# Patient Record
Sex: FEMALE | Race: WHITE | NOT HISPANIC OR LATINO | Employment: FULL TIME | URBAN - NONMETROPOLITAN AREA
[De-identification: names, ages, dates, MRNs, and addresses within clinical notes are randomized per-mention and may not be internally consistent; named-entity substitution may affect disease eponyms.]

---

## 2017-01-09 ENCOUNTER — OFFICE VISIT - GICH (OUTPATIENT)
Dept: FAMILY MEDICINE | Facility: OTHER | Age: 47
End: 2017-01-09

## 2017-01-09 ENCOUNTER — HISTORY (OUTPATIENT)
Dept: FAMILY MEDICINE | Facility: OTHER | Age: 47
End: 2017-01-09

## 2017-01-09 DIAGNOSIS — R05.9 COUGH: ICD-10-CM

## 2017-01-09 DIAGNOSIS — H65.192 OTHER ACUTE NONSUPPURATIVE OTITIS MEDIA, LEFT EAR: ICD-10-CM

## 2017-01-09 DIAGNOSIS — R06.02 SHORTNESS OF BREATH: ICD-10-CM

## 2017-01-09 DIAGNOSIS — J06.9 ACUTE UPPER RESPIRATORY INFECTION: ICD-10-CM

## 2017-01-09 DIAGNOSIS — J20.9 ACUTE BRONCHITIS: ICD-10-CM

## 2017-03-20 ENCOUNTER — OFFICE VISIT - GICH (OUTPATIENT)
Dept: FAMILY MEDICINE | Facility: OTHER | Age: 47
End: 2017-03-20

## 2017-03-20 ENCOUNTER — HISTORY (OUTPATIENT)
Dept: FAMILY MEDICINE | Facility: OTHER | Age: 47
End: 2017-03-20

## 2017-03-20 ENCOUNTER — HISTORY (OUTPATIENT)
Dept: EMERGENCY MEDICINE | Facility: OTHER | Age: 47
End: 2017-03-20

## 2017-03-21 ENCOUNTER — AMBULATORY - GICH (OUTPATIENT)
Dept: FAMILY MEDICINE | Facility: OTHER | Age: 47
End: 2017-03-21

## 2017-05-26 ENCOUNTER — HISTORY (OUTPATIENT)
Dept: FAMILY MEDICINE | Facility: OTHER | Age: 47
End: 2017-05-26

## 2017-05-26 ENCOUNTER — OFFICE VISIT - GICH (OUTPATIENT)
Dept: FAMILY MEDICINE | Facility: OTHER | Age: 47
End: 2017-05-26

## 2017-05-26 DIAGNOSIS — M54.50 LOW BACK PAIN: ICD-10-CM

## 2017-06-22 ENCOUNTER — AMBULATORY - GICH (OUTPATIENT)
Dept: FAMILY MEDICINE | Facility: OTHER | Age: 47
End: 2017-06-22

## 2017-06-22 ENCOUNTER — HISTORY (OUTPATIENT)
Dept: FAMILY MEDICINE | Facility: OTHER | Age: 47
End: 2017-06-22

## 2017-06-22 ENCOUNTER — OFFICE VISIT - GICH (OUTPATIENT)
Dept: FAMILY MEDICINE | Facility: OTHER | Age: 47
End: 2017-06-22

## 2017-06-22 DIAGNOSIS — R53.83 OTHER FATIGUE: ICD-10-CM

## 2017-06-22 DIAGNOSIS — M79.10 MYALGIA: ICD-10-CM

## 2017-06-22 DIAGNOSIS — E55.9 VITAMIN D DEFICIENCY: ICD-10-CM

## 2017-06-22 LAB
ABSOLUTE BASOPHILS - HISTORICAL: 0.1 THOU/CU MM
ABSOLUTE EOSINOPHILS - HISTORICAL: 0.1 THOU/CU MM
ABSOLUTE IMMATURE GRANULOCYTES(METAS,MYELOS,PROS) - HISTORICAL: 0 THOU/CU MM
ABSOLUTE LYMPHOCYTES - HISTORICAL: 2.3 THOU/CU MM (ref 0.9–2.9)
ABSOLUTE MONOCYTES - HISTORICAL: 0.5 THOU/CU MM
ABSOLUTE NEUTROPHILS - HISTORICAL: 4.3 THOU/CU MM (ref 1.7–7)
BASOPHILS # BLD AUTO: 0.7 %
EOSINOPHIL NFR BLD AUTO: 1.7 %
ERYTHROCYTE [DISTWIDTH] IN BLOOD BY AUTOMATED COUNT: 13.7 % (ref 11.5–15.5)
HCT VFR BLD AUTO: 43.4 % (ref 33–51)
HEMOGLOBIN: 13.8 G/DL (ref 12–16)
IMMATURE GRANULOCYTES(METAS,MYELOS,PROS) - HISTORICAL: 0.3 %
LYMPHOCYTES NFR BLD AUTO: 32 % (ref 20–44)
MCH RBC QN AUTO: 26.7 PG (ref 26–34)
MCHC RBC AUTO-ENTMCNC: 31.8 G/DL (ref 32–36)
MCV RBC AUTO: 84 FL (ref 80–100)
MONOCYTES NFR BLD AUTO: 6.4 %
NEUTROPHILS NFR BLD AUTO: 58.9 % (ref 42–72)
PLATELET # BLD AUTO: 227 THOU/CU MM (ref 140–440)
PMV BLD: 8.4 FL (ref 6.5–11)
RED BLOOD COUNT - HISTORICAL: 5.16 MIL/CU MM (ref 4–5.2)
VITAMIN D TOTAL - HISTORICAL: 25.4 NG/ML
WHITE BLOOD COUNT - HISTORICAL: 7.2 THOU/CU MM (ref 4.5–11)

## 2017-06-23 LAB — LYME SCREEN W/REFLEX WEST BLOT - HISTORICAL: NEGATIVE

## 2017-06-25 LAB
ANAPLASMA PHAGOCYTOPHILUM - HISTORICAL: NEGATIVE
EHRLICHIA CHAFFEENSIS - HISTORICAL: NEGATIVE
EHRLICHIA EWINGII/CANIS - HISTORICAL: NEGATIVE
EHRLICHIA MURIS-LIKE - HISTORICAL: NEGATIVE

## 2017-07-12 ENCOUNTER — HISTORY (OUTPATIENT)
Dept: FAMILY MEDICINE | Facility: OTHER | Age: 47
End: 2017-07-12

## 2017-07-12 ENCOUNTER — OFFICE VISIT - GICH (OUTPATIENT)
Dept: FAMILY MEDICINE | Facility: OTHER | Age: 47
End: 2017-07-12

## 2017-07-12 DIAGNOSIS — L72.3 SEBACEOUS CYST: ICD-10-CM

## 2017-07-12 DIAGNOSIS — L08.9 LOCAL INFECTION OF SKIN AND SUBCUTANEOUS TISSUE: ICD-10-CM

## 2017-07-14 LAB
CULTURE - HISTORICAL: ABNORMAL
CULTURE - HISTORICAL: ABNORMAL
GRAM STAIN: ABNORMAL
SPECIMEN DESCRIPTION - HISTORICAL: ABNORMAL
SUSCEPTIBILITY RESULT - HISTORICAL: ABNORMAL

## 2017-08-04 ENCOUNTER — AMBULATORY - GICH (OUTPATIENT)
Dept: SURGERY | Facility: OTHER | Age: 47
End: 2017-08-04

## 2017-08-04 ENCOUNTER — HISTORY (OUTPATIENT)
Dept: SURGERY | Facility: OTHER | Age: 47
End: 2017-08-04

## 2017-08-04 DIAGNOSIS — L08.9 LOCAL INFECTION OF SKIN AND SUBCUTANEOUS TISSUE: ICD-10-CM

## 2017-08-04 DIAGNOSIS — L72.3 SEBACEOUS CYST: ICD-10-CM

## 2017-08-10 ENCOUNTER — HISTORY (OUTPATIENT)
Dept: FAMILY MEDICINE | Facility: OTHER | Age: 47
End: 2017-08-10

## 2017-08-10 ENCOUNTER — OFFICE VISIT - GICH (OUTPATIENT)
Dept: FAMILY MEDICINE | Facility: OTHER | Age: 47
End: 2017-08-10

## 2017-08-10 DIAGNOSIS — J01.10 ACUTE FRONTAL SINUSITIS: ICD-10-CM

## 2017-08-15 ENCOUNTER — OFFICE VISIT - GICH (OUTPATIENT)
Dept: SURGERY | Facility: OTHER | Age: 47
End: 2017-08-15

## 2017-08-15 ENCOUNTER — HISTORY (OUTPATIENT)
Dept: SURGERY | Facility: OTHER | Age: 47
End: 2017-08-15

## 2017-08-15 DIAGNOSIS — Z48.817 ENCOUNTER FOR SURGICAL AFTERCARE FOLLOWING SURGERY OF SKIN OR SUBCUTANEOUS TISSUE: ICD-10-CM

## 2017-09-20 ENCOUNTER — HISTORY (OUTPATIENT)
Dept: FAMILY MEDICINE | Facility: OTHER | Age: 47
End: 2017-09-20

## 2017-09-20 ENCOUNTER — OFFICE VISIT - GICH (OUTPATIENT)
Dept: FAMILY MEDICINE | Facility: OTHER | Age: 47
End: 2017-09-20

## 2017-09-20 DIAGNOSIS — E55.9 VITAMIN D DEFICIENCY: ICD-10-CM

## 2017-09-20 DIAGNOSIS — R13.10 DYSPHAGIA: ICD-10-CM

## 2017-09-20 DIAGNOSIS — Z90.710 ACQUIRED ABSENCE OF BOTH CERVIX AND UTERUS: ICD-10-CM

## 2017-09-20 DIAGNOSIS — E04.2 NONTOXIC MULTINODULAR GOITER: ICD-10-CM

## 2017-09-20 DIAGNOSIS — Z72.0 TOBACCO USE: ICD-10-CM

## 2017-09-20 DIAGNOSIS — Z23 ENCOUNTER FOR IMMUNIZATION: ICD-10-CM

## 2017-09-20 DIAGNOSIS — Z12.39 ENCOUNTER FOR OTHER SCREENING FOR MALIGNANT NEOPLASM OF BREAST: ICD-10-CM

## 2017-09-20 DIAGNOSIS — G47.33 OBSTRUCTIVE SLEEP APNEA: ICD-10-CM

## 2017-09-20 DIAGNOSIS — Z99.89 DEPENDENCE ON OTHER ENABLING MACHINES AND DEVICES: ICD-10-CM

## 2017-09-20 DIAGNOSIS — Z00.00 ENCOUNTER FOR GENERAL ADULT MEDICAL EXAMINATION WITHOUT ABNORMAL FINDINGS: ICD-10-CM

## 2017-09-20 LAB
CHOL/HDL RATIO - HISTORICAL: 6.79
CHOLESTEROL TOTAL: 231 MG/DL
HDLC SERPL-MCNC: 34 MG/DL (ref 23–92)
LDLC SERPL CALC-MCNC: 152 MG/DL
NON-HDL CHOLESTEROL - HISTORICAL: 197 MG/DL
PROVIDER ORDERDED STATUS - HISTORICAL: ABNORMAL
TRIGL SERPL-MCNC: 224 MG/DL
TSH - HISTORICAL: 2.82 UIU/ML (ref 0.34–5.6)

## 2017-09-28 ENCOUNTER — AMBULATORY - GICH (OUTPATIENT)
Dept: FAMILY MEDICINE | Facility: OTHER | Age: 47
End: 2017-09-28

## 2017-09-28 ENCOUNTER — HISTORY (OUTPATIENT)
Dept: RADIOLOGY | Facility: OTHER | Age: 47
End: 2017-09-28

## 2017-09-28 ENCOUNTER — HOSPITAL ENCOUNTER (OUTPATIENT)
Dept: RADIOLOGY | Facility: OTHER | Age: 47
End: 2017-09-28
Attending: NURSE PRACTITIONER

## 2017-09-28 DIAGNOSIS — R13.10 DYSPHAGIA: ICD-10-CM

## 2017-09-28 DIAGNOSIS — Z12.39 ENCOUNTER FOR OTHER SCREENING FOR MALIGNANT NEOPLASM OF BREAST: ICD-10-CM

## 2017-09-28 DIAGNOSIS — E04.2 NONTOXIC MULTINODULAR GOITER: ICD-10-CM

## 2017-09-28 DIAGNOSIS — Z72.0 TOBACCO USE: ICD-10-CM

## 2017-10-05 ENCOUNTER — COMMUNICATION - GICH (OUTPATIENT)
Dept: FAMILY MEDICINE | Facility: OTHER | Age: 47
End: 2017-10-05

## 2017-10-16 ENCOUNTER — OFFICE VISIT - GICH (OUTPATIENT)
Dept: FAMILY MEDICINE | Facility: OTHER | Age: 47
End: 2017-10-16

## 2017-10-16 ENCOUNTER — HISTORY (OUTPATIENT)
Dept: FAMILY MEDICINE | Facility: OTHER | Age: 47
End: 2017-10-16

## 2017-10-16 DIAGNOSIS — K22.89 OTHER SPECIFIED DISEASES OF ESOPHAGUS: ICD-10-CM

## 2017-10-16 DIAGNOSIS — K21.9 GASTRO-ESOPHAGEAL REFLUX DISEASE WITHOUT ESOPHAGITIS: ICD-10-CM

## 2017-10-16 DIAGNOSIS — E78.2 MIXED HYPERLIPIDEMIA: ICD-10-CM

## 2017-10-16 DIAGNOSIS — R19.4 CHANGE IN BOWEL HABITS: ICD-10-CM

## 2017-10-16 DIAGNOSIS — F17.200 NICOTINE DEPENDENCE, UNCOMPLICATED: ICD-10-CM

## 2017-10-16 DIAGNOSIS — K44.9 DIAPHRAGMATIC HERNIA WITHOUT OBSTRUCTION OR GANGRENE: ICD-10-CM

## 2017-10-16 LAB
A/G RATIO - HISTORICAL: 1.4 (ref 1–2)
ALBUMIN SERPL-MCNC: 4 G/DL (ref 3.5–5.7)
ALP SERPL-CCNC: 81 IU/L (ref 34–104)
ALT (SGPT) - HISTORICAL: 11 IU/L (ref 7–52)
ANION GAP - HISTORICAL: 6 (ref 5–18)
AST SERPL-CCNC: 16 IU/L (ref 13–39)
BILIRUB SERPL-MCNC: 0.4 MG/DL (ref 0.3–1)
BUN SERPL-MCNC: 14 MG/DL (ref 7–25)
BUN/CREAT RATIO - HISTORICAL: 17
CALCIUM SERPL-MCNC: 9.4 MG/DL (ref 8.6–10.3)
CHLORIDE SERPLBLD-SCNC: 104 MMOL/L (ref 98–107)
CO2 SERPL-SCNC: 26 MMOL/L (ref 21–31)
CREAT SERPL-MCNC: 0.84 MG/DL (ref 0.7–1.3)
GFR IF NOT AFRICAN AMERICAN - HISTORICAL: >60 ML/MIN/1.73M2
GLOBULIN - HISTORICAL: 2.9 G/DL (ref 2–3.7)
GLUCOSE SERPL-MCNC: 114 MG/DL (ref 70–105)
POTASSIUM SERPL-SCNC: 4.5 MMOL/L (ref 3.5–5.1)
PROT SERPL-MCNC: 6.9 G/DL (ref 6.4–8.9)
SODIUM SERPL-SCNC: 136 MMOL/L (ref 133–143)

## 2017-10-16 ASSESSMENT — PATIENT HEALTH QUESTIONNAIRE - PHQ9: SUM OF ALL RESPONSES TO PHQ QUESTIONS 1-9: 0

## 2017-10-17 ENCOUNTER — AMBULATORY - GICH (OUTPATIENT)
Dept: FAMILY MEDICINE | Facility: OTHER | Age: 47
End: 2017-10-17

## 2017-10-17 DIAGNOSIS — K22.89 OTHER SPECIFIED DISEASES OF ESOPHAGUS: ICD-10-CM

## 2017-10-17 DIAGNOSIS — F17.200 NICOTINE DEPENDENCE, UNCOMPLICATED: ICD-10-CM

## 2017-10-18 ENCOUNTER — HOSPITAL ENCOUNTER (OUTPATIENT)
Dept: RADIOLOGY | Facility: OTHER | Age: 47
End: 2017-10-18
Attending: NURSE PRACTITIONER

## 2017-10-18 DIAGNOSIS — F17.200 NICOTINE DEPENDENCE, UNCOMPLICATED: ICD-10-CM

## 2017-10-18 DIAGNOSIS — K22.89 OTHER SPECIFIED DISEASES OF ESOPHAGUS: ICD-10-CM

## 2017-10-24 ENCOUNTER — HOSPITAL ENCOUNTER (EMERGENCY)
Facility: HOSPITAL | Age: 47
Discharge: HOME | End: 2017-10-24
Attending: PHYSICIAN ASSISTANT | Admitting: PHYSICIAN ASSISTANT
Payer: OTHER MISCELLANEOUS

## 2017-10-24 ENCOUNTER — OFFICE VISIT - GICH (OUTPATIENT)
Dept: SURGERY | Facility: OTHER | Age: 47
End: 2017-10-24

## 2017-10-24 ENCOUNTER — APPOINTMENT (OUTPATIENT)
Dept: GENERAL RADIOLOGY | Facility: HOSPITAL | Age: 47
End: 2017-10-24
Attending: PHYSICIAN ASSISTANT
Payer: OTHER MISCELLANEOUS

## 2017-10-24 ENCOUNTER — HISTORY (OUTPATIENT)
Dept: SURGERY | Facility: OTHER | Age: 47
End: 2017-10-24

## 2017-10-24 ENCOUNTER — AMBULATORY - GICH (OUTPATIENT)
Dept: SCHEDULING | Facility: OTHER | Age: 47
End: 2017-10-24

## 2017-10-24 VITALS
SYSTOLIC BLOOD PRESSURE: 152 MMHG | OXYGEN SATURATION: 99 % | DIASTOLIC BLOOD PRESSURE: 86 MMHG | RESPIRATION RATE: 16 BRPM

## 2017-10-24 DIAGNOSIS — S56.911A ELBOW STRAIN, RIGHT, INITIAL ENCOUNTER: ICD-10-CM

## 2017-10-24 DIAGNOSIS — S13.9XXA NECK SPRAIN, INITIAL ENCOUNTER: ICD-10-CM

## 2017-10-24 DIAGNOSIS — S50.811A FOREARM ABRASION, RIGHT, INITIAL ENCOUNTER: ICD-10-CM
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
## 2017-10-24 DIAGNOSIS — R10.30 LOWER ABDOMINAL PAIN: ICD-10-CM

## 2017-10-24 DIAGNOSIS — R19.4 CHANGE IN BOWEL HABITS: ICD-10-CM

## 2017-10-24 DIAGNOSIS — R19.8 OTHER SPECIFIED SYMPTOMS AND SIGNS INVOLVING THE DIGESTIVE SYSTEM AND ABDOMEN: ICD-10-CM

## 2017-10-24 DIAGNOSIS — R03.0 ELEVATED BLOOD PRESSURE READING WITHOUT DIAGNOSIS OF HYPERTENSION: ICD-10-CM

## 2017-10-24 DIAGNOSIS — S29.011A MUSCLE STRAIN OF CHEST WALL, INITIAL ENCOUNTER: ICD-10-CM

## 2017-10-24 PROCEDURE — 99283 EMERGENCY DEPT VISIT LOW MDM: CPT | Performed by: PHYSICIAN ASSISTANT

## 2017-10-24 PROCEDURE — 72050 X-RAY EXAM NECK SPINE 4/5VWS: CPT | Mod: TC

## 2017-10-24 PROCEDURE — 71020 XR CHEST 2 VW: CPT | Mod: TC

## 2017-10-24 PROCEDURE — 99284 EMERGENCY DEPT VISIT MOD MDM: CPT | Mod: 25

## 2017-10-24 PROCEDURE — 73070 X-RAY EXAM OF ELBOW: CPT | Mod: TC,RT

## 2017-10-24 RX ORDER — CYCLOBENZAPRINE HCL 10 MG
TABLET ORAL
Qty: 20 TABLET | Refills: 0 | Status: SHIPPED | OUTPATIENT
Start: 2017-10-24 | End: 2018-02-16

## 2017-10-24 RX ORDER — KETOROLAC TROMETHAMINE 10 MG/1
10 TABLET, FILM COATED ORAL EVERY 6 HOURS PRN
Qty: 20 TABLET | Refills: 0 | Status: SHIPPED | OUTPATIENT
Start: 2017-10-24 | End: 2018-02-16

## 2017-10-24 NOTE — ED AVS SNAPSHOT
[facility]    [address] [address]    Phone:  [phone]                                       Rose Mendez   MRN: [id number]    Department:  [facility]   Date of Visit:  10/24/2017           After Visit Summary Signature Page     I have received my discharge instructions, and my questions have been answered. I have discussed any challenges I see with this plan with the nurse or doctor.    ..........................................................................................................................................  Patient/Patient Representative Signature      ..........................................................................................................................................  Patient Representative Print Name and Relationship to Patient    ..................................................               ................................................  Date                                            Time    ..........................................................................................................................................  Reviewed by Signature/Title    ...................................................              ..............................................  Date                                                            Time
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
## 2017-10-25 ENCOUNTER — COMMUNICATION - GICH (OUTPATIENT)
Dept: SURGERY | Facility: OTHER | Age: 47
End: 2017-10-25

## 2017-10-25 ENCOUNTER — HOSPITAL ENCOUNTER (OUTPATIENT)
Dept: LAB | Facility: OTHER | Age: 47
End: 2017-10-25
Attending: SURGERY | Admitting: SURGERY

## 2017-10-25 DIAGNOSIS — K62.5 HEMORRHAGE OF ANUS AND RECTUM: ICD-10-CM

## 2017-10-25 DIAGNOSIS — R19.4 CHANGE IN BOWEL HABITS: ICD-10-CM

## 2017-10-25 LAB — HEMOCCULT STL QL: NEGATIVE

## 2017-10-26 ENCOUNTER — AMBULATORY - GICH (OUTPATIENT)
Dept: SURGERY | Facility: OTHER | Age: 47
End: 2017-10-26

## 2017-10-26 DIAGNOSIS — A04.72 ENTEROCOLITIS DUE TO CLOSTRIDIUM DIFFICILE: ICD-10-CM

## 2017-10-26 LAB
CAMPYLOBACTER EIA - HISTORICAL: NEGATIVE
SHIGA TOXIN 1 - HISTORICAL: NEGATIVE
SHIGA TOXIN 2 - HISTORICAL: NEGATIVE

## 2017-10-26 ASSESSMENT — ENCOUNTER SYMPTOMS
PSYCHIATRIC NEGATIVE: 1
WOUND: 1
MYALGIAS: 1
DIZZINESS: 0
CHEST TIGHTNESS: 0
SHORTNESS OF BREATH: 0
ABDOMINAL PAIN: 0
NECK PAIN: 0
HEADACHES: 0
ABDOMINAL DISTENTION: 0
NAUSEA: 0
CONSTITUTIONAL NEGATIVE: 1
VOMITING: 0
PHOTOPHOBIA: 0
WEAKNESS: 0
NECK STIFFNESS: 0
ARTHRALGIAS: 1

## 2017-10-27 LAB
CULTURE - HISTORICAL: NORMAL
METHOD O&P - HISTORICAL: NORMAL
OVA/PARASITE EXAM - HISTORICAL: NORMAL

## 2017-10-31 ENCOUNTER — AMBULATORY - GICH (OUTPATIENT)
Dept: SURGERY | Facility: OTHER | Age: 47
End: 2017-10-31

## 2017-11-17 ENCOUNTER — SURGERY (OUTPATIENT)
Dept: SURGERY | Facility: OTHER | Age: 47
End: 2017-11-17

## 2017-12-16 ENCOUNTER — HEALTH MAINTENANCE LETTER (OUTPATIENT)
Age: 47
End: 2017-12-16

## 2017-12-27 NOTE — PROGRESS NOTES
Patient Information     Patient Name MRN Sex Rose Doss [id number] Female 1970      Progress Notes by Munira Abbott at 10/18/2017  2:04 PM     Author:  Munira Abbott Service:  (none) Author Type:  Other Clinical Staff     Filed:  10/18/2017  2:04 PM Date of Service:  10/18/2017  2:04 PM Status:  Signed     :  Munira Abbott (Other Clinical Staff)            Falls Risk Criteria:    Age 65 and older or under age 4        Sensory deficits    Poor vision    Use of ambulatory aides    Impaired judgment    Unable to walk independently    Meets High Risk criteria for falls:  No

## 2017-12-27 NOTE — PROGRESS NOTES
Patient Information     Patient Name MRN Sex Rose Doss [id number] Female 1970      Progress Notes by Telma Garcia NP at 2017  9:30 AM     Author:  Telma Garcia NP Service:  (none) Author Type:  PHYS- Nurse Practitioner     Filed:  2017  6:37 PM Encounter Date:  2017 Status:  Signed     :  Telma Garcia NP (PHYS- Nurse Practitioner)            SUBJECTIVE:    Rose Mendez is a 47 y.o. female who presents for yearly preventive screening and other issues. As her appointment for her screening mammography, had some changes on her mammogram , had MRI and follow-up with Dr. Vegas a couple of times with mammography and Dr. Vegas recommends routine yearly screening mammography.  Continues to smoke tobacco--not ready to quit at this time  history of multiple thyroid goiters. Has felt a sticking sensation with swallowing sometimes left upper thyroid area for years. Denies any difficulty swallowing food or speaking. Denies any hoarseness. Has MERCEDES with CPAP which she wears most of the time. Has not tried any Tums or Prilosec.  Also reports has had intermittent diarrhea stools sometimes for days that we'll have a couple days with no bowel movement. Denies any changes in diet or water no travel. Denies any mucus or blood. Uncertain of food triggers.  Declines flu vaccine  according to records no second MMR--agreeable to update  would like a breast exam--has noticed cystic changes in both breasts particularly left outer, has noticed this for years. Denies any redness or nipple discharge. Denies any pain.  Reports her maternal aunt was diagnosed with breast cancer at age 70.    HPI    No Known Allergies,   Family History       Problem   Relation Age of Onset     Good Health  Daughter      Good Health  Daughter      Good Health  Daughter      Cancer-prostate  Father      Hypertension  Mother      Diabetes  Maternal Grandmother      Hypertension  Maternal Aunt      Diabetes   Maternal Aunt      Cancer-breast  Maternal Aunt 70     Seizures  Sister      Thyroid Disease  Sister      Heart Disease  Paternal Grandfather 62     MI        Hypertension  Maternal Uncle      Diabetes  Maternal Uncle      Diabetes  Paternal Aunt      Diabetes  Paternal Uncle      Allergies  No Family History      Anesthesia Problem  No Family History      Blood Disease  No Family History      Cancer-colon  No Family History      Cancer-ovarian  No Family History      Stroke  No Family History    ,   Current Outpatient Prescriptions on File Prior to Visit       Medication  Sig Dispense Refill     Cholecalciferol, Vitamin D3, (VITAMIN D-3) 5,000 unit tab Take 1 tablet by mouth once daily. 90 tablet 0     No current facility-administered medications on file prior to visit.    ,   Current Outpatient Prescriptions:      Cholecalciferol, Vitamin D3, (VITAMIN D-3) 5,000 unit tab, Take 1 tablet by mouth once daily., Disp: 90 tablet, Rfl: 0  Medications have been reviewed by me and are current to the best of my knowledge and ability.,   Past Medical History:     Diagnosis  Date     Anesthesia complication 2005    Notes prior complications from anesthesia:  (2005) Nausea      CMC arthritis, thumb, degenerative 2014     DYSESTHESIA     left side, resolved. Normal MRI      Fracture of left ankle      Ganglion cyst of wrist 9/3/2014     History of pregnancy     20 c- sections      Multiple thyroid nodules 2015     MERCEDES on CPAP 7/10/2015    Sleep studies and followup per Dr Alcantara 2015      Ulnar nerve surgery     (left Dr Riggs      Vitamin D deficiency 2015   ,   Patient Active Problem List       Diagnosis  Date Noted     Tobacco dependence  02/10/2016     MERCEDES on CPAP  07/10/2015     Sleep studies and followup per Dr Alcantara 2015        History of hysterectomy  2015     Vitamin D deficiency  2015     Snoring  2015     Multiple thyroid nodules  2015     Ganglion  "cyst of wrist  2014     CMC arthritis, thumb, degenerative  2014     TOBACCO ABUSE       ANXIETY       with mood disorder        ,   Past Surgical History:      Procedure  Laterality Date     ABLATION  5/4/10    endometrial        SECTION        SECTION      with postpartum tubal sterilization Status post left elbow ulnar nerve transposition .         VT REVISE ULNAR NERVE AT WRIST  2005    left, Riggs       right wrist surgery  2015     VAGINAL HYSTERECTOMY  7/6/10     Laparoscopic Assisted Vaginal Hysterectomy      and   Social History       Substance Use Topics         Smoking status:   Current Every Day Smoker     Packs/day:  0.25     Years:  30.00     Types:  Cigarettes     Smokeless tobacco:   Never Used      Comment: occ - most days      Alcohol use   No       REVIEW OF SYSTEMS:  Review of Systems   Constitutional: Negative.    HENT: Negative.    Eyes: Negative.    Respiratory: Negative.    Cardiovascular: Negative.    Gastrointestinal: Positive for constipation and diarrhea.   Genitourinary: Negative.    Musculoskeletal: Negative.    Skin: Negative.    Neurological: Negative.    Endo/Heme/Allergies: Negative.    Psychiatric/Behavioral: Negative.        OBJECTIVE:  /82  Pulse 80  Ht 1.68 m (5' 6.14\")  Wt 86.7 kg (191 lb 2 oz)  BMI 30.72 kg/m2    EXAM:   Physical Exam   Constitutional: She is oriented to person, place, and time and well-developed, well-nourished, and in no distress.   HENT:   Head: Normocephalic and atraumatic.   Mouth/Throat: Oropharynx is clear and moist.   Neck: Normal range of motion. Neck supple. No JVD present.   Unable to palpate pointed area of question where she feels food sticking sometimes with swallowing left upper thyroid area--- no focal erythema or asymmetry     Cardiovascular: Normal rate, regular rhythm and normal heart sounds.    Pulmonary/Chest: Effort normal and breath sounds normal.   Bilateral breast exam without any " palpable firm masses, focal tenderness. No erythema or dimpling. No nipple discharge. No palpable lymphadenopathy   Musculoskeletal: Normal range of motion.   Lymphadenopathy:     She has no cervical adenopathy.   Neurological: She is alert and oriented to person, place, and time. Gait normal.   Skin: Skin is warm and dry.   Psychiatric: Mood, memory, affect and judgment normal.   Nursing note and vitals reviewed.      ASSESSMENT/PLAN:    ICD-10-CM    1. Multiple thyroid nodules E04.2 US THYROID/PARATHYROID      XR ESOPHAGUS      TSH      TSH   2. Breast cancer screening Z12.39 XR MAMMO BILAT SCREENING   3. Need for MMR vaccine Z23 OMNI MMR VIRUS VACCINE SQ      WI ADMIN VACC INITIAL   4. Encounter for preventive health examination Z00.00 LIPID PANEL      LIPID PANEL   5. Vitamin D deficiency E55.9    6. Tobacco use Z72.0 US THYROID/PARATHYROID      XR ESOPHAGUS   7. MERCEDES on CPAP G47.33      Z99.89    8. History of hysterectomy Z90.710    9. Swallowing problem R13.10 US THYROID/PARATHYROID      XR ESOPHAGUS      TSH      TSH    labs pending  screen mammography scheduled and pending    Plan: Strongly encourage tobacco cessation--not ready at this time    We'll recheck vitamin D--- has not used vitamin D supplement--will start 5000 units daily for 3 months and would recheck at that time    We will schedule thyroid ultrasound follow-up--and esophagram  discussed if negative would consider ENT exam with chronic tobacco history    Would like her to try Prilosec or Pepcid daily to see if reflux contributing    Update MMR #2    Will discuss lipids and recommendations upon results--- patient reports she is open to taking medication if needed as she has failed diet and exercise therapies  patient will monitor her diet and triggers for loose stools--- will return to clinic if not resolving    Reviewed past mammography, ultrasound, MRI and Dr. Vegas--- who recommends yearly mammography screening unless concerns arise--patient  very agreeable with this plan

## 2017-12-27 NOTE — PROGRESS NOTES
Patient Information     Patient Name MRN Sex Rose Doss [id number] Female 1970      Progress Notes by Ramona Vegas MD at 2017  8:50 AM     Author:  Ramona Vegas MD Service:  (none) Author Type:  Physician     Filed:  2017  9:21 AM Encounter Date:  2017 Status:  Signed     :  Ramona Vegas MD (Physician)            SUBJECTIVE:  47 y.o. female presents for lesion removal. This lesion has been present for years but recently became swollen and sore. It drained. It is much smaller and not tender currently.    OBJECTIVE:   2.3 x 1.5 cm nodule right side of neck consistent with cyst-pore is noted    ASSESSMENT:  Lesion size: as above   Defect size:2.5 x 1.8 x 2.0 cm    PROCEDURE:  The pathophysiology of skin lesions and skin cysts was discussed with the patient. The risks, benefits and alternatives to excision of the lesion were discussed with the patient, including the risks of infection, scarring, bruising, bleeding and the possible need for further procedures. The patient expressed understanding and wishes to proceed. Informed consent paperwork was completed.    Chloraprep was used to cleanse the skin in the area of the lesion. 1% Lidocaine with epinephrine was infiltrated in the skin and subcutaneous tissue in the area of the lesion. When appropriate anesthesia had been achieved, the lesion was sharply excised with a margin of grossly normal tissue. The cyst wall appeared to be completely excised but there was significant scar tissue around the cyst. The skin edges were approximated using  4-0 Vicryl and 4-0 Ethilon. Sterile dressing was applied. The specimen was labelled and sent to pathology for evaluation. The procedure was well tolerated without complications. Patient was given post procedure instructions and denied further questions. We will call the patient with pathology results.    Ramona Vegas MD

## 2017-12-27 NOTE — PROGRESS NOTES
Patient Information     Patient Name MRN Sex Rose Doss [id number] Female 1970      Progress Notes by Telma Garcia NP at 2017  3:00 PM     Author:  Telma Garcia NP Service:  (none) Author Type:  PHYS- Nurse Practitioner     Filed:  2017  6:33 PM Encounter Date:  2017 Status:  Signed     :  Telma Garcia NP (PHYS- Nurse Practitioner)            SUBJECTIVE:    Rose Mendez is a 47 y.o. female who presents for follow-up on 2 1/2 centimeter infected sebaceous cyst for the past week. Had first noticed about 8 months ago was small pea-sized and became red and raised and tender recently. Denies any drainage, fevers chills or nausea. Has not used anything at home such as hot packs or soaking.      HPI    No Known Allergies,   Family History       Problem   Relation Age of Onset     Good Health  Daughter      Good Health  Daughter      Good Health  Daughter      Cancer-prostate  Father      Hypertension  Mother      Diabetes  Maternal Grandmother      Hypertension  Maternal Aunt      Diabetes  Maternal Aunt      Seizures  Sister      Thyroid Disease  Sister      Heart Disease  Paternal Grandfather 62     MI        Hypertension  Maternal Uncle      Diabetes  Maternal Uncle      Cancer-breast  Maternal Uncle 70     chemo and radition       Diabetes  Paternal Aunt      Diabetes  Paternal Uncle      Allergies  No Family History      Anesthesia Problem  No Family History      Blood Disease  No Family History      Cancer-colon  No Family History      Cancer-ovarian  No Family History      Stroke  No Family History    ,   Current Outpatient Prescriptions on File Prior to Visit       Medication  Sig Dispense Refill     Cholecalciferol, Vitamin D3, (VITAMIN D-3) 5,000 unit tab Take 1 tablet by mouth once daily. 90 tablet 0     ibuprofen (ADVIL; MOTRIN) 800 mg tablet Take 1 tablet by mouth every 8 hours if needed for Pain. 30 tablet 0     ibuprofen (ADVIL; MOTRIN) 200 mg tablet  Take 400 mg by mouth.       No current facility-administered medications on file prior to visit.    ,   Current Outpatient Prescriptions:      Cholecalciferol, Vitamin D3, (VITAMIN D-3) 5,000 unit tab, Take 1 tablet by mouth once daily., Disp: 90 tablet, Rfl: 0     clindamycin (CLEOCIN) 150 mg capsule, Take 2 capsules by mouth 3 times daily for 7 days., Disp: 42 capsule, Rfl: 0     ibuprofen (ADVIL; MOTRIN) 800 mg tablet, Take 1 tablet by mouth every 8 hours if needed for Pain., Disp: 30 tablet, Rfl: 0     ibuprofen (ADVIL; MOTRIN) 200 mg tablet, Take 400 mg by mouth., Disp: , Rfl:   Medications have been reviewed by me and are current to the best of my knowledge and ability.,   Past Medical History:     Diagnosis  Date     Anesthesia complication 2005    Notes prior complications from anesthesia:  (2005) Nausea      CMC arthritis, thumb, degenerative 2014     DYSESTHESIA     left side, resolved. Normal MRI      Fracture of left ankle      Ganglion cyst of wrist 9/3/2014     History of pregnancy     20 c- sections      Multiple thyroid nodules 2015     MERCEDES on CPAP 7/10/2015    Sleep studies and followup per Dr Alcantara 2015      Ulnar nerve surgery     (left Dr Riggs      Vitamin D deficiency 2015   ,   Patient Active Problem List       Diagnosis  Date Noted     Tobacco dependence  02/10/2016     MERCEDES on CPAP  07/10/2015     Sleep studies and followup per Dr Alcantara 2015        History of hysterectomy  2015     Vitamin D deficiency  2015     Snoring  2015     Multiple thyroid nodules  2015     Ganglion cyst of wrist  2014     CMC arthritis, thumb, degenerative  2014     TOBACCO ABUSE       ANXIETY       with mood disorder        ,   Past Surgical History:      Procedure  Laterality Date     ABLATION  5/4/10    endometrial        SECTION        SECTION      with postpartum tubal sterilization Status post left elbow ulnar  "nerve transposition 08/05.         [place] REVISE ULNAR NERVE AT WRIST  2005    left, Riggs       right wrist surgery  2015     VAGINAL HYSTERECTOMY  7/6/10     Laparoscopic Assisted Vaginal Hysterectomy      and   Social History       Substance Use Topics         Smoking status:   Current Every Day Smoker     Packs/day:  0.50     Years:  30.00     Types:  Cigarettes     Last attempt to quit:  1/29/2017     Smokeless tobacco:   Never Used      Comment: occ - most days      Alcohol use   No       REVIEW OF SYSTEMS:  Review of Systems   Constitutional: Negative.    HENT: Negative.    Eyes: Negative.    Respiratory: Negative.    Cardiovascular: Negative.    Gastrointestinal: Negative.    Genitourinary: Negative.    Musculoskeletal: Negative.    Skin: Negative.    Neurological: Negative.    Endo/Heme/Allergies: Negative.    Psychiatric/Behavioral: Negative.        OBJECTIVE:  /82  Pulse 76  Ht 1.68 m (5' 6.14\")  Wt 88.1 kg (194 lb 2 oz)  BMI 31.2 kg/m2    EXAM:   Physical Exam   Constitutional: She is oriented to person, place, and time and well-developed, well-nourished, and in no distress.   HENT:   Head: Normocephalic and atraumatic.   Symmetric smile, full cervical range of motion neck is supple  complete range of motion intact upper extremities   Neck: Normal range of motion. Neck supple. No JVD present.   Cardiovascular: Normal rate.    Pulmonary/Chest: Effort normal.   Musculoskeletal: Normal range of motion. She exhibits no deformity.   Lymphadenopathy:     She has no cervical adenopathy.   Neurological: She is alert and oriented to person, place, and time. No cranial nerve deficit. Gait normal.   Skin: Skin is warm and dry. There is erythema.   Approximately 2-1/2 cm dome-shaped abscess on right shoulder indurated, firm fluctuant center, tender with palpation  with palpation cheesy purulent pus expressed    Discussed with patient abscess has formed from previous sebaceous cyst--will need I&D drainage --- " will provide superficial drainage in clinic today if patient agreeable    Discussed risks--patient wishes to proceed    After signing informed consent  Area cleansed with Hibiclens and local anesthetic injected around periphery with lidocaine 1% with epinephrine about 2 mL--tolerated well   cross stab incision center fluctuant dome--expressed a teaspoon cheesy purulent drainage    Area cleansed with Hibiclens after good hemostasis  dressed with Band-Aid dressing    Culture sent       Psychiatric: Mood, memory, affect and judgment normal.   Nursing note and vitals reviewed.      ASSESSMENT/PLAN:    ICD-10-CM    1. Infected sebaceous cyst L72.3 OK DRAIN SKIN ABSCESS SIMPLE     L08.9 clindamycin (CLEOCIN) 150 mg capsule      AEROBIC CULTURE (MISC BACTERIAL CULT)      AEROBIC CULTURE (MISC BACTERIAL CULT)      AMB CONSULT TO GENERAL SURGEON    culture pending    Discussed with Dr. Vegas--- agrees with I&D, hot packs and clindamycin--- recommends surgical follow-up in 3-4 weeks for definitive treatment sebaceous cyst    Plan:  Keep area covered with Band-Aid for drainage--- treated with clindamycin    Instructed to soak, hot packs frequently to encourage drainage    Monitor for any signs of localized or systemic infection--return to clinic immediately at onset    follow-up with Dr. Vegas in 3-4 weeks--- sooner if worsening

## 2017-12-27 NOTE — PROGRESS NOTES
Patient Information     Patient Name MRN Sex Rose Doss [id number] Female 1970      Progress Notes by Ramona Vegas MD at 10/24/2017  3:50 PM     Author:  Ramona Vegas MD Service:  (none) Author Type:  Physician     Filed:  2017  5:07 PM Encounter Date:  10/24/2017 Status:  Signed     :  Ramona Vegas MD (Physician)            OFFICE CONSULTATION NOTE  Patient Name: Rose Mendez  Address: [address] [place] [id number]  Age:47 y.o.  Sex: female     Primary Care Physician: Telma Garcia NP    I was requested to see this patient in consultation by Telma Garcia NP for evaluation of change in bowel habits. A copy of this note will be sent to Telma Garcia NP.    HPI:   The patient is 47 y.o. female with complaints of abdominal pain. The pain has been present for months. It started back around Labor day. She had more than a week of watery diarrhea. Since that time she has continued to have episodes of diarrhea intermixed with episodes of constipation. She hasn't noted any obvious blood in her bowel movements. She has some reflux but hasn't had nausea or vomiting. She sometimes wakes at night to have bowel movements. It doesn't matter what she eats. No problems with urinating. In July, before this started, she took clindamycin and then in August she took augmentin. Previous testing: Normal liver enzymes, electrolytes .    CONSULTATION ASSESSMENT AND PLAN/RECOMMENDATIONS:   Change in bowel habits-will check stool studies-rule out infection. Will call her with results.  I explained to the patient the risks, benefits and alternatives to diagnostic colonoscopy for evaluating the change in bowel habits. We specifically discussed the risks of bleeding, infection, perforation, potential inability to reach the cecum and the risks of sedation. The patient's questions were answered and the patient wished to proceed.     REVIEW OF SYSTEMS  GENERAL: No fevers or chills. Denies fatigue,  recent weight loss.  HEENT: No sinus drainage. No changes with vision or hearing. No difficulty swallowing.   LYMPHATICS:  No swollen nodes in axilla, neck or groin.  CARDIOVASCULAR: Denies chest pain, palpitations and dyspnea on exertion.  PULMONARY: No shortness of breath or cough. No increase in sputum production.  GI: Denies melena, bright red blood in stools. No hematemesis.  : No dysuria or hematuria.  SKIN: No recent rashes or ulcers.   HEMATOLOGY:  No history of easy bruising or bleeding.  ENDOCRINE:  No history of diabetes. Has had thyroid issues.  NEUROLOGY:  No history of seizures or headaches. No motor or sensory changes.  PAST MEDICAL HISTORY  Past Medical History:     Diagnosis  Date     Anesthesia complication 2005    Notes prior complications from anesthesia:  (2005) Nausea      CMC arthritis, thumb, degenerative 2014     DYSESTHESIA     left side, resolved. Normal MRI      Fracture of left ankle      Ganglion cyst of wrist 9/3/2014     History of pregnancy     20 c- sections      Multiple thyroid nodules 2015     MERCEDES on CPAP 7/10/2015    Sleep studies and followup per Dr Alcantara 2015      Ulnar nerve surgery     (left Dr Riggs      Vitamin D deficiency 2015      PAST SURGICAL HISTORY  Past Surgical History:      Procedure  Laterality Date     ABLATION  5/4/10    endometrial        SECTION        SECTION      with postpartum tubal sterilization Status post left elbow ulnar nerve transposition .         [place] REVISE ULNAR NERVE AT WRIST      left, [place]       right wrist surgery       VAGINAL HYSTERECTOMY  7/6/10     Laparoscopic Assisted Vaginal Hysterectomy        CURRENT MEDS  Current Outpatient Prescriptions on File Prior to Visit       Medication  Sig Dispense Refill     Cholecalciferol, Vitamin D3, (VITAMIN D-3) 5,000 unit tab Take 1 tablet by mouth once daily. 90 tablet 0     omeprazole (PRILOSEC) 20 mg Delayed-Release  capsule Take 1 capsule by mouth once daily before a meal. 60 capsule 0     rosuvastatin (CRESTOR) 10 mg tablet Take 1 tablet by mouth once daily with evening meal. 90 tablet 0     No current facility-administered medications on file prior to visit.      ALLERGIES/SENSITIVITIES  No Known Allergies  FAMILY HISTORY  Family History       Problem   Relation Age of Onset     Good Health  Daughter      Good Health  Daughter      Good Health  Daughter      Cancer-prostate  Father      Hypertension  Mother      Diabetes  Maternal Grandmother      Hypertension  Maternal Aunt      Diabetes  Maternal Aunt      Cancer-breast  Maternal Aunt 70     Coronary artery disease  Maternal Aunt 60     Seizures  Sister      Thyroid Disease  Sister      Heart Disease  Paternal Grandfather 62     MI        Coronary artery disease  Paternal Grandfather 62     Hypertension  Maternal Uncle      Diabetes  Maternal Uncle      Coronary artery disease  Maternal Uncle 40     Diabetes  Paternal Aunt      Diabetes  Paternal Uncle      Allergies  No Family History      Anesthesia Problem  No Family History      Blood Disease  No Family History      Cancer-colon  No Family History      Cancer-ovarian  No Family History      Stroke  No Family History       SOCIAL HISTORY  Social History     Social History        Marital status:  Single     Spouse name: N/A     Number of children:  N/A     Years of education:  N/A     Occupational History      Not on file.     Social History Main Topics         Smoking status:   Current Every Day Smoker     Packs/day:  0.25     Years:  30.00     Types:  Cigarettes     Smokeless tobacco:   Never Used      Comment: occ - most days      Alcohol use   No     Drug use:   No     Sexual activity:   Yes     Partners:  Male     Birth control/ protection:  Surgical     Other Topics   Concern     Caffeine Concern  Yes     Coffee 8 cups daily, soda 16oz (2) daily      Exercise  No     Seat Belt  Yes     %100      Social History  "Narrative      2000.  Raising her 3 children.  Ex-, Osman, sees daughters once a month.      Martina~Child, date of birth 05/12/95    Katrina~Child, date of birth 04/04/97    Shannon~Child, date of birth 08/31/98    Patient currently smokes.                      The above history was reviewed today.    PHYSICAL EXAM  /80  Ht 1.676 m (5' 6\")  Wt 86.4 kg (190 lb 9 oz)  BMI 30.76 kg/m2    GENERAL: Healthy appearing patient in no acute distress. Pleasant and cooperative with exam and interview.   HEENT: Head-normocephalic. Eyes-no scleral icterus, pupils equal, round, and reactive to light. Nose-no nasal drainage. No lesions. Mouth-oral mucosa pink and moist, no lesions.  NECK: Supple. No thyroid nodules. Trachea midline.  LYMPHATICS:  No cervical, axillary or supraclavicular adenopathy.  CV: Regular rate and rhythm, no murmurs. No peripheral edema.  LUNGS:  No respiratory distress. Clear bilaterally to auscultation.  ABDOMEN: Non distended. Bowel sounds active. Soft, mild diffuse tenderness, no hepatosplenomegaly or umbilical hernia. No peritoneal signs.  SKIN: Pink, warm and dry. No jaundice. No rash.  NEURO:  Cranial nerves II-XII grossly intact. Alert and oriented.  PSYCH: Appropriate mood and affect.    I reviewed the patient's recent labs: normal electrolytes and liver chemistry tests.    Raomna Vegas MD           "

## 2017-12-28 NOTE — PROGRESS NOTES
Patient Information     Patient Name MRN Sex Rose Doss [id number] Female 1970      Progress Notes by Telma Garcia NP at 10/16/2017  2:00 PM     Author:  Telma Garcia NP Service:  (none) Author Type:  PHYS- Nurse Practitioner     Filed:  10/21/2017  2:45 PM Encounter Date:  10/16/2017 Status:  Signed     :  Telma Garcia NP (PHYS- Nurse Practitioner)            SUBJECTIVE:    Rose Mendez is a 47 y.o. female who presents for follow-up on recent swallow study results showing mild hiatal hernia with evidence of reflux. Patient continues to report a sensation of something stuck on the left side of her throat. Has a history of multiple goiters, has been followed with ultrasound yearly and stable. Patient continues to smoke tobacco, unable to quit and not interested at this time. Has not tried anything for reflux.  Patient is ready to discuss treatment for chronic lipidemia.    Also like to discuss concerns regarding change in bowel habits, noticed frequent diarrhea stools several times a day then we'll have constipation for couple of days, repetitious pattern. Stools are normal colored, denies any blood or mucus. Started 3 or 4 months ago, this is concerning to her as she has never had this pattern before. Denies any change in water, travel, food. Has not noticed any dietary triggers. Did have one course of antibiotics for an infected sebaceous cyst in July. Denies any family history of colorectal cancers.    HPI    No Known Allergies,   Family History       Problem   Relation Age of Onset     Good Health  Daughter      Good Health  Daughter      Good Health  Daughter      Cancer-prostate  Father      Hypertension  Mother      Diabetes  Maternal Grandmother      Hypertension  Maternal Aunt      Diabetes  Maternal Aunt      Cancer-breast  Maternal Aunt 70     Coronary artery disease  Maternal Aunt 60     Seizures  Sister      Thyroid Disease  Sister      Heart Disease  Paternal  Grandfather 62     MI        Coronary artery disease  Paternal Grandfather 62     Hypertension  Maternal Uncle      Diabetes  Maternal Uncle      Coronary artery disease  Maternal Uncle 40     Diabetes  Paternal Aunt      Diabetes  Paternal Uncle      Allergies  No Family History      Anesthesia Problem  No Family History      Blood Disease  No Family History      Cancer-colon  No Family History      Cancer-ovarian  No Family History      Stroke  No Family History    ,   Current Outpatient Prescriptions on File Prior to Visit       Medication  Sig Dispense Refill     Cholecalciferol, Vitamin D3, (VITAMIN D-3) 5,000 unit tab Take 1 tablet by mouth once daily. 90 tablet 0     No current facility-administered medications on file prior to visit.    ,   Current Outpatient Prescriptions:      Cholecalciferol, Vitamin D3, (VITAMIN D-3) 5,000 unit tab, Take 1 tablet by mouth once daily., Disp: 90 tablet, Rfl: 0     omeprazole (PRILOSEC) 20 mg Delayed-Release capsule, Take 1 capsule by mouth once daily before a meal., Disp: 60 capsule, Rfl: 0     rosuvastatin (CRESTOR) 10 mg tablet, Take 1 tablet by mouth once daily with evening meal., Disp: 90 tablet, Rfl: 0  Medications have been reviewed by me and are current to the best of my knowledge and ability.,   Past Medical History:     Diagnosis  Date     Anesthesia complication 2005    Notes prior complications from anesthesia:  (2005) Nausea      CMC arthritis, thumb, degenerative 2014     DYSESTHESIA     left side, resolved. Normal MRI      Fracture of left ankle      Ganglion cyst of wrist 9/3/2014     History of pregnancy     20 c- sections      Multiple thyroid nodules 2015     MERCEDES on CPAP 7/10/2015    Sleep studies and followup per Dr Alcantara 2015      Ulnar nerve surgery     (left Dr Riggs      Vitamin D deficiency 2015   ,   Patient Active Problem List       Diagnosis  Date Noted     Tobacco dependence  02/10/2016     MERCEDES on CPAP   07/10/2015     Sleep studies and followup per Dr Alcantara 2015        History of hysterectomy  2015     Vitamin D deficiency  2015     Snoring  2015     Multiple thyroid nodules  2015     Ganglion cyst of wrist  2014     CMC arthritis, thumb, degenerative  2014     TOBACCO ABUSE       ANXIETY       with mood disorder        ,   Past Surgical History:      Procedure  Laterality Date     ABLATION  5/4/10    endometrial        SECTION        SECTION      with postpartum tubal sterilization Status post left elbow ulnar nerve transposition .         [place] REVISE ULNAR NERVE AT WRIST  2005    left, Riggs       right wrist surgery       VAGINAL HYSTERECTOMY  7/6/10     Laparoscopic Assisted Vaginal Hysterectomy      and   Social History       Substance Use Topics         Smoking status:   Current Every Day Smoker     Packs/day:  0.25     Years:  30.00     Types:  Cigarettes     Smokeless tobacco:   Never Used      Comment: occ - most days      Alcohol use   No       REVIEW OF SYSTEMS:  Review of Systems   Constitutional: Negative.    HENT: Positive for sore throat.    Eyes: Negative.    Respiratory: Negative.    Cardiovascular: Negative.    Gastrointestinal: Positive for constipation, diarrhea and heartburn. Negative for abdominal pain, blood in stool, melena, nausea and vomiting.   Genitourinary: Negative.    Musculoskeletal: Negative.    Skin: Negative.    Neurological: Negative.    Endo/Heme/Allergies: Negative.    Psychiatric/Behavioral: Negative.        OBJECTIVE:  /74  Pulse 64  Wt 87.1 kg (192 lb)  BMI 30.86 kg/m2    EXAM:   Physical Exam   Constitutional: She is oriented to person, place, and time and well-developed, well-nourished, and in no distress.   HENT:   Head: Normocephalic and atraumatic.   Mouth/Throat: Oropharynx is clear and moist.   Neck: Normal range of motion. Neck supple.   I am unable to feel any abnormal lymph nodes or  mass in area of question   Cardiovascular: Normal rate.    Pulmonary/Chest: Effort normal.   Abdominal: Soft. Bowel sounds are normal. She exhibits no distension and no mass. There is no tenderness. There is no rebound and no guarding.   Musculoskeletal: Normal range of motion.   Lymphadenopathy:     She has no cervical adenopathy.   Neurological: She is alert and oriented to person, place, and time. Gait normal.   Skin: Skin is warm and dry.   Psychiatric: Mood, memory, affect and judgment normal.   Nursing note and vitals reviewed.      ASSESSMENT/PLAN:    ICD-10-CM    1. Change in bowel habits R19.4 AMB CONSULT TO GENERAL SURGEON   2. Sensation of foreign body in esophagus K22.8 COMPLETE METABOLIC PANEL      COMPLETE METABOLIC PANEL      CT NECK SOFT TISSUE W      CANCELED: CT NECK CHEST W   3. Tobacco dependence F17.200 COMPLETE METABOLIC PANEL      omeprazole (PRILOSEC) 20 mg Delayed-Release capsule      COMPLETE METABOLIC PANEL      CT NECK SOFT TISSUE W      CANCELED: CT NECK CHEST W   4. Hiatal hernia with GERD K21.9 omeprazole (PRILOSEC) 20 mg Delayed-Release capsule     K44.9    5. Mixed hyperlipidemia E78.2 rosuvastatin (CRESTOR) 10 mg tablet    recent ultrasound and swallow study negative --- concerns and certainly at risk with chronic tobacco history  Will schedule CT neck and chest        Plan:  Will schedule consultation with Dr. Vegas regarding history of sudden change in bowel habits  for recommendations     Strongly encouraged tobacco cessation--adamantly declines    Declines flu vaccine    Patient is ready to start statin therapy--will start Crestor and fasting lipids in 3 months recheck    Recommend trying omeprazole daily, avoidance of caffeine, tomatoes, alcohol, sodas and other triggering reflux foods      Will follow-up after surgical consultation and recommendations                                Exam: CT NECK SOFT TISSUE W     History: Sensation of foreign body in esophagus     Technique:  Axial images were obtained following contrast administration. Coronal and sagittal reformatted images were then generated.     Findings: There is a marker over the left submandibular region. This corresponds with the left submandibular gland. No focal mass is seen in this location. Salivary glands appear fairly normal and symmetrical. No enlarged cervical lymph nodes are seen. Nonenlarged cervical nodes are seen bilaterally. Hypopharynx has a normal appearance. Larynx has a normal appearance. There is no prevertebral soft tissue swelling.     There are tiny low-attenuation areas in the thyroid, likely tiny cysts or colloid nodules. Bilateral tiny nodules were seen on a recent thyroid ultrasound.         There is degenerative change in the cervical spine with broad-based disc bulges most marked at C5-6 and C6-7 levels.        Impression: No suspicious neck mass or lymph node enlargement.     Electronically Signed By: Gabriela Caldera M.D. on 10/18/2017 2:47 PM      Exam: CT CHEST W     History: Sensation of foreign body in esophagus     Technique: Axial postcontrast enhanced images were obtained with coronal and sagittal reformatted images then generated.     Findings: No enlarged lymph nodes are seen in the mediastinum, olegario or axilla. No lung nodule or mass is seen. There is no focal consolidation. No pleural effusion is seen. Heart and aorta have a normal appearance. Limited images through the upper abdomen show no abnormality.            Impression: No mass or lymph node enlargement.     Electronically Signed By: Gabriela Caldera M.D. on 10/18/2017 2:41 PM

## 2017-12-28 NOTE — PATIENT INSTRUCTIONS
Patient Information     Patient Name Rose Fournier [id number] Female 1970      Patient Instructions by Jaquelin Villalobos NP at 2017 12:00 PM     Author:  Jaquelin Villalobos NP Service:  (none) Author Type:  PHYS- Nurse Practitioner     Filed:  2017 12:31 PM Encounter Date:  2017 Status:  Signed     :  Jaquelin Villalobos NP (PHYS- Nurse Practitioner)            CBC does not show any concerns for bacterial infection  I will call with remaining labs and orders as they are available

## 2017-12-28 NOTE — TELEPHONE ENCOUNTER
Patient Information     Patient Name MRN Rose Graham [id number] Female 1970      Telephone Encounter by Odalis Noble at 10/31/2017  1:27 PM     Author:  Odalis Noble Service:  (none) Author Type:  (none)     Filed:  10/31/2017  1:27 PM Encounter Date:  10/31/2017 Status:  Signed     :  Odalis Noble            Notified to let her know that the colonoscopy has been cancelled.  Odalis Noble LPN..........10/31/2017  1:27 PM

## 2017-12-28 NOTE — PROGRESS NOTES
Patient Information     Patient Name MRN Sex Rose Doss [id number] Female 1970      Progress Notes by Munira Abbott at 10/18/2017  2:04 PM     Author:  Munira Abbott Service:  (none) Author Type:  Other Clinical Staff     Filed:  10/18/2017  2:04 PM Date of Service:  10/18/2017  2:04 PM Status:  Signed     :  Munira Abbott (Other Clinical Staff)            IV Contrast- Discharge Instructions After Your CT Scan      The IV contrast you received today will be filtered from your bloodstream by your kidneys during the next 24 hours and pass from the body in urine.  You will not be aware of this process and your urine will not change in color.  To help this process you should drink at least 4 additional glasses of water or juice today.  This reduces stress on your kidneys.    Most contrast reactions are immediate.  Should you develop symptoms of concern after discharge, contact the department at the number below.  After hours you should contact your personal physician.  If you develop breathing distress or wheezing, call 911.

## 2017-12-28 NOTE — PATIENT INSTRUCTIONS
Patient Information     Patient Name MRN Sex Rose Doss [id number] Female 1970      Patient Instructions by Ramona Vegas MD at 2017  8:50 AM     Author:  Ramona Vegas MD Service:  (none) Author Type:  Physician     Filed:  2017  9:17 AM Encounter Date:  2017 Status:  Signed     :  Ramona Vegas MD (Physician)            Your incision was closed with stitches that will need to be removed. It is ok to remove the dressing and get the incision wet in the shower on the day after your procedure.Don't soak in a tub, pool or lake for 5 days. * If you have concerns, please call.

## 2017-12-28 NOTE — PROGRESS NOTES
Patient Information     Patient Name MRN Sex Rose Doss [id number] Female 1970      Progress Notes by Munira Abbott at 10/18/2017  2:04 PM     Author:  Munira Abbott Service:  (none) Author Type:  Other Clinical Staff     Filed:  10/18/2017  2:04 PM Date of Service:  10/18/2017  2:04 PM Status:  Signed     :  Munira Abbott (Other Clinical Staff)            1.  Has the patient had a previous reaction to IV contrast? No    2.  Does the patient have kidney disease? No    3.  Is the patient on dialysis? No    If YES to any of these questions, exam will be reviewed with a Radiologist before administering contrast.

## 2017-12-28 NOTE — PROGRESS NOTES
Patient Information     Patient Name MRN Sex Rose Doss [id number] Female 1970      Progress Notes by Jaquelin Villalobos NP at 2017 12:00 PM     Author:  Jaquelin Villalobos NP Service:  (none) Author Type:  PHYS- Nurse Practitioner     Filed:  2017  2:31 PM Encounter Date:  2017 Status:  Signed     :  Jaquelin Villalobos NP (PHYS- Nurse Practitioner)            HPI:    Rose Mendez is a 46 y.o. female who presents to clinic today for fatigue. She has nausea, headaches, hot/cold flashes. Sore muscles. Sx present for the past week. Unsure of any fevers. No rashes. No cold sx, no vomiting or diarrhea. She reports a wood tick bite 1 week ago, unsure of any other tick bites. Was [place] her [place] move [place]. Taking ibuprofen and/or tylenol for sx. No recent ill contacts. No thyroid disease. Was on Vitamin D in the past, stopped taking this in February. Vitamin D in January was 37.    Past Medical History:     Diagnosis  Date     Anesthesia complication 2005    Notes prior complications from anesthesia:  (2005) Nausea      CMC arthritis, thumb, degenerative 2014     DYSESTHESIA     left side, resolved. Normal MRI      Fracture of left ankle      Ganglion cyst of wrist 9/3/2014     History of pregnancy     20 c- sections      Multiple thyroid nodules 2015     MERCEDES on CPAP 7/10/2015    Sleep studies and followup per Dr Alcantara 2015      Ulnar nerve surgery     (left Dr Riggs      Vitamin D deficiency 2015     Past Surgical History:      Procedure  Laterality Date     ABLATION  5/4/10    endometrial        SECTION        SECTION      with postpartum tubal sterilization Status post left elbow ulnar nerve transposition .         AR REVISE ULNAR NERVE AT WRIST      left, [place]       right wrist surgery       VAGINAL HYSTERECTOMY  7/6/10     Laparoscopic Assisted Vaginal Hysterectomy       Social History        Substance Use Topics         Smoking status:   Former Smoker     Packs/day:  0.50     Years:  30.00     Types:  Cigarettes     Quit date:  1/29/2017     Smokeless tobacco:   Never Used      Comment: occ - most days      Alcohol use   No     Current Outpatient Prescriptions       Medication  Sig Dispense Refill     ibuprofen (ADVIL; MOTRIN) 800 mg tablet Take 1 tablet by mouth every 8 hours if needed for Pain. 30 tablet 0     ibuprofen (ADVIL; MOTRIN) 200 mg tablet Take 400 mg by mouth.       traMADol (ULTRAM) 50 mg tablet Take 1 tablet by mouth every 6 hours if needed for Pain. 20 tablet 0     No current facility-administered medications for this visit.      Medications have been reviewed by me and are current to the best of my knowledge and ability.    No Known Allergies    ROS:  Pertinent positives and negatives are noted in HPI.    EXAM:  General appearance: well appearing female, in no acute distress  Head: normocephalic, atraumatic  Ears: TM's with cone of light, no erythema, canals clear bilaterally  Eyes: conjunctivae normal  Orophayrnx: moist mucous membranes, tonsils without erythema, exudates or petechiae, no post nasal drip seen  Neck: supple without adenopathy  Respiratory: clear to auscultation bilaterally  Cardiac: RRR with no murmurs  Psychological: normal affect, alert and pleasant  Lab:   Results for orders placed or performed in visit on 06/22/17      VITAMIN D 25 (DEFICIENCY)      Result  Value Ref Range    VITAMIN D TOTAL AFL 25.4   ng/mL   CBC WITH AUTO DIFFERENTIAL      Result  Value Ref Range    WHITE BLOOD COUNT         7.2 4.5 - 11.0 thou/cu mm    RED BLOOD COUNT           5.16 4.00 - 5.20 mil/cu mm    HEMOGLOBIN                13.8 12.0 - 16.0 g/dL    HEMATOCRIT                43.4 33.0 - 51.0 %    MCV                       84 80 - 100 fL    MCH                       26.7 26.0 - 34.0 pg    MCHC                      31.8 (L) 32.0 - 36.0 g/dL    RDW                       13.7 11.5 -  15.5 %    PLATELET COUNT            227 140 - 440 thou/cu mm    MPV                       8.4 6.5 - 11.0 fL    NEUTROPHILS               58.9 42.0 - 72.0 %    LYMPHOCYTES               32.0 20.0 - 44.0 %    MONOCYTES                 6.4 <12.0 %    EOSINOPHILS               1.7 <8.0 %    BASOPHILS                 0.7 <3.0 %    IMMATURE GRANULOCYTES(METAS,MYELOS,PROS) 0.3 %    ABSOLUTE NEUTROPHILS      4.3 1.7 - 7.0 thou/cu mm    ABSOLUTE LYMPHOCYTES      2.3 0.9 - 2.9 thou/cu mm    ABSOLUTE MONOCYTES        0.5 <0.9 thou/cu mm    ABSOLUTE EOSINOPHILS      0.1 <0.5 thou/cu mm    ABSOLUTE BASOPHILS        0.1 <0.3 thou/cu mm    ABSOLUTE IMMATURE GRANULOCYTES(METAS,MYELOS,PROS) 0.0 <=0.3 thou/cu mm         ASSESSMENT/PLAN:    ICD-10-CM    1. Fatigue, unspecified type R53.83 VITAMIN D 25 (DEFICIENCY)      CBC WITH DIFFERENTIAL      LYME SCREEN W/REFLEX      ANAPLASMA      VITAMIN D 25 (DEFICIENCY)      CBC WITH DIFFERENTIAL      LYME SCREEN W/REFLEX      ANAPLASMA      CBC WITH AUTO DIFFERENTIAL   2. Myalgia M79.1 VITAMIN D 25 (DEFICIENCY)      CBC WITH DIFFERENTIAL      LYME SCREEN W/REFLEX      ANAPLASMA      VITAMIN D 25 (DEFICIENCY)      CBC WITH DIFFERENTIAL      LYME SCREEN W/REFLEX      ANAPLASMA      CBC WITH AUTO DIFFERENTIAL   3. Vitamin D insufficiency E55.9 Cholecalciferol, Vitamin D3, (VITAMIN D-3) 5,000 unit tab   CBC normal. Vitamin D low, tx with Vitamin D3 5000 u daily and recommend f/u in 2-3 months. Tick borne illness testing pending and will f/u prn. All questions were answered and she is in agreement with plan.     Patient Instructions   CBC does not show any concerns for bacterial infection  I will call with remaining labs and orders as they are available

## 2017-12-28 NOTE — TELEPHONE ENCOUNTER
Patient Information     Patient Name MRN Rose Graham [id number] Female 1970      Telephone Encounter by Selena Cm at 10/25/2017  8:20 AM     Author:  Selena Cm  Service:  (none) Author Type:  (none)     Filed:  10/25/2017  1:07 PM  Encounter Date:  10/25/2017 Status:  Addendum     :  Val Brown        Related Notes: Original Note by Selena Cm filed at 10/25/2017  8:29 AM            Screening Questions for the Scheduling of Screening Colonoscopies   (If Colonoscopy is diagnostic, Provider should review the chart before scheduling.)  Are you younger than 50 or older than 80?  YES   Do you take aspirin or fish oil?  NO (if yes, tell patient to stop 1 week prior to Colonoscopy)  Do you take warfarin (Coumadin), clopidogrel (Plavix), apixaban (Eliquis), dabigatram (Pradaxa), rivaroxaban (Xarelto) or any blood thinner? NO  Do you use oxygen at home?  NO   Do you have kidney disease? NO  Are you on dialysis? NO  Have you had a stroke or heart attack in the last year? NO  Have you had a stent in your heart or any blood vessel in the last year? NO   Have you had a transplant of any organ? NO   Have you had a colonoscopy or upper endoscopy (EGD) before? NO          When?  NO  Date of scheduled Colonoscopy. 17  [facility]

## 2017-12-28 NOTE — PROGRESS NOTES
Patient Information     Patient Name MRN Sex Rose Doss [id number] Female 1970      Progress Notes by Renetta Alex NP at 8/10/2017 10:45 AM     Author:  Renetta Alex NP Service:  (none) Author Type:  PHYS- Nurse Practitioner     Filed:  8/10/2017 11:00 AM Encounter Date:  8/10/2017 Status:  Signed     :  Renetta Alex NP (PHYS- Nurse Practitioner)            Nursing Notes:   Rupali Rucker  8/10/2017 10:45 AM  Signed  Patient says she has had a cough, nausea, headache, hot and cold, sore throat and fatigue for 2 weeks.  Rupali Still LPN ....................8/10/2017  10:42 AM  SUBJECTIVE:    Rose Mendez is a 47 y.o. female who presents for cough, aches, ill    URI    This is a new problem. Episode onset: 2+ weeks. The problem has been unchanged. There has been no fever. Associated symptoms include congestion, coughing, headaches, a plugged ear sensation, rhinorrhea, sinus pain and a sore throat. Pertinent negatives include no ear pain, nausea, neck pain, sneezing, swollen glands, vomiting or wheezing. Associated symptoms comments: C/O fatigue, sinus pressure, nasal congestion. . She has tried increased fluids, decongestant and NSAIDs for the symptoms. The treatment provided mild relief.       Current Outpatient Prescriptions on File Prior to Visit       Medication  Sig Dispense Refill     Cholecalciferol, Vitamin D3, (VITAMIN D-3) 5,000 unit tab Take 1 tablet by mouth once daily. 90 tablet 0     No current facility-administered medications on file prior to visit.        REVIEW OF SYSTEMS:  Review of Systems   HENT: Positive for congestion, rhinorrhea and sore throat. Negative for ear pain and sneezing.    Respiratory: Positive for cough. Negative for wheezing.    Gastrointestinal: Negative for nausea and vomiting.   Musculoskeletal: Negative for neck pain.   Neurological: Positive for headaches.       OBJECTIVE:  /80  Pulse 73  Temp 97.9  F (36.6  C) (Temporal)   "Ht 1.676 m (5' 6\")  Wt 87.1 kg (192 lb)  SpO2 97%  BMI 30.99 kg/m2    EXAM:   Physical Exam   Constitutional: She is well-developed, well-nourished, and in no distress.   HENT:   Head: Normocephalic and atraumatic.   Right Ear: Tympanic membrane and ear canal normal.   Left Ear: Tympanic membrane and ear canal normal.   Nose: Mucosal edema present. Right sinus exhibits maxillary sinus tenderness. Right sinus exhibits no frontal sinus tenderness. Left sinus exhibits maxillary sinus tenderness. Left sinus exhibits no frontal sinus tenderness.   Mouth/Throat: Uvula is midline, oropharynx is clear and moist and mucous membranes are normal.   Eyes: Conjunctivae are normal.   Neck: Neck supple.   Cardiovascular: Normal rate, regular rhythm and normal heart sounds.    Pulmonary/Chest: Effort normal and breath sounds normal. No respiratory distress. She has no wheezes. She has no rales.   Lymphadenopathy:     She has no cervical adenopathy.   Skin: Skin is warm and dry. No rash noted.   Nursing note and vitals reviewed.      ASSESSMENT/PLAN:    ICD-10-CM    1. Acute non-recurrent frontal sinusitis J01.10 amoxicillin-clavulanate 875-125 mg tablet (AUGMENTIN)        Plan:  Explained that this could still be viral. OTC discussed, Home cares discussed. Smoking discussed. Abx gone over along with risks and benefits including treating virus with abx. I explained my diagnostic considerations and recommendations to the patient, who voiced understanding and agreement with the treatment plan. All questions were answered. We discussed potential side effects of any prescribed or recommended therapies, as well as expectations for response to treatments. She was advised to contact our office if there is no improvement or worsening of conditions or symptoms.  If s/s worsen or persist, patient will either come back or follow up with PCP.         ROHIT NEGRON NP ....................  8/10/2017   10:59 AM              "

## 2017-12-28 NOTE — PROGRESS NOTES
"Patient Information     Patient Name MRN Sex Rose Doss [id number] Female 1970      Progress Notes by Ramona Vegas MD at 8/15/2017  3:30 PM     Author:  Ramona Vegas MD Service:  (none) Author Type:  Physician     Filed:  2017  5:46 PM Encounter Date:  8/15/2017 Status:  Signed     :  Ramona Vegas MD (Physician)            Patient presents for post procedure visit after excision of a cyst from her neck on . Patient has done well. No problems with incision.    /80  Ht 1.676 m (5' 6\")  Wt 87.1 kg (192 lb)  BMI 30.99 kg/m2    General: NAD, pleasant and cooperative with exam and interview.  Skin: healing incision right side of neck. No sign of infection. No pain with palpation. Sutures removed without difficulty.  Psychiatry: awake, alert and oriented. Appropriate affect.  Pathology results: benign cyst  Assessment/Plan:  Discussed procedure and pathology results. Patient can return to normal activities. Continue to monitor for new or changing lesions.  Patient will call with questions or concerns.            "

## 2017-12-28 NOTE — PROGRESS NOTES
Patient Information     Patient Name MRN Sex Rose Doss [id number] Female 1970      Progress Notes by Anum Guerra R.T. (ARRT) at 2017  9:19 AM     Author:  Anum Guerra R.T. (ARRT) Service:  (none) Author Type:  (none)     Filed:  2017  9:20 AM Date of Service:  2017  9:19 AM Status:  Signed     :  Anum Guerra R.T. (ARRT) (Formerly [facility] - Registered Radiologic Technologist)            Falls Risk Criteria:    Age 65 and older or under age 4        Sensory deficits    Poor vision    Use of ambulatory aides    Impaired judgment    Unable to walk independently    Meets High Risk criteria for falls:  no

## 2017-12-29 NOTE — PATIENT INSTRUCTIONS
Patient Information     Patient Name MRN Sex Rose Doss [id number] Female 1970      Patient Instructions by Telma Garcia NP at 2017  3:00 PM     Author:  Telma Garcia NP  Service:  (none) Author Type:  PHYS- Nurse Practitioner     Filed:  2017  6:32 PM  Encounter Date:  2017 Status:  Addendum     :  Telma Garcia NP (PHYS- Nurse Practitioner)        Related Notes: Original Note by Telma Garcia NP (PHYS- Nurse Practitioner) filed at 2017  4:17 PM            Keep area covered with Band-Aid to catch drainage-- change as needed    Use hot packs frequently at least 4 times a day for 20 minutes to encourage drainage, soak in bath or shower    You will get an appointment in the next 1-3 days with surgeon for further evaluation and intervention  this was only a superficial drainage--- surgeon will need to evaluate for deeper drainage and intervention    I will send clindamycin to the pharmacy for you to start today 3 times a day    We'll let you know the results of culture    Ibuprofen as needed for comfort

## 2017-12-29 NOTE — PATIENT INSTRUCTIONS
Patient Information     Patient Name MRN Rose Graham [id number] Female 1970      Patient Instructions by Telma Garcia NP at 2017  9:30 AM     Author:  Telma Garcia NP Service:  (none) Author Type:  PHYS- Nurse Practitioner     Filed:  2017 10:06 AM Encounter Date:  2017 Status:  Signed     :  Telma Garcia NP (PHYS- Nurse Practitioner)            Take your vit D3 5000 units daily x 3 months--would recheck lab

## 2017-12-29 NOTE — PATIENT INSTRUCTIONS
Patient Information     Patient Name MRN Sex Rose Doss [id number] Female 1970      Patient Instructions by Ramona Vegas MD at 10/24/2017  3:50 PM     Author:  Ramona Vegas MD Service:  (none) Author Type:  Physician     Filed:  2017  5:07 PM Encounter Date:  10/24/2017 Status:  Signed     :  Ramona Vegas MD (Physician)            Will call with stool labs when they are back. Call for questions.

## 2017-12-29 NOTE — PATIENT INSTRUCTIONS
Patient Information     Patient Name MRN Rose Graham [id number] Female 1970      Patient Instructions by Telma Garcia NP at 10/16/2017  2:29 PM     Author:  Telma Garcia NP Service:  (none) Author Type:  PHYS- Nurse Practitioner     Filed:  10/16/2017  2:29 PM Encounter Date:  10/16/2017 Status:  Signed     :  Telma Garcia NP (PHYS- Nurse Practitioner)               Index [place] Related topics   Hiatal Hernia   ________________________________________________________________________  KEY POINTS    A hiatal hernia is a condition in which part of the stomach pokes through the diaphragm up into the chest. Usually it does not cause symptoms or problems.    Treatment is usually not needed if you have no symptoms. If you have symptoms, treatment may include quitting smoking, changing your diet, or losing weight. Your healthcare provider may prescribe medicines or you may need surgery.    Ask your healthcare provider how to take care of yourself at home, and what symptoms or problems you should watch for and what to do if you have them.  ________________________________________________________________________  What is a hiatal hernia?   A hiatal hernia is a condition in which part of the stomach pokes through the diaphragm up into the chest. The diaphragm is a muscle between your chest and belly that helps you breathe.  Hiatal hernias are common after middle age. Usually they do not cause symptoms or problems.  What is the cause?  The exact cause of hiatal hernias is not known. They happen more often after age 50 and in people who smoke or are overweight.  What are the symptoms?  Many people with a hiatal hernia never have any symptoms. However, in some cases it causes stomach acid to flow back into the esophagus. The esophagus is the tube that carries food from your throat to your stomach. The backward movement of stomach acid is called reflux. Symptoms of reflux may  include:    Burning pain or warmth in your chest or throat, usually in the breastbone area    Bitter or sour taste in your mouth    Belching and a feeling of bloating or fullness in your stomach    Frequent unexplained dry cough  How is it diagnosed?  Because many hiatal hernias do not cause symptoms, they are often found during exams for other problems. Your healthcare provider will ask about your symptoms and medical history and examine you. Tests may include:    Barium swallow, which is an X-ray taken of the upper part of your digestive tract after you swallow barium. Barium is a liquid that helps your esophagus and stomach show up well on the X-ray.    Endoscopy, which uses a slim, flexible, lighted tube passed through your mouth to look at your esophagus and stomach  How is it treated?  Treatment is usually not needed if you have no symptoms.  If you have heartburn or other symptoms of reflux, your healthcare provider may recommend or prescribe:    Lifestyle changes such as quitting smoking, changing your diet, or losing weight    Medicine to lower the acid in your stomach  If your symptoms are severe and are not controlled by changes in your diet, weight loss, or medicine, your provider may recommend surgery to repair the hernia.  How can I take care of myself?  To feel better and prevent problems:    Follow your healthcare provider s treatment plan. Take your medicines exactly as prescribed.    Take nonprescription antacids after meals and at bedtime, according to your provider s recommendation.    Eat smaller, more frequent meals. Avoid overeating and late-evening snacks or meals.    If certain foods or drinks seem to cause your symptoms or make them worse, avoid those foods.    Try to keep a healthy weight. If you are overweight, lose weight. Extra weight puts pressure on your stomach. The pressure can cause stomach contents to push up into your esophagus.    If you smoke, try to quit. Smoking can increase  stomach acid. Talk to your healthcare provider about ways to quit smoking.    Wear loose fitting clothing without belts.  It may also help if you:    Sit up during meals and wait 2 to 3 hours after eating before you lie down. It s best not to eat for 2 to 3 hours before you go to bed.    Raise the head of your bed 6 to 8 inches by putting the frame on wood blocks. If you cannot raise the frame of the bed, try placing a foam wedge under the head of your mattress. Sleeping on your left side may also help. Just using extra pillows will not help.    Chew sugarless gum after meals. Some studies have shown that this decreases reflux.  Ask your healthcare provider:    How and when you will get your test results    How long it will take to recover from this illness    If there are activities you should avoid and when you can return to your normal activities    How to take care of yourself at home    What symptoms or problems you should watch for and what to do if you have them  Make sure you know when you should come back for a checkup. Keep all appointments for provider visits or tests.  Developed by [employer].  Adult Advisor 2016.3 published by [employer].  Last modified: 2016-03-23  Last reviewed: 2015-10-19  This content is reviewed periodically and is subject to change as new health information becomes available. The information is intended to inform and educate and is not a replacement for medical evaluation, advice, diagnosis or treatment by a healthcare professional.  References   Adult Advisor 2016.3 Index    Copyright   2016 [employer], a division of [employer] All rights reserved.

## 2017-12-30 NOTE — NURSING NOTE
Patient Information     Patient Name MRN Rose Graham [id number] Female 1970      Nursing Note by Susie Nicole at 2017 12:00 PM     Author:  Susie Nicole Service:  (none) Author Type:  (none)     Filed:  2017 12:01 PM Encounter Date:  2017 Status:  Signed     :  Susie Nicole            Patient presents to clinic with fatigue, nausea, muscle aches x 1 week. Patient did state that she found a wood tick on her a week ago and is unsure of how long she had it.  Susie NicoleLPN ....................  2017   11:56 AM

## 2017-12-30 NOTE — NURSING NOTE
Patient Information     Patient Name MRN Rose Graham [id number] Female 1970      Nursing Note by Odalis Noble at 10/24/2017  3:50 PM     Author:  Odalis Noble Service:  (none) Author Type:  (none)     Filed:  10/24/2017  4:03 PM Encounter Date:  10/24/2017 Status:  Signed     :  Odalis Noble            Here today with bowel habit changes.  Some days she is constipated and some days she has diarrhea.  Odalis Noble LPN..........10/24/2017  4:00 PM

## 2017-12-30 NOTE — NURSING NOTE
Patient Information     Patient Name MRN Sex Rose Doss [id number] Female 1970      Nursing Note by Val Brown at 2017  8:50 AM     Author:  Val Brown Service:  (none) Author Type:  (none)     Filed:  2017  9:08 AM Encounter Date:  2017 Status:  Signed     :  Val Brown            Universal Protocol    A. Pre-procedure verification complete yes  1-relevant information / documentation available, reviewed and properly matched to the patient; 2-consent accurate and complete, 3-equipment and supplies available    B. Site marking complete No  Site marked if not in continuous attendance with patient    C. TIME OUT completed yes  Time Out was conducted just prior to starting procedure to verify the eight required elements: 1-patient identity, 2-consent accurate and complete, 3-position, 4-correct side/site marked (if applicable), 5-procedure, 6-relevant images / results properly labeled and displayed (if applicable), 7-antibiotics / irrigation fluids (if applicable), 8-safety precautions.  Val Brown LPN.......................... 2017  9:02 AM

## 2017-12-30 NOTE — NURSING NOTE
Patient Information     Patient Name MRN Rose Graham [id number] Female 1970      Nursing Note by Cara Paz at 2017  3:00 PM     Author:  Cara Paz Service:  (none) Author Type:  (none)     Filed:  2017  3:26 PM Encounter Date:  2017 Status:  Signed     :  Cara Paz            Patient presents to clinic today for lump on neck. She states she has been seen for this before and in the past two weeks it has become bigger, warm, and red.    Cara Paz LPN...................2017  3:01 PM

## 2017-12-30 NOTE — NURSING NOTE
Patient Information     Patient Name MRN Rose Graham [id number] Female 1970      Nursing Note by Cara Paz at 2017  9:30 AM     Author:  Cara Paz Service:  (none) Author Type:  (none)     Filed:  2017  9:50 AM Encounter Date:  2017 Status:  Signed     :  Cara Paz            Patient presents to clinic today for a physical.    Cara Paz LPN...................2017  9:29 AM

## 2017-12-30 NOTE — NURSING NOTE
Patient Information     Patient Name MRN Sex Rose Doss [id number] Female 1970      Nursing Note by Odalis Noble at 8/15/2017  3:30 PM     Author:  Odalis Noble Service:  (none) Author Type:  (none)     Filed:  8/15/2017  3:36 PM Encounter Date:  8/15/2017 Status:  Signed     :  Odalis Noble            Here today for suture removal.  Odalis Noble LPN..........8/15/2017  3:34 PM

## 2017-12-30 NOTE — NURSING NOTE
Patient Information     Patient Name MRN Rose Graham [id number] Female 1970      Nursing Note by Rupali Rucker at 8/10/2017 10:45 AM     Author:  Rupali Rucker Service:  (none) Author Type:  (none)     Filed:  8/10/2017 10:45 AM Encounter Date:  8/10/2017 Status:  Signed     :  Rupali Rucker            Patient says she has had a cough, nausea, headache, hot and cold, sore throat and fatigue for 2 weeks.  Rupali Rucker LPN ....................8/10/2017  10:42 AM

## 2017-12-30 NOTE — NURSING NOTE
Patient Information     Patient Name MRN Rose Graham [id number] Female 1970      Nursing Note by Val Brown at 2017  8:50 AM     Author:  Val Brown Service:  (none) Author Type:  (none)     Filed:  2017  8:53 AM Encounter Date:  2017 Status:  Signed     :  Val Brown            Patient is here today for a cyst on her right shoulder, she states that this was recently infected. Patient is due for a mammogram.  Val Brown LPN.......................... 2017  8:50 AM

## 2017-12-30 NOTE — NURSING NOTE
Patient Information     Patient Name MRN Rose Graham [id number] Female 1970      Nursing Note by Munira Roldan at 10/16/2017  2:00 PM     Author:  Munira Roldan Service:  (none) Author Type:  (none)     Filed:  10/16/2017  2:19 PM Encounter Date:  10/16/2017 Status:  Signed     :  Munira Roldan            Patient here for follow up to hernia and medication. Munira Roldan LPN .......................10/16/2017  2:16 PM

## 2018-01-02 NOTE — PATIENT INSTRUCTIONS
Patient Information     Patient Name MRN Sex Rose Doss [id number] Female 1970      Patient Instructions by Heather Smith NP at 2017 10:00 AM     Author:  Heather Smith NP Service:  (none) Author Type:  PHYS- Nurse Practitioner     Filed:  2017 10:22 AM Encounter Date:  2017 Status:  Signed     :  Heather Smith NP (PHYS- Nurse Practitioner)            Azithromycin daily x 5 days    Robitussin with codeine every 6 hours as needed    Encouraged fluids and rest.    May use symptomatic care with tylenol or ibuprofen.     Using a humidifier works well to break up the congestion.     Elevate the mattress to 15 degrees in order to help with the congestion.    Frequent swallows of cool liquid.      Oatmeal or honey coats the throat and some patients find it soothes the pain.     Return to clinic with change/worsening of symptoms or concerns.

## 2018-01-02 NOTE — NURSING NOTE
Patient Information     Patient Name MRN Rose Graham [id number] Female 1970      Nursing Note by Lee Ann Glasgow at 2017 10:00 AM     Author:  Lee Ann Glasgow Service:  (none) Author Type:  NURS- Student Practical Nurse     Filed:  2017 10:15 AM Encounter Date:  2017 Status:  Signed     :  Lee Ann Glasgow (NURS- Student Practical Nurse)            Patient presents last week patient had a low grade fever. This week lethargy, cough nonproductive, runny nose, sinus pressure. Patient has attempted dayquil, nyquil and kasey seltzer cold. Lee Ann Glasgow LPN .............2017  10:08 AM

## 2018-01-03 NOTE — PROGRESS NOTES
Patient Information     Patient Name MRN Sex Rose Doss [id number] Female 1970      Progress Notes by Heather Smith NP at 2017 10:00 AM     Author:  Heather Smith NP Service:  (none) Author Type:  PHYS- Nurse Practitioner     Filed:  2017 10:29 AM Encounter Date:  2017 Status:  Signed     :  Heather Smith NP (PHYS- Nurse Practitioner)            HPI:    Rose Mendez is a 46 y.o. female who presents to clinic today for URI.   Started with sore throat a little over a week ago, improved.  Now with non productive cough, shortness of breath, runny nose, sinus pressure, and decreased energy for the past week.  Cough during day and night.  Cough worsening.  Intermittent chest tightness and congestion.  Sinus pressure along sides of nose and cheeks.  Left ear plugged for the past 3 days.  Low grade fevers intermittently around 100.  Chills and sweats.  Appetite decreased, can't taste.  Taking Dayquil, Nyquil, Ibuprofen, Vicks, and cough drops without relief.  Current daily smoker of 0.5 ppd.            Past Medical History      Diagnosis   Date     Anesthesia complication  2005     Notes prior complications from anesthesia:  (2005) Nausea      CMC arthritis, thumb, degenerative  2014     DYSESTHESIA       left side, resolved. Normal MRI      Fracture of left ankle       Ganglion cyst of wrist  9/3/2014     History of pregnancy       20 c- sections      Multiple thyroid nodules  2015     MERCEDES on CPAP  7/10/2015     Sleep studies and followup per Dr Alcantara 2015      Ulnar nerve surgery       (left Dr Riggs      Vitamin D deficiency  2015     Past Surgical History       Procedure   Laterality Date      section         section        with postpartum tubal sterilization Status post left elbow ulnar nerve transposition .         Ablation   5/4/10     endometrial       Vaginal hysterectomy   7/6/10      Laparoscopic  "Assisted Vaginal Hysterectomy       Pr revise ulnar nerve at wrist   2005     left, Riggs       Right wrist surgery   2015     Social History       Substance Use Topics         Smoking status:   Current Every Day Smoker     Packs/day:  0.50     Years:  30.00     Types:  Cigarettes     Smokeless tobacco:   Never Used      Comment: occ - most days      Alcohol use   No     Current Outpatient Prescriptions       Medication  Sig Dispense Refill     Cholecalciferol, Vitamin D3, (VITAMIN D-3) 2,000 unit tablet Take 1 tablet by mouth once daily. 90 tablet 4     ibuprofen (ADVIL; MOTRIN) 200 mg tablet Take 400 mg by mouth.       No current facility-administered medications for this visit.      Medications have been reviewed by me and are current to the best of my knowledge and ability.    No Known Allergies    ROS:  Refer to HPI    Visit Vitals       /98     Pulse 95     Temp 97.9  F (36.6  C) (Temporal)     Resp 16     Ht 1.68 m (5' 6.14\")     Wt 83 kg (183 lb)     SpO2 95%  Comment: RA     Breastfeeding No     BMI 29.41 kg/m2       EXAM:  General Appearance: Miserable appearing adult female, appropriate appearance for age. No acute distress  Head: normocephalic, atraumatic  Ears: Left TM with decreased bony landmarks appreciated, bright erythema with serous effusion, no bulging.  Right TM with bony landmarks appreciated, no erythema, no effusion, no bulging, no purulence.   Left auditory canal clear.  Right auditory canal clear.  Normal external ears, non tender.  Eyes: conjunctivae normal, no drainage  Orophayrnx: moist mucous membranes, posterior pharynx without erythema, tonsils without hypertrophy, no erythema, no exudates or petechiae, no post nasal drip seen.    No sinus pain upon palpation of the frontal, maxillary, or ethmoid sinuses  Neck: tonsillar lymph nodes with enlargement, non tender to palpation  Respiratory: normal chest wall and respirations.  Normal effort.  Clear to auscultation bilaterally, no " wheezes or rhonchi or congestion, frequent deep course congested cough appreciated, oxygen saturation 95%  Cardiac: RRR with no murmurs  Psychological: normal affect, alert and pleasant    ASSESSMENT/PLAN:    ICD-10-CM    1. Shortness of breath R06.02 OH PULSE OXIMETRY SINGLE DETERMINATION   2. Cough R05 codeine-guaiFENesin (ROBITUSSIN AC)  mg/5 mL liquid   3. Acute bronchitis, unspecified organism J20.9 azithromycin (ZITHROMAX Z-DANIELA) 250 mg tablet      codeine-guaiFENesin (ROBITUSSIN AC)  mg/5 mL liquid   4. Acute non-suppurative otitis media, left H65.192    5. Acute URI J06.9          Worsening congested cough for the past week, sounds bronchial.  Likely still in viral phase but due to smoking and persistent/worsening symptoms will treat for bronchitis.  Azithromycin daily x 5 days (z daniela dosing)  Robitussin with Codeine 10 ml Q 6 hours PRN  Encouraged fluids  Symptomatic treatment - humidifier, honey, lozenges, etc   Tylenol or ibuprofen PRN  Encouraged smoking cessation  Follow up if symptoms persist or worsen or concerns          Patient Instructions   Azithromycin daily x 5 days    Robitussin with codeine every 6 hours as needed    Encouraged fluids and rest.    May use symptomatic care with tylenol or ibuprofen.     Using a humidifier works well to break up the congestion.     Elevate the mattress to 15 degrees in order to help with the congestion.    Frequent swallows of cool liquid.      Oatmeal or honey coats the throat and some patients find it soothes the pain.     Return to clinic with change/worsening of symptoms or concerns.

## 2018-01-03 NOTE — NURSING NOTE
Patient Information     Patient Name MRRose Billingsley [id number] Female 1970      Nursing Note by Martina Landeros at 3/20/2017  9:45 AM     Author:  Martina Landeros Service:  (none) Author Type:  (none)     Filed:  3/20/2017 10:15 AM Encounter Date:  3/20/2017 Status:  Signed     :  Martina Landeros            Patient presents to the clinic today for a left arm pain and chest pain.  Martina Landeros ....................  3/20/2017   9:49 AM

## 2018-01-04 NOTE — PROGRESS NOTES
Patient Information     Patient Name MRN Rose Graham [id number] Female 1970      Progress Notes by Telma Garcia NP at 3/20/2017  9:45 AM     Author:  Telma Garcia NP Service:  (none) Author Type:  PHYS- Nurse Practitioner     Filed:  3/21/2017  6:26 PM Encounter Date:  3/20/2017 Status:  Signed     :  Telma Garcia NP (PHYS- Nurse Practitioner)            .

## 2018-01-04 NOTE — TELEPHONE ENCOUNTER
Patient Information     Patient Name MRN Rose Graham [id number] Female 1970      Telephone Encounter by Marisol Kaiser at 3/21/2017 11:46 AM     Author:  Marisol Kaiser Service:  (none) Author Type:  (none)     Filed:  3/21/2017 11:50 AM Encounter Date:  3/21/2017 Status:  Signed     :  Marisol Kaiser            Spoke with patient, she was wondering what to do about her blood pressure, states while in the ED some readings were 134/93, 138/111, 131/104, 143/97, 140/97. She is able to check it at work.  Should she keep an eye on them for awhile or she is wondering if she should be on b/p medication.  She is aware Telma Garcia is out of the office until tomorrow.   Poornima Kaiser LPN ...... 3/21/2017 11:50 AM

## 2018-01-05 NOTE — PATIENT INSTRUCTIONS
Patient Information     Patient Name MRN Rose Graham [id number] Female 1970      Patient Instructions by Renetta Alex NP at 2017 10:30 AM     Author:  Renetta Alex NP  Service:  (none) Author Type:  PHYS- Nurse Practitioner     Filed:  2017 10:52 AM  Encounter Date:  2017 Status:  Addendum     :  Renetta Alex NP (PHYS- Nurse Practitioner)        Related Notes: Original Note by Renetta Alex NP (PHYS- Nurse Practitioner) filed at 2017 10:51 AM            Ice for 20 minutes every 2-3 hours    Heat after day 3 of injury 3-4 times a day for 20 minutes and can alternate with Ice.    Exercises once a day for three days, then increase to 2 times a day for three days, then increase again to 3 times a day until pain is gone.    Ibuprofen up to 800 mg three times a day will help.     Tylenol as needed.     If in 2 weeks not better or improving call or come back      I have prescribed a narcotic:    1) Please make sure you read the handout the pharmacist gives you on this drug.   2) Any unused narcotics need to be destroyed or taken to the police department.   3) Do not save unused narcotics   4) Do not give your pills to other people.   5) Do not crush, snort or otherwise alter the drug  6) Do not use in combination with other drugs such as benadryl, alcohol, or benzodiopines.   7) If you have any questions on your medication please call your pharmacist or PCP office.   8) This will not be refilled.

## 2018-01-05 NOTE — NURSING NOTE
Patient Information     Patient Name MRN Rose Graham [id number] Female 1970      Nursing Note by Susie Nicole at 2017 10:30 AM     Author:  Susie Nicole Service:  (none) Author Type:  (none)     Filed:  2017 10:49 AM Encounter Date:  2017 Status:  Signed     :  Susie Nicole            Patient presents to clinic with lower back pain. Pain started Monday May 22nd.  Susie Landin ....................  2017   10:31 AM

## 2018-01-05 NOTE — PROGRESS NOTES
Patient Information     Patient Name MRN Sex Rose Doss [id number] Female 1970      Progress Notes by Renetta Alex NP at 2017 10:30 AM     Author:  Renetta Alex NP  Service:  (none) Author Type:  PHYS- Nurse Practitioner     Filed:  2017  2:04 PM  Encounter Date:  2017 Status:  Addendum     :  Renetta Alex NP (PHYS- Nurse Practitioner)        Related Notes: Original Note by Renetta Alex NP (PHYS- Nurse Practitioner) filed at 2017  2:02 PM            Nursing Notes:   Nicole, Susie AVILA  2017 10:49 AM  Signed  Patient presents to clinic with lower back pain. Pain started Monday May 22nd.  Susie AVILA Urvashi ....................  2017   10:31 AM    SUBJECTIVE:    Rose Mendez is a 46 y.o. female who presents for Low back pain    Back Pain   This is a new problem. Episode onset: 17, 4 days. The problem occurs constantly. The problem is unchanged. The pain is present in the lumbar spine and sacro-iliac. The quality of the pain is described as aching, cramping, shooting and stabbing. The pain does not radiate. The pain is moderate. The pain is worse during the night. The symptoms are aggravated by bending, lying down, standing, sitting and twisting. Pertinent negatives include no abdominal pain, bladder incontinence, bowel incontinence, dysuria, numbness, paresis, paresthesias, pelvic pain, perianal numbness, tingling, weakness or weight loss. Risk factors: Was painting the day prior. She has tried heat, ice and NSAIDs for the symptoms. The treatment provided no relief.       Current Outpatient Prescriptions on File Prior to Visit       Medication  Sig Dispense Refill     Cholecalciferol, Vitamin D3, (VITAMIN D-3) 2,000 unit tablet Take 1 tablet by mouth once daily. 90 tablet 4     ibuprofen (ADVIL; MOTRIN) 200 mg tablet Take 400 mg by mouth.       No current facility-administered medications on file prior to visit.        REVIEW OF  "SYSTEMS:  Review of Systems   Constitutional: Negative for weight loss.   Gastrointestinal: Negative for abdominal pain and bowel incontinence.   Genitourinary: Negative for bladder incontinence, dysuria and pelvic pain.   Musculoskeletal: Positive for back pain.   Neurological: Negative for tingling, weakness, numbness and paresthesias.       OBJECTIVE:  /74  Pulse 64  Temp 98  F (36.7  C) (Temporal)  Resp 20  Ht 1.676 m (5' 6\")  Wt 87.1 kg (192 lb)  BMI 30.99 kg/m2    EXAM:   Physical Exam   Constitutional: She is oriented to person, place, and time and well-developed, well-nourished, and in no distress.   HENT:   Head: Normocephalic and atraumatic.   Eyes: Conjunctivae are normal.   Neck: Normal range of motion. Neck supple.   Cardiovascular: Normal rate.    Pulmonary/Chest: Effort normal. No respiratory distress.   Musculoskeletal:        Lumbar back: She exhibits decreased range of motion, tenderness, pain and spasm. She exhibits no swelling and no edema.        Back:    Diagram shows where she is most sore. Anjum's test negative, Straight leg negative. She has 4/5 strength in the LT leg and 5/5 on the Right. Reflexes +2 bilateral. She is able to get up form sitting without use of hands, is able to heel to toe walk, can walk on heels and on tip toes.    Neurological: She is alert and oriented to person, place, and time. Gait normal.   Skin: Skin is warm and dry. No rash noted.   Psychiatric: Mood and affect normal.   Nursing note and vitals reviewed.      ASSESSMENT/PLAN:    ICD-10-CM    1. Acute right-sided low back pain without sciatica M54.5 cyclobenzaprine (FLEXERIL) 10 mg tablet      ibuprofen (ADVIL; MOTRIN) 800 mg tablet      traMADol (ULTRAM) 50 mg tablet        Plan:  No red flags, therefore imagining is not indicated. LT leg slightly weaker then RT, however does not correlate with where she hurts in her back. Will have her treat with OTC and home cares. Flexeril for a week, Ultram for " about a week. F/U if worsens or not improving in the next 2 weeks. I explained my diagnostic considerations and recommendations to the patient, who voiced understanding and agreement with the treatment plan. All questions were answered. We discussed potential side effects of any prescribed or recommended therapies, as well as expectations for response to treatments. She was advised to contact our office if there is no improvement or worsening of conditions or symptoms.  If s/s worsen or persist, patient will either come back or follow up with PCP.         ROHIT NEGRON NP ....................  5/26/2017   2:02 PM

## 2018-01-27 VITALS
HEART RATE: 64 BPM | SYSTOLIC BLOOD PRESSURE: 142 MMHG | DIASTOLIC BLOOD PRESSURE: 74 MMHG | DIASTOLIC BLOOD PRESSURE: 90 MMHG | SYSTOLIC BLOOD PRESSURE: 132 MMHG | BODY MASS INDEX: 30.99 KG/M2 | HEART RATE: 72 BPM | WEIGHT: 190.56 LBS | DIASTOLIC BLOOD PRESSURE: 80 MMHG | BODY MASS INDEX: 31.34 KG/M2 | SYSTOLIC BLOOD PRESSURE: 114 MMHG | WEIGHT: 192 LBS | WEIGHT: 195 LBS | HEIGHT: 66 IN | BODY MASS INDEX: 30.63 KG/M2 | HEIGHT: 66 IN

## 2018-01-27 VITALS
HEART RATE: 64 BPM | BODY MASS INDEX: 30.86 KG/M2 | DIASTOLIC BLOOD PRESSURE: 74 MMHG | HEIGHT: 66 IN | RESPIRATION RATE: 20 BRPM | WEIGHT: 192 LBS | TEMPERATURE: 98 F | SYSTOLIC BLOOD PRESSURE: 124 MMHG

## 2018-01-27 VITALS
SYSTOLIC BLOOD PRESSURE: 128 MMHG | SYSTOLIC BLOOD PRESSURE: 126 MMHG | TEMPERATURE: 97.9 F | WEIGHT: 192 LBS | BODY MASS INDEX: 30.86 KG/M2 | HEART RATE: 73 BPM | HEART RATE: 95 BPM | OXYGEN SATURATION: 97 % | DIASTOLIC BLOOD PRESSURE: 80 MMHG | WEIGHT: 183 LBS | RESPIRATION RATE: 16 BRPM | TEMPERATURE: 97.9 F | SYSTOLIC BLOOD PRESSURE: 138 MMHG | HEIGHT: 66 IN | HEART RATE: 80 BPM | BODY MASS INDEX: 29.41 KG/M2 | OXYGEN SATURATION: 95 % | WEIGHT: 191.13 LBS | BODY MASS INDEX: 30.72 KG/M2 | HEIGHT: 66 IN | DIASTOLIC BLOOD PRESSURE: 82 MMHG | DIASTOLIC BLOOD PRESSURE: 98 MMHG | HEIGHT: 66 IN

## 2018-01-27 VITALS
BODY MASS INDEX: 30.82 KG/M2 | TEMPERATURE: 98.1 F | HEIGHT: 66 IN | HEART RATE: 76 BPM | WEIGHT: 192 LBS | WEIGHT: 191.8 LBS | SYSTOLIC BLOOD PRESSURE: 120 MMHG | BODY MASS INDEX: 30.86 KG/M2 | HEIGHT: 66 IN | DIASTOLIC BLOOD PRESSURE: 80 MMHG | SYSTOLIC BLOOD PRESSURE: 132 MMHG | DIASTOLIC BLOOD PRESSURE: 72 MMHG | RESPIRATION RATE: 16 BRPM

## 2018-01-27 VITALS
HEART RATE: 76 BPM | SYSTOLIC BLOOD PRESSURE: 130 MMHG | BODY MASS INDEX: 31.2 KG/M2 | DIASTOLIC BLOOD PRESSURE: 82 MMHG | HEIGHT: 66 IN | WEIGHT: 194.13 LBS

## 2018-01-27 VITALS — WEIGHT: 191.2 LBS | SYSTOLIC BLOOD PRESSURE: 110 MMHG | HEART RATE: 80 BPM | DIASTOLIC BLOOD PRESSURE: 70 MMHG

## 2018-01-30 ASSESSMENT — PATIENT HEALTH QUESTIONNAIRE - PHQ9: SUM OF ALL RESPONSES TO PHQ QUESTIONS 1-9: 0

## 2018-02-16 ENCOUNTER — DOCUMENTATION ONLY (OUTPATIENT)
Dept: FAMILY MEDICINE | Facility: OTHER | Age: 48
End: 2018-02-16

## 2018-02-16 ENCOUNTER — OFFICE VISIT (OUTPATIENT)
Dept: FAMILY MEDICINE | Facility: OTHER | Age: 48
End: 2018-02-16
Attending: NURSE PRACTITIONER
Payer: COMMERCIAL

## 2018-02-16 VITALS
HEART RATE: 80 BPM | BODY MASS INDEX: 30.42 KG/M2 | SYSTOLIC BLOOD PRESSURE: 124 MMHG | WEIGHT: 188.5 LBS | DIASTOLIC BLOOD PRESSURE: 82 MMHG

## 2018-02-16 DIAGNOSIS — E55.9 VITAMIN D DEFICIENCY: ICD-10-CM

## 2018-02-16 DIAGNOSIS — K52.9 CHRONIC DIARRHEA: ICD-10-CM

## 2018-02-16 DIAGNOSIS — Z86.19 HISTORY OF CLOSTRIDIUM DIFFICILE INFECTION: Primary | ICD-10-CM

## 2018-02-16 DIAGNOSIS — E78.00 PURE HYPERCHOLESTEROLEMIA: ICD-10-CM

## 2018-02-16 DIAGNOSIS — F17.200 TOBACCO DEPENDENCE: ICD-10-CM

## 2018-02-16 DIAGNOSIS — K21.9 GASTROESOPHAGEAL REFLUX DISEASE WITHOUT ESOPHAGITIS: ICD-10-CM

## 2018-02-16 PROBLEM — Z72.0 TOBACCO ABUSE: Status: ACTIVE | Noted: 2018-02-16

## 2018-02-16 PROBLEM — F41.1 ANXIETY STATE: Status: ACTIVE | Noted: 2018-02-16

## 2018-02-16 PROCEDURE — 99213 OFFICE O/P EST LOW 20 MIN: CPT | Performed by: NURSE PRACTITIONER

## 2018-02-16 RX ORDER — ROSUVASTATIN CALCIUM 10 MG/1
10 TABLET, COATED ORAL DAILY
Qty: 30 TABLET | Refills: 3 | Status: SHIPPED | OUTPATIENT
Start: 2018-02-16 | End: 2018-07-27

## 2018-02-16 RX ORDER — ROSUVASTATIN CALCIUM 10 MG/1
10 TABLET, COATED ORAL DAILY
COMMUNITY
Start: 2017-10-16 | End: 2018-02-16

## 2018-02-16 ASSESSMENT — ENCOUNTER SYMPTOMS
RESPIRATORY NEGATIVE: 1
NEUROLOGICAL NEGATIVE: 1
EYES NEGATIVE: 1
MUSCULOSKELETAL NEGATIVE: 1
ALLERGIC/IMMUNOLOGIC NEGATIVE: 1
DIARRHEA: 1
PSYCHIATRIC NEGATIVE: 1
CARDIOVASCULAR NEGATIVE: 1
ENDOCRINE NEGATIVE: 1
HEMATOLOGIC/LYMPHATIC NEGATIVE: 1
CONSTITUTIONAL NEGATIVE: 1

## 2018-02-16 ASSESSMENT — PAIN SCALES - GENERAL: PAINLEVEL: NO PAIN (0)

## 2018-02-16 NOTE — PROGRESS NOTES
SUBJECTIVE:   Rose Mendez is a 47 year old female who presents to clinic today for the following health issues:  Medication review on her rosuvastatin and refill.  Has been out of her medication for a few months and reports has been too busy to come in for refill.      Patient also has other issues, would like to discuss chronic issues with diarrhea  This started last summer, she did see Dr. Vegas for review on who did some stool studies and had positive C. difficile was treated with 1 round of metronidazole she believes that  She noticed mild improvement at best less diarrhea alternating with some formed stools and occasional hard stools but reports she has been back to watery stools 2 or 3 times a day  For over a month, But feels not quite as severe as previous.  She denies any recent history of antibiotics, denies any fevers, blood or mucus in stools.  Denies any changes in water or diet, over-the-counter medications changes    HPI    Patient Active Problem List   Diagnosis     Anxiety state     CMC arthritis, thumb, degenerative     Ganglion cyst of wrist     History of hysterectomy     Multiple thyroid nodules     MERCEDES on CPAP     Snoring     Tobacco abuse     Tobacco dependence     Vitamin D deficiency     Past Surgical History:   Procedure Laterality Date     [place] REVISE ULNAR NERVE AT WRIST Left 2005,53416.0,[place] REVISE ULNAR NERVE AT WRIST,left, [facility]      SECTION            SECTION      ,with postpartum tubal sterilization Status post left elbow ulnar nerve transposition .     HC ABLATION, ENDOMETRIAL, THERMAL, W/O HYSTEROSCOPIC GUIDANCE      5/4/10,465227,ABLATION,endometrial     HYSTERECTOMY VAGINAL      7/6/10,Laparoscopic Assisted Vaginal Hysterectomy       Social History   Substance Use Topics     Smoking status: Current Every Day Smoker     Packs/day: 1.00     Years: 30.00     Types: Cigarettes     Smokeless tobacco: Never Used      Comment: Quit smoking: occ  - most days     Alcohol use No     Family History   Problem Relation Age of Onset     Family History Negative Daughter      Good Health     Family History Negative Daughter      Good Health     Family History Negative Daughter      Good Health     Prostate Cancer Father      Cancer-prostate     Hypertension Mother      Hypertension     DIABETES Maternal Grandmother      Diabetes     HEART DISEASE Paternal Grandfather 62     Heart Disease,MI      Coronary Artery Disease Paternal Grandfather 62     Coronary artery disease     Hypertension Maternal Aunt      Hypertension     DIABETES Maternal Aunt      Diabetes     Breast Cancer Maternal Aunt 70     Cancer-breast     Coronary Artery Disease Maternal Aunt 60     Coronary artery disease     Seizure Disorder Sister      Seizures     Thyroid Disease Sister      Thyroid Disease     Hypertension Maternal Uncle      Hypertension     DIABETES Maternal Uncle      Diabetes     Coronary Artery Disease Maternal Uncle 40     Coronary artery disease     DIABETES Paternal Aunt      Diabetes     DIABETES Paternal Uncle      Diabetes     Allergy (Severe) No family hx of      Allergies     Anesthesia Reaction No family hx of      Anesthesia Problem     Blood Disease No family hx of      Blood Disease     Colon Cancer No family hx of      Cancer-colon     Ovarian Cancer No family hx of      Cancer-ovarian     Other - See Comments No family hx of      Stroke         Current Outpatient Prescriptions   Medication Sig Dispense Refill     Cholecalciferol (D 5000) 5000 UNITS TABS Take 5,000 Units by mouth daily 30 tablet 2     omeprazole (PRILOSEC) 20 MG CR capsule Take 1 capsule (20 mg) by mouth daily 90 capsule 1     rosuvastatin (CRESTOR) 10 MG tablet Take 1 tablet (10 mg) by mouth daily 30 tablet 3     No Known Allergies  BP Readings from Last 3 Encounters:   18 124/82   10/24/17 152/86   10/24/17 132/80    Wt Readings from Last 3 Encounters:   18 188 lb 8 oz (85.5 kg)    10/24/17 190 lb 9 oz (86.4 kg)   10/16/17 192 lb (87.1 kg)                  Labs reviewed in EPIC    Review of Systems   Constitutional: Negative.    HENT: Negative.    Eyes: Negative.    Respiratory: Negative.    Cardiovascular: Negative.    Gastrointestinal: Positive for diarrhea.   Endocrine: Negative.    Genitourinary: Negative.    Musculoskeletal: Negative.    Skin: Negative.    Allergic/Immunologic: Negative.    Neurological: Negative.    Hematological: Negative.    Psychiatric/Behavioral: Negative.         OBJECTIVE:     /82 (BP Location: Right arm, Patient Position: Sitting, Cuff Size: Adult Regular)  Pulse 80  Wt 188 lb 8 oz (85.5 kg)  Breastfeeding? No  BMI 30.42 kg/m2  Body mass index is 30.42 kg/(m^2).  Physical Exam   Constitutional: She is oriented to person, place, and time. She appears well-developed and well-nourished.   Cardiovascular: Normal rate.    Pulmonary/Chest: Effort normal.   Musculoskeletal: Normal range of motion.   Neurological: She is alert and oriented to person, place, and time.   Skin: Skin is warm and dry.   Psychiatric: She has a normal mood and affect. Her behavior is normal. Judgment and thought content normal.   Nursing note and vitals reviewed.          ASSESSMENT/PLAN:     Chronic diarrhea    History of C. difficile diarrhea treated once    GERD  Tobacco use  Lipidemia    Plan: Refill medications and will need fasting lipid in 3 months for she runs out of medication  Referral to Dr. Vegas For follow-up on chronic diarrhea--- stool labs pending    Strongly encouraged tobacco cessation-declined      DAVID Bello [facility] [place] [facility] [place]        The 10-year ASCVD risk score (Melissatodd KEENAN Jr, et al., 2013) is: 7.1%    Values used to calculate the score:      Age: 47 years      Sex: Female      Is Non- : No      Diabetic: No      Tobacco smoker: Yes      Systolic Blood Pressure: 124 mmHg      Is BP treated: No      HDL Cholesterol:  34 mg/dL      Total Cholesterol: 231 mg/dL

## 2018-02-16 NOTE — PATIENT INSTRUCTIONS
folowcristian rubin    Drop off stool specimins    Send me message in 3 month for lab only fasting lipids

## 2018-02-16 NOTE — MR AVS SNAPSHOT
"              After Visit Summary   2/16/2018    Rose Mendez    MRN: [id number]           Patient Information     Date Of Birth          1970        Visit Information        Provider Department      2/16/2018 8:45 AM Telma Garcia APRN CNP [facility]        Today's Diagnoses     History of Clostridium difficile infection    -  1    Chronic diarrhea        Pure hypercholesterolemia        Gastroesophageal reflux disease without esophagitis        Vitamin D deficiency          Care Instructions    folowup Dr rubin    Drop off stool specimins    Send me message in 3 month for lab only fasting lipids          Follow-ups after your visit        Follow-up notes from your care team     Return if symptoms worsen or fail to improve.      Future tests that were ordered for you today     Open Future Orders        Priority Expected Expires Ordered    Clostridium difficile Toxin B PCR Routine  3/18/2018 2/16/2018            Who to contact     If you have questions or need follow up information about today's clinic visit or your schedule please contact [facility] directly at [phone].  Normal or non-critical lab and imaging results will be communicated to you by [employer], letter or phone within 4 business days after the clinic has received the results. If you do not hear from us within 7 days, please contact the clinic through [employer] or phone. If you have a critical or abnormal lab result, we will notify you by phone as soon as possible.  Submit refill requests through [employer] or call your pharmacy and they will forward the refill request to us. Please allow 3 business days for your refill to be completed.          Additional Information About Your Visit        [employer] Information     [employer] lets you send messages to your doctor, view your test results, renew your prescriptions, schedule appointments and more. To sign up, go to [email or web address] . Click on \"Log " "in\" on the left side of the screen, which will take you to the Welcome page. Then click on \"Sign up Now\" on the right side of the page.     You will be asked to enter the access code listed below, as well as some personal information. Please follow the directions to create your username and password.     Your access code is: [email or web address]  Expires: 2018  9:21 AM     Your access code will  in 90 days. If you need help or a new code, please call your [place] clinic or [phone].        Care EveryWhere ID     This is your Care EveryWhere ID. This could be used by other organizations to access your [place] medical records  [id number]        Your Vitals Were     Pulse Breastfeeding? BMI (Body Mass Index)             80 No 30.42 kg/m2          Blood Pressure from Last 3 Encounters:   18 124/82   10/24/17 152/86   10/24/17 132/80    Weight from Last 3 Encounters:   18 188 lb 8 oz (85.5 kg)   10/24/17 190 lb 9 oz (86.4 kg)   10/16/17 192 lb (87.1 kg)              We Performed the Following     Occult blood stool          Where to get your medicines      These medications were sent to [id number] - [place], [place] - [address]  [address], [facility] [address]     Phone:  [phone]     Cholecalciferol 5000 UNITS Tabs    omeprazole 20 MG CR capsule    rosuvastatin 10 MG tablet          Primary Care Provider Office Phone # Fax #    Telma Garcia, APRN [phone]       [address] [facility] [address]        Equal Access to Services     [facility] Center[facility] AH: Hadii gissel Rahman, waaxda luqadaha, qaybta kaalmada james, geronimo charles. So [facility] [phone].    ATENCIÓN: Si habla español, tiene a posada disposición servicios gratuitos de asistencia lingüística. Llame al [phone].    We comply with applicable federal civil rights laws and [place] laws. We do not discriminate on the " basis of race, color, national origin, age, disability, sex, sexual orientation, or gender identity.            Thank you!     Thank you for choosing [facility] AND \[facility] [place] [facility]\""  for your care. Our goal is always to provide you with excellent care. Hearing back from our patients is one way we can continue to improve our services. Please take a few minutes to complete the written survey that you may receive in the mail after your visit with us. Thank you!             Your Updated Medication List - Protect others around you: Learn how to safely use, store and throw away your medicines at www.disposemymeds.org.          This list is accurate as of 2/16/18  9:21 AM.  Always use your most recent med list.                   Brand Name Dispense Instructions for use Diagnosis    Cholecalciferol 5000 UNITS Tabs    D 5000    30 tablet    Take 5,000 Units by mouth daily    Vitamin D deficiency       omeprazole 20 MG CR capsule    priLOSEC    90 capsule    Take 1 capsule (20 mg) by mouth daily    Gastroesophageal reflux disease without esophagitis       rosuvastatin 10 MG tablet    CRESTOR    30 tablet    Take 1 tablet (10 mg) by mouth daily    Pure hypercholesterolemia

## 2018-02-16 NOTE — NURSING NOTE
Patient presents to clinic today for medication management.    Cara Paz LPN...................2/16/2018  8:49 AM

## 2018-03-01 ENCOUNTER — SURGERY (OUTPATIENT)
Age: 48
End: 2018-03-01

## 2018-03-01 ENCOUNTER — HOSPITAL ENCOUNTER (OUTPATIENT)
Facility: OTHER | Age: 48
Discharge: HOME | End: 2018-03-01
Attending: SURGERY | Admitting: SURGERY
Payer: COMMERCIAL

## 2018-03-01 ENCOUNTER — ANESTHESIA EVENT (OUTPATIENT)
Dept: SURGERY | Facility: OTHER | Age: 48
End: 2018-03-01
Payer: COMMERCIAL

## 2018-03-01 ENCOUNTER — ANESTHESIA (OUTPATIENT)
Dept: SURGERY | Facility: OTHER | Age: 48
End: 2018-03-01
Payer: COMMERCIAL

## 2018-03-01 VITALS
TEMPERATURE: 97.9 F | HEART RATE: 70 BPM | RESPIRATION RATE: 18 BRPM | OXYGEN SATURATION: 96 % | DIASTOLIC BLOOD PRESSURE: 96 MMHG | SYSTOLIC BLOOD PRESSURE: 134 MMHG

## 2018-03-01 DIAGNOSIS — K57.30 DIVERTICULOSIS OF COLON WITHOUT DIVERTICULITIS: ICD-10-CM

## 2018-03-01 DIAGNOSIS — K62.1 RECTAL POLYP: Primary | ICD-10-CM

## 2018-03-01 PROCEDURE — 25000125 ZZHC RX 250: Performed by: NURSE ANESTHETIST, CERTIFIED REGISTERED

## 2018-03-01 PROCEDURE — 40000010 ZZH STATISTIC ANES STAT CODE-CRNA PER MINUTE: Performed by: SURGERY

## 2018-03-01 PROCEDURE — 27210995 ZZH RX 272: Performed by: SURGERY

## 2018-03-01 PROCEDURE — 45380 COLONOSCOPY AND BIOPSY: CPT | Performed by: SURGERY

## 2018-03-01 PROCEDURE — 25000128 H RX IP 250 OP 636: Performed by: SURGERY

## 2018-03-01 PROCEDURE — 88305 TISSUE EXAM BY PATHOLOGIST: CPT

## 2018-03-01 PROCEDURE — 25000128 H RX IP 250 OP 636: Performed by: NURSE ANESTHETIST, CERTIFIED REGISTERED

## 2018-03-01 PROCEDURE — 45380 COLONOSCOPY AND BIOPSY: CPT

## 2018-03-01 RX ORDER — ONDANSETRON 2 MG/ML
4 INJECTION INTRAMUSCULAR; INTRAVENOUS EVERY 6 HOURS PRN
Status: DISCONTINUED | OUTPATIENT
Start: 2018-03-01 | End: 2018-03-01 | Stop reason: HOSPADM

## 2018-03-01 RX ORDER — LIDOCAINE 40 MG/G
CREAM TOPICAL
Status: DISCONTINUED | OUTPATIENT
Start: 2018-03-01 | End: 2018-03-01 | Stop reason: HOSPADM

## 2018-03-01 RX ORDER — SODIUM CHLORIDE, SODIUM LACTATE, POTASSIUM CHLORIDE, CALCIUM CHLORIDE 600; 310; 30; 20 MG/100ML; MG/100ML; MG/100ML; MG/100ML
INJECTION, SOLUTION INTRAVENOUS CONTINUOUS
Status: DISCONTINUED | OUTPATIENT
Start: 2018-03-01 | End: 2018-03-01 | Stop reason: HOSPADM

## 2018-03-01 RX ORDER — ONDANSETRON 4 MG/1
4 TABLET, ORALLY DISINTEGRATING ORAL EVERY 6 HOURS PRN
Status: DISCONTINUED | OUTPATIENT
Start: 2018-03-01 | End: 2018-03-01 | Stop reason: HOSPADM

## 2018-03-01 RX ORDER — PROPOFOL 10 MG/ML
INJECTION, EMULSION INTRAVENOUS CONTINUOUS PRN
Status: DISCONTINUED | OUTPATIENT
Start: 2018-03-01 | End: 2018-03-01

## 2018-03-01 RX ORDER — LIDOCAINE HYDROCHLORIDE 20 MG/ML
INJECTION, SOLUTION INFILTRATION; PERINEURAL PRN
Status: DISCONTINUED | OUTPATIENT
Start: 2018-03-01 | End: 2018-03-01

## 2018-03-01 RX ORDER — NALOXONE HYDROCHLORIDE 0.4 MG/ML
.1-.4 INJECTION, SOLUTION INTRAMUSCULAR; INTRAVENOUS; SUBCUTANEOUS
Status: DISCONTINUED | OUTPATIENT
Start: 2018-03-01 | End: 2018-03-01 | Stop reason: HOSPADM

## 2018-03-01 RX ORDER — ONDANSETRON 2 MG/ML
4 INJECTION INTRAMUSCULAR; INTRAVENOUS
Status: DISCONTINUED | OUTPATIENT
Start: 2018-03-01 | End: 2018-03-01 | Stop reason: HOSPADM

## 2018-03-01 RX ORDER — PROPOFOL 10 MG/ML
INJECTION, EMULSION INTRAVENOUS PRN
Status: DISCONTINUED | OUTPATIENT
Start: 2018-03-01 | End: 2018-03-01

## 2018-03-01 RX ORDER — FLUMAZENIL 0.1 MG/ML
0.2 INJECTION, SOLUTION INTRAVENOUS
Status: DISCONTINUED | OUTPATIENT
Start: 2018-03-01 | End: 2018-03-01 | Stop reason: HOSPADM

## 2018-03-01 RX ADMIN — WATER 100 ML: 1 IRRIGANT IRRIGATION at 13:15

## 2018-03-01 RX ADMIN — PROPOFOL 150 MG: 10 INJECTION, EMULSION INTRAVENOUS at 12:53

## 2018-03-01 RX ADMIN — SODIUM CHLORIDE, SODIUM LACTATE, POTASSIUM CHLORIDE, AND CALCIUM CHLORIDE: 600; 310; 30; 20 INJECTION, SOLUTION INTRAVENOUS at 11:08

## 2018-03-01 RX ADMIN — PROPOFOL 140 MCG/KG/MIN: 10 INJECTION, EMULSION INTRAVENOUS at 12:53

## 2018-03-01 RX ADMIN — LIDOCAINE HYDROCHLORIDE 40 MG: 20 INJECTION, SOLUTION INFILTRATION; PERINEURAL at 12:53

## 2018-03-01 ASSESSMENT — LIFESTYLE VARIABLES: TOBACCO_USE: 1

## 2018-03-01 NOTE — IP AVS SNAPSHOT
[facility]    [address]    [place] [address]    Phone:  [phone]    Fax:  [phone]                                       After Visit Summary   3/1/2018    Rose Mendez    MRN: [id number]           After Visit Summary Signature Page     I have received my discharge instructions, and my questions have been answered. I have discussed any challenges I see with this plan with the nurse or doctor.    ..........................................................................................................................................  Patient/Patient Representative Signature      ..........................................................................................................................................  Patient Representative Print Name and Relationship to Patient    ..................................................               ................................................  Date                                            Time    ..........................................................................................................................................  Reviewed by Signature/Title    ...................................................              ..............................................  Date                                                            Time

## 2018-03-01 NOTE — ANESTHESIA CARE TRANSFER NOTE
Patient: Rose Mendez    Procedure(s):  Colonoscopy - Wound Class: III-Contaminated    Diagnosis: diarrhea, abdominal pain  Diagnosis Additional Information: No value filed.    Anesthesia Type:   MAC     Note:    Patient transferred to:Phase II  Handoff Report: Identifed the Patient, Identified the Reponsible Provider, Reviewed the pertinent medical history, Discussed the surgical course, Reviewed Intra-OP anesthesia mangement and issues during anesthesia, Set expectations for post-procedure period and Allowed opportunity for questions and acknowledgement of understanding      Vitals: (Last set prior to Anesthesia Care Transfer)              Electronically Signed By: DAVID SEVILLA CRNA  March 1, 2018  1:24 PM

## 2018-03-01 NOTE — H&P (VIEW-ONLY)
SUBJECTIVE:   Rose Mendez is a 47 year old female who presents to clinic today for the following health issues:  Medication review on her rosuvastatin and refill.  Has been out of her medication for a few months and reports has been too busy to come in for refill.      Patient also has other issues, would like to discuss chronic issues with diarrhea  This started last summer, she did see Dr. Vegas for review on who did some stool studies and had positive C. difficile was treated with 1 round of metronidazole she believes that  She noticed mild improvement at best less diarrhea alternating with some formed stools and occasional hard stools but reports she has been back to watery stools 2 or 3 times a day  For over a month, But feels not quite as severe as previous.  She denies any recent history of antibiotics, denies any fevers, blood or mucus in stools.  Denies any changes in water or diet, over-the-counter medications changes    HPI    Patient Active Problem List   Diagnosis     Anxiety state     CMC arthritis, thumb, degenerative     Ganglion cyst of wrist     History of hysterectomy     Multiple thyroid nodules     MERCEDES on CPAP     Snoring     Tobacco abuse     Tobacco dependence     Vitamin D deficiency     Past Surgical History:   Procedure Laterality Date     [place] REVISE ULNAR NERVE AT WRIST Left 2005,82320.0,[place] REVISE ULNAR NERVE AT WRIST,left, [place]      SECTION            SECTION      ,with postpartum tubal sterilization Status post left elbow ulnar nerve transposition .     HC ABLATION, ENDOMETRIAL, THERMAL, W/O HYSTEROSCOPIC GUIDANCE      5/4/10,988886,ABLATION,endometrial     HYSTERECTOMY VAGINAL      7/6/10,Laparoscopic Assisted Vaginal Hysterectomy       Social History   Substance Use Topics     Smoking status: Current Every Day Smoker     Packs/day: 1.00     Years: 30.00     Types: Cigarettes     Smokeless tobacco: Never Used      Comment: Quit smoking: occ  - most days     Alcohol use No     Family History   Problem Relation Age of Onset     Family History Negative Daughter      Good Health     Family History Negative Daughter      Good Health     Family History Negative Daughter      Good Health     Prostate Cancer Father      Cancer-prostate     Hypertension Mother      Hypertension     DIABETES Maternal Grandmother      Diabetes     HEART DISEASE Paternal Grandfather 62     Heart Disease,MI      Coronary Artery Disease Paternal Grandfather 62     Coronary artery disease     Hypertension Maternal Aunt      Hypertension     DIABETES Maternal Aunt      Diabetes     Breast Cancer Maternal Aunt 70     Cancer-breast     Coronary Artery Disease Maternal Aunt 60     Coronary artery disease     Seizure Disorder Sister      Seizures     Thyroid Disease Sister      Thyroid Disease     Hypertension Maternal Uncle      Hypertension     DIABETES Maternal Uncle      Diabetes     Coronary Artery Disease Maternal Uncle 40     Coronary artery disease     DIABETES Paternal Aunt      Diabetes     DIABETES Paternal Uncle      Diabetes     Allergy (Severe) No family hx of      Allergies     Anesthesia Reaction No family hx of      Anesthesia Problem     Blood Disease No family hx of      Blood Disease     Colon Cancer No family hx of      Cancer-colon     Ovarian Cancer No family hx of      Cancer-ovarian     Other - See Comments No family hx of      Stroke         Current Outpatient Prescriptions   Medication Sig Dispense Refill     Cholecalciferol (D 5000) 5000 UNITS TABS Take 5,000 Units by mouth daily 30 tablet 2     omeprazole (PRILOSEC) 20 MG CR capsule Take 1 capsule (20 mg) by mouth daily 90 capsule 1     rosuvastatin (CRESTOR) 10 MG tablet Take 1 tablet (10 mg) by mouth daily 30 tablet 3     No Known Allergies  BP Readings from Last 3 Encounters:   18 124/82   10/24/17 152/86   10/24/17 132/80    Wt Readings from Last 3 Encounters:   18 188 lb 8 oz (85.5 kg)    10/24/17 190 lb 9 oz (86.4 kg)   10/16/17 192 lb (87.1 kg)                  Labs reviewed in EPIC    Review of Systems   Constitutional: Negative.    HENT: Negative.    Eyes: Negative.    Respiratory: Negative.    Cardiovascular: Negative.    Gastrointestinal: Positive for diarrhea.   Endocrine: Negative.    Genitourinary: Negative.    Musculoskeletal: Negative.    Skin: Negative.    Allergic/Immunologic: Negative.    Neurological: Negative.    Hematological: Negative.    Psychiatric/Behavioral: Negative.         OBJECTIVE:     /82 (BP Location: Right arm, Patient Position: Sitting, Cuff Size: Adult Regular)  Pulse 80  Wt 188 lb 8 oz (85.5 kg)  Breastfeeding? No  BMI 30.42 kg/m2  Body mass index is 30.42 kg/(m^2).  Physical Exam   Constitutional: She is oriented to person, place, and time. She appears well-developed and well-nourished.   Cardiovascular: Normal rate.    Pulmonary/Chest: Effort normal.   Musculoskeletal: Normal range of motion.   Neurological: She is alert and oriented to person, place, and time.   Skin: Skin is warm and dry.   Psychiatric: She has a normal mood and affect. Her behavior is normal. Judgment and thought content normal.   Nursing note and vitals reviewed.          ASSESSMENT/PLAN:     Chronic diarrhea    History of C. difficile diarrhea treated once    GERD  Tobacco use  Lipidemia    Plan: Refill medications and will need fasting lipid in 3 months for she runs out of medication  Referral to Dr. Vegas For follow-up on chronic diarrhea--- stool labs pending    Strongly encouraged tobacco cessation-declined      DAVID Bello [facility] AND [facility]        The 10-year ASCVD risk score (Melissatodd KEENAN Jr, et al., 2013) is: 7.1%    Values used to calculate the score:      Age: 47 years      Sex: Female      Is Non- : No      Diabetic: No      Tobacco smoker: Yes      Systolic Blood Pressure: 124 mmHg      Is BP treated: No      HDL Cholesterol:  34 mg/dL      Total Cholesterol: 231 mg/dL

## 2018-03-01 NOTE — OP NOTE
PROCEDURE NOTE    SURGEON:Ramona Vegas MD.    PRE-OP DIAGNOSIS:  Diagnostic Colonoscopy, diarrhea      POST-OP DIAGNOSIS: rectal polyps, diverticula of sigmoid colon    PROCEDURE:  Colonoscopy with biopsies    SPECIMEN:  Random colon biopsies, rectal polyps    ANESTHESIA:  Seeanesthesia note    ESTIMATED BLOOD LOSS: none    COMPLICATIONS:  None    INDICATION FOR THE PROCEDURE: The patient is a 47 year old female. The patient presents with diarrhea. I explained to the patient the risks, benefits and alternatives to diagnostic colonoscopy for evaluating her complaint. We specifically discussed the risks of bleeding, infection, perforation, potential inability to reach the cecum and the risks of sedation. The patient's questions were answered and the patient wished to proceed. Informed consent paperwork was completed.    PROCEDURE: The patient was taken to the endoscopy suite. Appropriate monitors were attached. The patient was placed in the left lateral decubitus position.Timeout was performed confirming the patient's identity and procedure to be performed. After appropriate sedation was confirmed, digital rectal examwas performed. There was normal tone and no gross abnormality was noted. The lubricated colonoscope was introduced into the anus the colon was insufflated with air. The prep quality was adequate. Under direct visualization the scope was advanced to the cecum. The ileocecal valve was intubated and the terminal ileum inspected. No gross abnormality was noted. The scope was withdrawn back into the cecum. The mucosa of the colon was inspected while withdrawing the scope. Random cold biopsies were obtained. In the rectum, a couple of small hyperplastic appearing polyps were noted and removed with cold forceps. The scope was retroflexed in the rectum and the anorectal junction was inspected. No abnormalities were noted. The scope was returned to a neutral position and the colon was decompressed. The scope was  removed. The patient tolerated the procedure with no immediately apparent complication. The patient was taken to recovery in stable condition.    FOLLOW UP:RECOMMEND high fiber diet, will call with pathology results.     Ramona Vegas

## 2018-03-01 NOTE — DISCHARGE INSTRUCTIONS
Procedure you had done: colonoscopy with biopsies  Your health care provider is:  Telma Garcia  Your surgeon is Dr. Ramona Vegas.   Please call your health care provider or surgeon at [phone] if:    - you feel you are getting worse or having an increase in problems    - fever greater than 101 degrees  - increasing shortness of breath or chest pain  - any signs of infection (increasing redness, swelling, tenderness, warmth, change in appearance, or  increased drainage)  - blood in your urine or stool  - coughing or vomiting blood  - nausea (upset stomach) and vomiting and/or diarrhea that will not stop  - severe pain that is not relieved by medicine, rest or ice  You have had medications for sedation. Please be aware that this can cause drowsiness and impaired judgment for up to 24 hours after your procedure. Do not drive, operate power tools or drink alcohol for 24 hours.  High fiber diet is recommended for colon health.  If samples were taken-you will get a phone call and a letter with your results in the next 7-10 days. If you don't get results, please call and let us know!

## 2018-03-01 NOTE — IP AVS SNAPSHOT
MRN:[id number]                      After Visit Summary   3/1/2018    Rose Mendez    MRN: [id number]           Thank you!     Thank you for choosing [facility] for your care. Our goal is always to provide you with excellent care. Hearing back from our patients is one way we can continue to improve our services. Please take a few minutes to complete the written survey that you may receive in the mail after you visit with us. Thank you!        Patient Information     Date Of Birth          1970        Designated Caregiver       Most Recent Value    Caregiver    Will someone help with your care after discharge? yes      About your hospital stay     You were admitted on:  March 1, 2018 You last received care in the:  [facility]    You were discharged on:  March 1, 2018       Who to Call     For medical emergencies, please call 911.  For non-urgent questions about your medical care, please call your primary care provider or clinic, [phone]  For questions related to your surgery, please call your surgery clinic        Attending Provider     Provider Specialty    Ramona Vegas MD Surgery       Primary Care Provider Office Phone # Fax #    DAVID Bello [phone]      Further instructions from your care team       Procedure you had done: colonoscopy with biopsies  Your health care provider is:  Telma Garcia  Your surgeon is Dr. Ramona Vegas.   Please call your health care provider or surgeon at [phone] if:    - you feel you are getting worse or having an increase in problems    - fever greater than 101 degrees  - increasing shortness of breath or chest pain  - any signs of infection (increasing redness, swelling, tenderness, warmth, change in appearance, or  increased drainage)  - blood in your urine or stool  - coughing or vomiting blood  - nausea (upset stomach) and vomiting and/or diarrhea that will not stop  - severe pain that is not  "relieved by medicine, rest or ice  You have had medications for sedation. Please be aware that this can cause drowsiness and impaired judgment for up to 24 hours after your procedure. Do not drive, operate power tools or drink alcohol for 24 hours.  High fiber diet is recommended for colon health.  If samples were taken-you will get a phone call and a letter with your results in the next 7-10 days. If you don't get results, please call and let us know!       Pending Results     No orders found from 2018 to 3/2/2018.            Admission Information     Date & Time Provider Department Dept. Phone    3/1/2018 Ramona Vegas MD [facility] [phone]      Your Vitals Were     Blood Pressure Pulse Temperature Respirations Pulse Oximetry       134/96 70 97.9  F (36.6  C) (Temporal) 18 96%       MyChart Information     [employer] lets you send messages to your doctor, view your test results, renew your prescriptions, schedule appointments and more. To sign up, go to [email or web address] . Click on \"Log in\" on the left side of the screen, which will take you to the Welcome page. Then click on \"Sign up Now\" on the right side of the page.     You will be asked to enter the access code listed below, as well as some personal information. Please follow the directions to create your username and password.     Your access code is: [email or web address]  Expires: 2018  9:21 AM     Your access code will  in 90 days. If you need help or a new code, please call your [facility] clinic or [phone].        Care EveryWhere ID     This is your Care EveryWhere ID. This could be used by other organizations to access your [facility] medical records  [id number]        Equal Access to Services     LUCILLE BOYER : Patricia Rahman, carlos rose, carli ramirez, geronimo charles. [facility] [place] -[phone].    ATENCIÓN: Si habla español, tiene a posada disposición servicios " celena de asistencia lingüística. Vivi beck [phone].    We comply with applicable federal civil rights laws and [place] laws. We do not discriminate on the basis of race, color, national origin, age, disability, sex, sexual orientation, or gender identity.               Review of your medicines      CONTINUE these medicines which have NOT CHANGED        Dose / Directions    Cholecalciferol 5000 UNITS Tabs   Commonly known as:  D 5000   Used for:  Vitamin D deficiency        Dose:  5000 Units   Take 5,000 Units by mouth daily   Quantity:  30 tablet   Refills:  2       omeprazole 20 MG CR capsule   Commonly known as:  priLOSEC   Used for:  Gastroesophageal reflux disease without esophagitis        Dose:  20 mg   Take 1 capsule (20 mg) by mouth daily   Quantity:  90 capsule   Refills:  1       rosuvastatin 10 MG tablet   Commonly known as:  CRESTOR   Used for:  Pure hypercholesterolemia        Dose:  10 mg   Take 1 tablet (10 mg) by mouth daily   Quantity:  30 tablet   Refills:  3                Protect others around you: Learn how to safely use, store and throw away your medicines at www.disposemymeds.org.             Medication List: This is a list of all your medications and when to take them. Check marks below indicate your daily home schedule. Keep this list as a reference.      Medications           Morning Afternoon Evening Bedtime As Needed    Cholecalciferol 5000 UNITS Tabs   Commonly known as:  D 5000   Take 5,000 Units by mouth daily                                omeprazole 20 MG CR capsule   Commonly known as:  priLOSEC   Take 1 capsule (20 mg) by mouth daily                                rosuvastatin 10 MG tablet   Commonly known as:  CRESTOR   Take 1 tablet (10 mg) by mouth daily

## 2018-03-01 NOTE — INTERVAL H&P NOTE
History and Physical Update    The history and physical has been reviewed and the patient has been examined.  There are no interim changes to the patient's history or physical condition.  I discussed diagnostic colonoscopy with the patient.

## 2018-03-01 NOTE — ANESTHESIA PREPROCEDURE EVALUATION
Anesthesia Evaluation     .             ROS/MED HX    ENT/Pulmonary:     (+)sleep apnea, tobacco use, Current use 1 packs/day  uses CPAP , . .    Neurologic:  - neg neurologic ROS     Cardiovascular:     (+) Dyslipidemia, ----. : . . . :. .       METS/Exercise Tolerance:     Hematologic:  - neg hematologic  ROS       Musculoskeletal:   (+) arthritis, , , -       GI/Hepatic:     (+) GERD Asymptomatic on medication, bowel prep,       Renal/Genitourinary:  - ROS Renal section negative       Endo:     (+) thyroid problem  Thyroid disease - Other thyroid nodules, .      Psychiatric:     (+) psychiatric history anxiety      Infectious Disease:  - neg infectious disease ROS       Malignancy:      - no malignancy   Other:    (+) No chance of pregnancy C-spine cleared: N/A, no H/O Chronic Pain,no other significant disability   - neg other ROS                 Physical Exam  Normal systems: cardiovascular and pulmonary    Airway   Mallampati: II  TM distance: >3 FB  Neck ROM: full    Dental   (+) upper dentures    Cardiovascular   Rhythm and rate: regular and normal      Pulmonary                     Anesthesia Plan      History & Physical Review  History and physical reviewed and following examination; no interval change.    ASA Status:  2 .    NPO Status:  > 8 hours    Plan for MAC          Postoperative Care      Consents  Anesthetic plan, risks, benefits and alternatives discussed with:  Patient..                          .

## 2018-03-01 NOTE — ANESTHESIA POSTPROCEDURE EVALUATION
Patient: Rose Mendez    Procedure(s):  Colonoscopy - Wound Class: III-Contaminated    Diagnosis:diarrhea, abdominal pain  Diagnosis Additional Information: No value filed.    Anesthesia Type:  MAC    Note:  Anesthesia Post Evaluation    Patient location during evaluation: Phase 2 and Endoscopy Recovery  Patient participation: Able to fully participate in evaluation  Level of consciousness: lethargic and awake  Pain management: adequate  Airway patency: patent  Cardiovascular status: acceptable  Respiratory status: acceptable  Hydration status: acceptable  PONV: none     Anesthetic complications: None          Last vitals:  Vitals:    03/01/18 1053   BP: 128/88   Pulse: 70   Resp: 18   Temp: 98.4  F (36.9  C)   SpO2: 94%         Electronically Signed By: DAVID SEVILLA CRNA  March 1, 2018  1:25 PM

## 2018-03-04 ENCOUNTER — APPOINTMENT (OUTPATIENT)
Dept: LAB | Facility: OTHER | Age: 48
End: 2018-03-04
Attending: INTERNAL MEDICINE
Payer: COMMERCIAL

## 2018-03-04 DIAGNOSIS — Z86.19 HISTORY OF CLOSTRIDIUM DIFFICILE INFECTION: ICD-10-CM

## 2018-03-04 DIAGNOSIS — K52.9 CHRONIC DIARRHEA: ICD-10-CM

## 2018-03-04 LAB
C DIFF TOX B STL QL: NEGATIVE
HEMOCCULT STL QL: NEGATIVE
SPECIMEN SOURCE: NORMAL

## 2018-03-04 PROCEDURE — 82272 OCCULT BLD FECES 1-3 TESTS: CPT | Performed by: NURSE PRACTITIONER

## 2018-03-04 PROCEDURE — 87493 C DIFF AMPLIFIED PROBE: CPT | Performed by: NURSE PRACTITIONER

## 2018-03-16 ENCOUNTER — OFFICE VISIT (OUTPATIENT)
Dept: SURGERY | Facility: OTHER | Age: 48
End: 2018-03-16
Attending: SURGERY
Payer: COMMERCIAL

## 2018-03-16 VITALS — WEIGHT: 190.6 LBS | BODY MASS INDEX: 30.76 KG/M2 | DIASTOLIC BLOOD PRESSURE: 82 MMHG | SYSTOLIC BLOOD PRESSURE: 130 MMHG

## 2018-03-16 DIAGNOSIS — R19.7 DIARRHEA, UNSPECIFIED TYPE: Primary | ICD-10-CM

## 2018-03-16 PROCEDURE — 99214 OFFICE O/P EST MOD 30 MIN: CPT | Performed by: SURGERY

## 2018-03-16 RX ORDER — L.ACIDOPH/B.ANIMALIS/B.LONGUM 15B CELL
1 CAPSULE ORAL DAILY
Qty: 30 CAPSULE | Refills: 3 | Status: SHIPPED | OUTPATIENT
Start: 2018-03-16 | End: 2019-08-14

## 2018-03-16 RX ORDER — CHOLESTYRAMINE 4 G/9G
4 POWDER, FOR SUSPENSION ORAL 2 TIMES DAILY WITH MEALS
Qty: 540 G | Refills: 3 | Status: SHIPPED | OUTPATIENT
Start: 2018-03-16 | End: 2018-07-27

## 2018-03-16 ASSESSMENT — PAIN SCALES - GENERAL: PAINLEVEL: NO PAIN (0)

## 2018-03-16 NOTE — MR AVS SNAPSHOT
After Visit Summary   3/16/2018    Rose Mendez    MRN: [id number]           Patient Information     Date Of Birth          1970        Visit Information        Provider Department      3/16/2018 8:30 AM Ramona Vegas MD [facility]        Today's Diagnoses     Diarrhea, unspecified type    -  1      Care Instructions    Call if you have questions. See me in 2-3 weeks if not improving. If your better-you don't have to come back.           Follow-ups after your visit        Follow-up notes from your care team     Return if symptoms worsen or fail to improve.      Who to contact     If you have questions or need follow up information about today's clinic visit or your schedule please contact [facility] directly at [phone].  Normal or non-critical lab and imaging results will be communicated to you by [employer], letter or phone within 4 business days after the clinic has received the results. If you do not hear from us within 7 days, please contact the clinic through [employer] or phone. If you have a critical or abnormal lab result, we will notify you by phone as soon as possible.  Submit refill requests through [employer] or call your pharmacy and they will forward the refill request to us. Please allow 3 business days for your refill to be completed.          Additional Information About Your Visit        MyChart Information     [employer] gives you secure access to your electronic health record. If you see a primary care provider, you can also send messages to your care team and make appointments. If you have questions, please call your primary care clinic.  If you do not have a primary care provider, please call [phone] and they will assist you.        Care EveryWhere ID     This is your Care EveryWhere ID. This could be used by other organizations to access your [place] medical records  [id number]        Your Vitals Were     BMI (Body Mass Index)                    30.76 kg/m2            Blood Pressure from Last 3 Encounters:   03/16/18 130/82   03/01/18 (!) 134/96   02/16/18 124/82    Weight from Last 3 Encounters:   03/16/18 190 lb 9.6 oz (86.5 kg)   02/16/18 188 lb 8 oz (85.5 kg)   10/24/17 190 lb 9 oz (86.4 kg)              Today, you had the following     No orders found for display         Today's Medication Changes          These changes are accurate as of 3/16/18 11:59 PM.  If you have any questions, ask your nurse or doctor.               Start taking these medicines.        Dose/Directions    cholestyramine 4 GM/DOSE powder   Commonly known as:  QUESTRAN   Used for:  Diarrhea, unspecified type   Started by:  Ramona Vegas MD        Dose:  4 g   Take 4 g by mouth 2 times daily (with meals)   Quantity:  540 g   Refills:  3       FLORAJEN3 Caps   Used for:  Diarrhea, unspecified type   Started by:  Ramona Vegas MD        Dose:  1 capsule   Take 1 capsule by mouth daily   Quantity:  30 capsule   Refills:  3       methylcellulose (laxative) Powd   Commonly known as:  CITRUCEL   Used for:  Diarrhea, unspecified type   Started by:  Ramona Vegas MD        Dose:  1 teaspoonful   Take 0.3 g (1.05 teaspoonful) by mouth 2 times daily   Quantity:  454 g   Refills:  3            Where to get your medicines      These medications were sent to [facility] [address] [place], [place] - [address]  [address], [facility] [address]     Phone:  [phone]     cholestyramine 4 GM/DOSE powder    FLORAJEN3 Caps    methylcellulose (laxative) Powd                Primary Care Provider Office Phone # Fax #    DAVID Bello [phone]       [address] [facility] [address]        Equal Access to Services     LUCILLE BOYER AH: Patricia Rahman, wawillamda luqadaha, qaybta kaalmada james, geronimo charles. [facility] [phone].    ATENCIÓN: Si habla español, tiene  a posada disposición servicios gratuitos de asistencia lingüística. Vivi beck [phone].    We comply with applicable federal civil rights laws and [place] laws. We do not discriminate on the basis of race, color, national origin, age, disability, sex, sexual orientation, or gender identity.            Thank you!     Thank you for choosing [facility] AND [facility]  for your care. Our goal is always to provide you with excellent care. Hearing back from our patients is one way we can continue to improve our services. Please take a few minutes to complete the written survey that you may receive in the mail after your visit with us. Thank you!             Your Updated Medication List - Protect others around you: Learn how to safely use, store and throw away your medicines at www.disposemymeds.org.          This list is accurate as of 3/16/18 11:59 PM.  Always use your most recent med list.                   Brand Name Dispense Instructions for use Diagnosis    Cholecalciferol 5000 UNITS Tabs    D 5000    30 tablet    Take 5,000 Units by mouth daily    Vitamin D deficiency       cholestyramine 4 GM/DOSE powder    QUESTRAN    540 g    Take 4 g by mouth 2 times daily (with meals)    Diarrhea, unspecified type       FLORAJEN3 Caps     30 capsule    Take 1 capsule by mouth daily    Diarrhea, unspecified type       methylcellulose (laxative) Powd    CITRUCEL    454 g    Take 0.3 g (1.05 teaspoonful) by mouth 2 times daily    Diarrhea, unspecified type       omeprazole 20 MG CR capsule    priLOSEC    90 capsule    Take 1 capsule (20 mg) by mouth daily    Gastroesophageal reflux disease without esophagitis       rosuvastatin 10 MG tablet    CRESTOR    30 tablet    Take 1 tablet (10 mg) by mouth daily    Pure hypercholesterolemia

## 2018-03-16 NOTE — PROGRESS NOTES
Primary Care Physician: Telma Garcia    HPI:   Patient is here for follow up. The patient is 47 year old female with diarrhea that has bothered her for months. She had c.diff and was treated but she continues to have diarrhea. No blood in stools. Some crampy pain at times but not all the time. She hasn't lost weight. She doesn't think there are any specific food triggers. She hasn't had fever. She recently had colonoscopy showing no ongoing colitis or infection.      ASSESSMENT AND PLAN/RECOMMENDATIONS:   Post infectious diarrhea- We discussed the suspected pathophysiology of post infectious diarrhea.  We discussed the use of probiotic daily, fiber supplements and the use of cholestyramine as a trial. She expressed understanding. She will start this regimen and see how she does. IF she has questions or concerns-she will call. If she isn't getting better-she will be seen in 2-3 weeks. She denies questions currently.    Past Medical History:   Diagnosis Date     Adverse effect of anesthetic     2005,Notes prior complications from anesthesia:  (2005) Nausea     Dependence on other enabling machines and devices     7/10/2015,Sleep studies and followup per Dr Alcantara 2015     Disturbance of skin sensation     left side, resolved. Normal MRI     Ganglion of wrist     9/3/2014     Injury of ulnar nerve of forearm     (left Dr Riggs     Nontoxic multinodular goiter     2015     Osteoarthritis of first carpometacarpal joint     2014     Other fracture of left lower leg, initial encounter for closed fracture     No Comments Provided     Personal history of other medical treatment (CODE)     20 c- sections     Vitamin D deficiency     2015      Past Surgical History:   Procedure Laterality Date     AS REVISE ULNAR NERVE AT WRIST Left 2005,94843.0,ID REVISE ULNAR NERVE AT WRIST,left, [place]      SECTION            SECTION      ,with postpartum tubal  sterilization Status post left elbow ulnar nerve transposition .     COLONOSCOPY N/A 3/1/2018    Procedure: COMBINED COLONOSCOPY, SINGLE OR MULTIPLE BIOPSY/POLYPECTOMY BY BIOPSY;  Colonoscopy;  Surgeon: Ramona Vegas MD;  Location: [facility] OR     HC ABLATION, ENDOMETRIAL, THERMAL, W/O HYSTEROSCOPIC GUIDANCE      5/4/10,796202,ABLATION,endometrial     HYSTERECTOMY VAGINAL      7/6/10,Laparoscopic Assisted Vaginal Hysterectomy     Family History   Problem Relation Age of Onset     Family History Negative Daughter      Good Health     Family History Negative Daughter      Good Health     Family History Negative Daughter      Good Health     Prostate Cancer Father      Cancer-prostate     Hypertension Mother      Hypertension     DIABETES Maternal Grandmother      Diabetes     HEART DISEASE Paternal Grandfather 62     Heart Disease,MI      Coronary Artery Disease Paternal Grandfather 62     Coronary artery disease     Hypertension Maternal Aunt      Hypertension     DIABETES Maternal Aunt      Diabetes     Breast Cancer Maternal Aunt 70     Cancer-breast     Coronary Artery Disease Maternal Aunt 60     Coronary artery disease     Seizure Disorder Sister      Seizures     Thyroid Disease Sister      Thyroid Disease     Hypertension Maternal Uncle      Hypertension     DIABETES Maternal Uncle      Diabetes     Coronary Artery Disease Maternal Uncle 40     Coronary artery disease     DIABETES Paternal Aunt      Diabetes     DIABETES Paternal Uncle      Diabetes     Allergy (Severe) No family hx of      Allergies     Anesthesia Reaction No family hx of      Anesthesia Problem     Blood Disease No family hx of      Blood Disease     Colon Cancer No family hx of      Cancer-colon     Ovarian Cancer No family hx of      Cancer-ovarian     Other - See Comments No family hx of      Stroke     Social History     Social History     Marital status: Single     Spouse name: N/A     Number of children: N/A     Years of education:  N/A     Social History Main Topics     Smoking status: Current Every Day Smoker     Packs/day: 1.00     Years: 30.00     Types: Cigarettes     Smokeless tobacco: Never Used      Comment: Quit smoking: occ - most days     Alcohol use No     Drug use: No      Comment: Drug use: No     Sexual activity: Yes     Partners: Male     Birth control/ protection: Surgical     Other Topics Concern     None     Social History Narrative     2000.  Raising her 3 children.  Ex-, Osman, sees daughters once a month.    Martina    Child, date of birth 05/12/95  Katrina    Child, date of birth 04/04/97  Shannon    Child, date of birth 08/31/98  Patient currently smokes.     Current Outpatient Prescriptions   Medication     Cholecalciferol (D 5000) 5000 UNITS TABS     omeprazole (PRILOSEC) 20 MG CR capsule     rosuvastatin (CRESTOR) 10 MG tablet     No current facility-administered medications for this visit.      No Known Allergies  REVIEW OF SYSTEMS  GENERAL: No fevers orchills. Denies fatigue, recent weight loss.  HEENT: No sinus drainage. No changes with vision or hearing. No difficulty swallowing.   LYMPHATICS:  No swollen nodes in axilla, neck or groin.  CARDIOVASCULAR: Denieschest pain, palpitations and dyspnea on exertion.  PULMONARY: No shortness of breath or cough. No increase in sputum production.  GI: Denies melena, bright red blood in stools. No hematemesis. No constipation.  : No dysuria or hematuria.  SKIN: No recent rashes or ulcers.   HEMATOLOGY:  No history of easy bruising or bleeding.  ENDOCRINE:  No history of diabetes or thyroid problems.  NEUROLOGY:  Nohistory of seizures or headaches. No motor or sensory changes.    PHYSICAL EXAM  Vitals: /82 (BP Location: Right arm, Patient Position: Sitting, Cuff Size: Adult Large)  Wt 190 lb 9.6 oz (86.5 kg)  BMI 30.76 kg/m2  GENERAL: Healthy appearing patient in no acute distress. Pleasant and cooperative with exam and interview.    HEENT:Head-normocephalic. Eyes-no scleral icterus, pupils equal, round, and reactive to light. Nose-no nasal drainage. No lesions. Mouth-oral mucosa pink and moist, no lesions.  NECK: Supple. No thyroid nodules. Tracheamidline.  LYMPHATICS:  No cervical, axillary or supraclavicular adenopathy..  ABDOMEN: Non distended. Bowel sounds active. Soft, non-tender, no hepatosplenomegaly or hernias. No peritoneal signs.  SKIN: Pink, warm and dry. No jaundice. No rash.  NEURO:  Cranial nerves II-XII grossly intact.Alert and oriented.  PSYCH: Appropriate mood and affect.    IMAGING/LAB  I personally reviewed patient's colonoscopy pictures and pathology results with the patient.

## 2018-03-16 NOTE — PATIENT INSTRUCTIONS
Call if you have questions. See me in 2-3 weeks if not improving. If your better-you don't have to come back.

## 2018-07-23 NOTE — PROGRESS NOTES
Patient Information     Patient Name  Rose Mendez MRN  [id number] Sex  Female   1970      Letter by Liyl Becerra NP at      Author:  Lily Becerra NP Service:  (none) Author Type:  (none)    Filed:   Encounter Date:  2017 Status:  (Other)             Work/School Excuse and Restrictions                    Discharged:                                                              10:22 AM  2017        Rose Mendez  was seen today at [facility] for illness.    Please excuse Rose from work today, 17 due to illness.    Rose is able to return to work with no restrictions on 1/10/17.                **If you have questions regarding the validity of this note, please call the number above.          LILY BECERRA NP ....................  2017   10:23 AM

## 2018-07-24 ENCOUNTER — MYC MEDICAL ADVICE (OUTPATIENT)
Dept: FAMILY MEDICINE | Facility: OTHER | Age: 48
End: 2018-07-24

## 2018-07-24 NOTE — PROGRESS NOTES
Patient Information     Patient Name  Rose Mendez MRN  [id number] Sex  Female   1970      Letter by Telma Garcia NP at      Author:  Telam Garcia NP Service:  (none) Author Type:  (none)    Filed:   Encounter Date:  10/5/2017 Status:  (Other)           Rose Mendez  [address] [facility] [address]          2017    Dear Ms. Mendez:    Your recent swallow and esophagus study results showed no swallowing abnormalities, no narrowing or strictures and a small hiatal hernia which is not concerning at this time.  Please follow up in the clinic with me regarding this issue for further evaluation.    If you have any further questions or problems contact my office at  [phone].    Thank you,    Telma Garcai NP ....................  10/5/2017   1:48 PM

## 2018-07-27 ENCOUNTER — OFFICE VISIT (OUTPATIENT)
Dept: FAMILY MEDICINE | Facility: OTHER | Age: 48
End: 2018-07-27
Attending: PHYSICAL MEDICINE & REHABILITATION
Payer: COMMERCIAL

## 2018-07-27 VITALS
BODY MASS INDEX: 31.31 KG/M2 | DIASTOLIC BLOOD PRESSURE: 80 MMHG | HEART RATE: 64 BPM | WEIGHT: 194 LBS | SYSTOLIC BLOOD PRESSURE: 138 MMHG

## 2018-07-27 DIAGNOSIS — E78.00 PURE HYPERCHOLESTEROLEMIA: ICD-10-CM

## 2018-07-27 DIAGNOSIS — F17.200 TOBACCO DEPENDENCE: ICD-10-CM

## 2018-07-27 DIAGNOSIS — R51.9 HEADACHE, WORSENING: ICD-10-CM

## 2018-07-27 DIAGNOSIS — R68.89 FORGETFULNESS: ICD-10-CM

## 2018-07-27 DIAGNOSIS — R41.840 ATTENTION AND CONCENTRATION DEFICIT: ICD-10-CM

## 2018-07-27 DIAGNOSIS — R41.3 MEMORY CHANGES: Primary | ICD-10-CM

## 2018-07-27 DIAGNOSIS — E78.2 MIXED HYPERLIPIDEMIA: ICD-10-CM

## 2018-07-27 LAB
ALBUMIN SERPL-MCNC: 4 G/DL (ref 3.5–5.7)
ALP SERPL-CCNC: 84 U/L (ref 34–104)
ALT SERPL W P-5'-P-CCNC: 21 U/L (ref 7–52)
ANION GAP SERPL CALCULATED.3IONS-SCNC: 7 MMOL/L (ref 3–14)
AST SERPL W P-5'-P-CCNC: 21 U/L (ref 13–39)
BILIRUB SERPL-MCNC: 0.6 MG/DL (ref 0.3–1)
BUN SERPL-MCNC: 11 MG/DL (ref 7–25)
CALCIUM SERPL-MCNC: 9.4 MG/DL (ref 8.6–10.3)
CHLORIDE SERPL-SCNC: 106 MMOL/L (ref 98–107)
CHOLEST SERPL-MCNC: 170 MG/DL
CO2 SERPL-SCNC: 24 MMOL/L (ref 21–31)
CREAT SERPL-MCNC: 0.74 MG/DL (ref 0.6–1.2)
GFR SERPL CREATININE-BSD FRML MDRD: 83 ML/MIN/1.7M2
GLUCOSE SERPL-MCNC: 94 MG/DL (ref 70–105)
HBA1C MFR BLD: 5.9 % (ref 4–6)
HDLC SERPL-MCNC: 33 MG/DL (ref 23–92)
LDLC SERPL CALC-MCNC: 105 MG/DL
NONHDLC SERPL-MCNC: 137 MG/DL
POTASSIUM SERPL-SCNC: 4.2 MMOL/L (ref 3.5–5.1)
PROT SERPL-MCNC: 7.2 G/DL (ref 6.4–8.9)
SODIUM SERPL-SCNC: 137 MMOL/L (ref 134–144)
TRIGL SERPL-MCNC: 162 MG/DL
TSH SERPL DL<=0.05 MIU/L-ACNC: 2.65 IU/ML (ref 0.34–5.6)

## 2018-07-27 PROCEDURE — 80061 LIPID PANEL: CPT | Performed by: NURSE PRACTITIONER

## 2018-07-27 PROCEDURE — 99214 OFFICE O/P EST MOD 30 MIN: CPT | Performed by: NURSE PRACTITIONER

## 2018-07-27 PROCEDURE — 36415 COLL VENOUS BLD VENIPUNCTURE: CPT | Performed by: NURSE PRACTITIONER

## 2018-07-27 PROCEDURE — 80053 COMPREHEN METABOLIC PANEL: CPT | Performed by: NURSE PRACTITIONER

## 2018-07-27 PROCEDURE — 83036 HEMOGLOBIN GLYCOSYLATED A1C: CPT | Performed by: NURSE PRACTITIONER

## 2018-07-27 PROCEDURE — 84443 ASSAY THYROID STIM HORMONE: CPT | Performed by: NURSE PRACTITIONER

## 2018-07-27 RX ORDER — ROSUVASTATIN CALCIUM 10 MG/1
10 TABLET, COATED ORAL DAILY
Qty: 90 TABLET | Refills: 3 | Status: SHIPPED | OUTPATIENT
Start: 2018-07-27 | End: 2019-10-17

## 2018-07-27 ASSESSMENT — ANXIETY QUESTIONNAIRES
7. FEELING AFRAID AS IF SOMETHING AWFUL MIGHT HAPPEN: NOT AT ALL
2. NOT BEING ABLE TO STOP OR CONTROL WORRYING: MORE THAN HALF THE DAYS
5. BEING SO RESTLESS THAT IT IS HARD TO SIT STILL: SEVERAL DAYS
GAD7 TOTAL SCORE: 8
1. FEELING NERVOUS, ANXIOUS, OR ON EDGE: SEVERAL DAYS
3. WORRYING TOO MUCH ABOUT DIFFERENT THINGS: MORE THAN HALF THE DAYS
IF YOU CHECKED OFF ANY PROBLEMS ON THIS QUESTIONNAIRE, HOW DIFFICULT HAVE THESE PROBLEMS MADE IT FOR YOU TO DO YOUR WORK, TAKE CARE OF THINGS AT HOME, OR GET ALONG WITH OTHER PEOPLE: VERY DIFFICULT
6. BECOMING EASILY ANNOYED OR IRRITABLE: NOT AT ALL

## 2018-07-27 ASSESSMENT — PAIN SCALES - GENERAL: PAINLEVEL: NO PAIN (0)

## 2018-07-27 ASSESSMENT — PATIENT HEALTH QUESTIONNAIRE - PHQ9: 5. POOR APPETITE OR OVEREATING: MORE THAN HALF THE DAYS

## 2018-07-27 NOTE — NURSING NOTE
Patient presents for labs/ Cholesterol and Medication Management   Nohemi Suresh LPN........................7/27/2018  9:50 AM

## 2018-07-27 NOTE — MR AVS SNAPSHOT
After Visit Summary   7/27/2018    Rose Mendez    MRN: [id number]           Patient Information     Date Of Birth          1970        Visit Information        Provider Department      7/27/2018 9:45 AM Telma Garcia APRN CNP [facility]        Today's Diagnoses     Memory changes    -  1    Pure hypercholesterolemia        Forgetfulness        Tobacco dependence        Headache, worsening        Mixed hyperlipidemia          Care Instructions    Call to set up eval with psychologist for attention, cognitive     You will get a call to set up MRI    I will release results to LinkedIn    Let me know if you want to see dermatology for body check for family history          Follow-ups after your visit        Follow-up notes from your care team     Return if symptoms worsen or fail to improve.      Future tests that were ordered for you today     Open Future Orders        Priority Expected Expires Ordered    TSH Routine  7/27/2019 7/27/2018    Hemoglobin A1c Routine  7/27/2019 7/27/2018    Comprehensive Metabolic Panel Routine  7/27/2019 7/27/2018    Lipid Panel Routine  7/27/2019 7/27/2018    MR Brain w/o Contrast Routine  7/27/2019 7/27/2018            Who to contact     If you have questions or need follow up information about today's clinic visit or your schedule please contact [facility] [place] directly at [phone].  Normal or non-critical lab and imaging results will be communicated to you by Munetrixhart, letter or phone within 4 business days after the clinic has received the results. If you do not hear from us within 7 days, please contact the clinic through [email or web address] or phone. If you have a critical or abnormal lab result, we will notify you by phone as soon as possible.  Submit refill requests through GiftLauncher or call your pharmacy and they will forward the refill request to us. Please allow 3 business days for your refill to be completed.           Additional Information About Your Visit        MyChart Information     [employer] gives you secure access to your electronic health record. If you see a primary care provider, you can also send messages to your care team and make appointments. If you have questions, please call your primary care clinic.  If you do not have a primary care provider, please call [phone] and they will assist you.        Care EveryWhere ID     This is your Care EveryWhere ID. This could be used by other organizations to access your [facility] medical records  [id number]        Your Vitals Were     Pulse Breastfeeding? BMI (Body Mass Index)             64 No 31.31 kg/m2          Blood Pressure from Last 3 Encounters:   07/27/18 138/80   03/16/18 130/82   03/01/18 (!) 134/96    Weight from Last 3 Encounters:   07/27/18 194 lb (88 kg)   03/16/18 190 lb 9.6 oz (86.5 kg)   02/16/18 188 lb 8 oz (85.5 kg)                 Where to get your medicines      These medications were sent to [id number] - [place], [place] - [address]  [address], [facility] [address]     Phone:  [phone]     rosuvastatin 10 MG tablet          Primary Care Provider Office Phone # Fax #    Telma DAMON DAVID Garcia [phone]       [address] [facility] [address]        Equal Access to Services     [facility]: Hadii aad ku hadasho Soomaali, waaxda luqadaha, qaybta kaalmada adeegyada, geronimo paula . So [facility] [phone].    ATENCIÓN: Si habla español, tiene a posada disposición servicios gratuitos de asistencia lingüística. Llame al [phone].    We comply with applicable federal civil rights laws and [place] laws. We do not discriminate on the basis of race, color, national origin, age, disability, sex, sexual orientation, or gender identity.            Thank you!     Thank you for choosing [facility] [place]  for your care. Our goal is  always to provide you with excellent care. Hearing back from our patients is one way we can continue to improve our services. Please take a few minutes to complete the written survey that you may receive in the mail after your visit with us. Thank you!             Your Updated Medication List - Protect others around you: Learn how to safely use, store and throw away your medicines at www.disposemymeds.org.          This list is accurate as of 7/27/18 10:38 AM.  Always use your most recent med list.                   Brand Name Dispense Instructions for use Diagnosis    Cholecalciferol 5000 units Tabs    D 5000    30 tablet    Take 5,000 Units by mouth daily    Vitamin D deficiency       FLORAJEN3 Caps     30 capsule    Take 1 capsule by mouth daily    Diarrhea, unspecified type       omeprazole 20 MG CR capsule    priLOSEC    90 capsule    Take 1 capsule (20 mg) by mouth daily    Gastroesophageal reflux disease without esophagitis       rosuvastatin 10 MG tablet    CRESTOR    90 tablet    Take 1 tablet (10 mg) by mouth daily    Pure hypercholesterolemia

## 2018-07-27 NOTE — PATIENT INSTRUCTIONS
Call to set up eval with psychologist for attention, cognitive     You will get a call to set up MRI    I will release results to [facility]    Let me know if you want to see dermatology for body check for family history

## 2018-07-28 ASSESSMENT — ANXIETY QUESTIONNAIRES: GAD7 TOTAL SCORE: 8

## 2018-07-29 ASSESSMENT — ENCOUNTER SYMPTOMS
DECREASED CONCENTRATION: 1
HEADACHES: 1

## 2018-07-29 NOTE — PROGRESS NOTES
SUBJECTIVE:   Rose Mendez is a 48 year old female who presents to clinic today for the following health issues:    Presents for medication follow-up on statin therapy for lipidemia, reports she has been taking the medication daily as directed continues to smoke tobacco.  Is not ready to quit uses this for stress relief  Has concerns regarding memory and attention, family members have commented on her forgetfulness  This is not really affected her ADLs--she is concerned that she may have early dementia  Reports she had attention deficit issues growing up and in high school however was never formally diagnosed or treated  Other family members have attention deficit disorder  Has had ongoing issues with headaches however she feels headaches have become more frequent however they are still intermittent but persistent    HPI    Patient Active Problem List   Diagnosis     Anxiety state     CMC arthritis, thumb, degenerative     Ganglion cyst of wrist     History of hysterectomy     Multiple thyroid nodules     MERCEDES on CPAP     Snoring     Tobacco abuse     Tobacco dependence     Vitamin D deficiency     Past Surgical History:   Procedure Laterality Date     AS REVISE ULNAR NERVE AT WRIST Left 2005,25098.0,OK REVISE ULNAR NERVE AT WRIST,left, [facility]      SECTION            SECTION      ,with postpartum tubal sterilization Status post left elbow ulnar nerve transposition .     COLONOSCOPY N/A 3/1/2018    Procedure: COMBINED COLONOSCOPY, SINGLE OR MULTIPLE BIOPSY/POLYPECTOMY BY BIOPSY;  Colonoscopy;  Surgeon: Ramona Vegas MD;  Location:  OR      ABLATION, ENDOMETRIAL, THERMAL, W/O HYSTEROSCOPIC GUIDANCE      5/4/10,676199,ABLATION,endometrial     HYSTERECTOMY VAGINAL      7/6/10,Laparoscopic Assisted Vaginal Hysterectomy       Social History   Substance Use Topics     Smoking status: Current Every Day Smoker     Packs/day: 1.00     Years: 30.00     Types: Cigarettes      Smokeless tobacco: Never Used      Comment: Quit smoking: occ - most days     Alcohol use No     Family History   Problem Relation Age of Onset     Family History Negative Daughter      Good Health     Family History Negative Daughter      Good Health     Family History Negative Daughter      Good Health     Prostate Cancer Father      Cancer-prostate     Seizure Disorder Father      head injury     Hypertension Mother      Hypertension     Diabetes Maternal Grandmother      Diabetes     HEART DISEASE Paternal Grandfather 62     Heart Disease,MI      Coronary Artery Disease Paternal Grandfather 62     Coronary artery disease     Hypertension Maternal Aunt      Hypertension     Diabetes Maternal Aunt      Diabetes     Breast Cancer Maternal Aunt 70     Cancer-breast     Coronary Artery Disease Maternal Aunt 60     Coronary artery disease     Seizure Disorder Sister      congenital hypoxia     Thyroid Disease Sister      Thyroid Disease     Hypertension Maternal Uncle      Hypertension     Diabetes Maternal Uncle      Diabetes     Coronary Artery Disease Maternal Uncle 40     Coronary artery disease     Diabetes Paternal Aunt      Diabetes     Diabetes Paternal Uncle      Diabetes     Allergy (Severe) No family hx of      Allergies     Anesthesia Reaction No family hx of      Anesthesia Problem     Blood Disease No family hx of      Blood Disease     Colon Cancer No family hx of      Cancer-colon     Ovarian Cancer No family hx of      Cancer-ovarian     Other - See Comments No family hx of      Stroke         Current Outpatient Prescriptions   Medication Sig Dispense Refill     Cholecalciferol (D 5000) 5000 UNITS TABS Take 5,000 Units by mouth daily 30 tablet 2     omeprazole (PRILOSEC) 20 MG CR capsule Take 1 capsule (20 mg) by mouth daily 90 capsule 1     Probiotic Product (FLORAJEN3) CAPS Take 1 capsule by mouth daily 30 capsule 3     rosuvastatin (CRESTOR) 10 MG tablet Take 1 tablet (10 mg) by mouth daily 90  tablet 3     No Known Allergies  Recent Labs   Lab Test  07/27/18   1042  10/16/17   1536  09/20/17   1103   08/05/16   0838   A1C  5.9   --    --    --    --    LDL  105*   --   152*   --   137*   HDL  33   --   34   --   31   TRIG  162*   --   224*   --   226*   ALT  21   --    --    --    --    CR  0.74  0.84   --    < >   --    GFRESTIMATED  83   --    --    --    --    GFRESTBLACK  >90  >60   --    < >   --    POTASSIUM  4.2  4.5   --    < >   --     < > = values in this interval not displayed.      BP Readings from Last 3 Encounters:   07/27/18 138/80   03/16/18 130/82   03/01/18 (!) 134/96    Wt Readings from Last 3 Encounters:   07/27/18 194 lb (88 kg)   03/16/18 190 lb 9.6 oz (86.5 kg)   02/16/18 188 lb 8 oz (85.5 kg)                  Labs reviewed in EPIC    Review of Systems   Neurological: Positive for headaches.   Psychiatric/Behavioral: Positive for decreased concentration.   All other systems reviewed and are negative.       OBJECTIVE:     /80 (BP Location: Right arm, Patient Position: Sitting, Cuff Size: Adult Large)  Pulse 64  Wt 194 lb (88 kg)  Breastfeeding? No  BMI 31.31 kg/m2  Body mass index is 31.31 kg/(m^2).  Physical Exam   Constitutional: She is oriented to person, place, and time. She appears well-developed and well-nourished.   HENT:   Head: Normocephalic and atraumatic.   Mouth/Throat: Oropharynx is clear and moist.   Eyes: Conjunctivae and EOM are normal. Pupils are equal, round, and reactive to light. Right eye exhibits no discharge. Left eye exhibits no discharge. No scleral icterus.   Neck: Normal range of motion. Neck supple.   Cardiovascular: Normal rate and regular rhythm.    Pulmonary/Chest: Effort normal.   Musculoskeletal: Normal range of motion.   Lymphadenopathy:     She has no cervical adenopathy.   Neurological: She is alert and oriented to person, place, and time. She has normal reflexes. She displays normal reflexes. No cranial nerve deficit. She exhibits normal  muscle tone. Coordination normal.   Skin: Skin is warm and dry.   Psychiatric: She has a normal mood and affect. Her behavior is normal. Judgment and thought content normal.   Nursing note and vitals reviewed.      Results for orders placed or performed in visit on 07/27/18   Lipid Panel   Result Value Ref Range    Cholesterol 170 <200 mg/dL    Triglycerides 162 (H) <150 mg/dL    HDL Cholesterol 33 23 - 92 mg/dL    LDL Cholesterol Calculated 105 (H) <100 mg/dL    Non HDL Cholesterol 137 (H) <130 mg/dL   TSH   Result Value Ref Range    Thyrotropin 2.65 0.34 - 5.60 IU/mL   Hemoglobin A1c   Result Value Ref Range    Hemoglobin A1C 5.9 4.0 - 6.0 %   Comprehensive Metabolic Panel   Result Value Ref Range    Sodium 137 134 - 144 mmol/L    Potassium 4.2 3.5 - 5.1 mmol/L    Chloride 106 98 - 107 mmol/L    Carbon Dioxide 24 21 - 31 mmol/L    Anion Gap 7 3 - 14 mmol/L    Glucose 94 70 - 105 mg/dL    Urea Nitrogen 11 7 - 25 mg/dL    Creatinine 0.74 0.60 - 1.20 mg/dL    GFR Estimate 83 >60 mL/min/1.7m2    GFR Estimate If Black >90 >60 mL/min/1.7m2    Calcium 9.4 8.6 - 10.3 mg/dL    Bilirubin Total 0.6 0.3 - 1.0 mg/dL    Albumin 4.0 3.5 - 5.7 g/dL    Protein Total 7.2 6.4 - 8.9 g/dL    Alkaline Phosphatase 84 34 - 104 U/L    ALT 21 7 - 52 U/L    AST 21 13 - 39 U/L         ASSESSMENT/PLAN:     No neuro red flags or abnormalities on exam  Patient verbally interactive, alert, appropriate, aware of occasional forgetfulness  I suspect attention deficit disorder--she has compensated  for years    With her report of increased frequency of headaches will obtain MRI imaging    Would like her to see psychologist for attention and cognitive testing--she has a recommendation from her friend and will independently schedule this appointment  I did discuss neurological consultation--she declines and would like to obtain psychological cognitive testing first    She declines tobacco cessation    We will release results via my chart as  requested    Due for screening mammography in September  Status post hysterectomy      1. Pure hypercholesterolemia    - rosuvastatin (CRESTOR) 10 MG tablet; Take 1 tablet (10 mg) by mouth daily  Dispense: 90 tablet; Refill: 3  - Lipid Panel; Future  - Lipid Panel    2. Forgetfulness    - Lipid Panel; Future  - Lipid Panel  - MR Brain w/o & w Contrast; Future    3. Tobacco dependence    - Lipid Panel; Future  - Lipid Panel    4. Memory changes    - Lipid Panel; Future  - TSH; Future  - Hemoglobin A1c; Future  - Comprehensive Metabolic Panel; Future  - Lipid Panel  - TSH  - Hemoglobin A1c  - Comprehensive Metabolic Panel  - MR Brain w/o & w Contrast; Future    5. Headache, worsening    - Lipid Panel; Future  - Lipid Panel  - MR Brain w/o & w Contrast; Future    6. Mixed hyperlipidemia      7. Attention and concentration deficit          DAVID Bello [facility]

## 2018-08-01 ENCOUNTER — MYC MEDICAL ADVICE (OUTPATIENT)
Dept: FAMILY MEDICINE | Facility: OTHER | Age: 48
End: 2018-08-01

## 2018-08-01 ENCOUNTER — HOSPITAL ENCOUNTER (OUTPATIENT)
Dept: MRI IMAGING | Facility: OTHER | Age: 48
Discharge: HOME | End: 2018-08-01
Attending: NURSE PRACTITIONER | Admitting: NURSE PRACTITIONER
Payer: COMMERCIAL

## 2018-08-01 DIAGNOSIS — R41.3 MEMORY CHANGES: ICD-10-CM

## 2018-08-01 DIAGNOSIS — R68.89 FORGETFULNESS: ICD-10-CM

## 2018-08-01 DIAGNOSIS — R51.9 HEADACHE, WORSENING: ICD-10-CM

## 2018-08-01 PROCEDURE — 25500064 ZZH RX 255 OP 636: Performed by: NURSE PRACTITIONER

## 2018-08-01 PROCEDURE — A9575 INJ GADOTERATE MEGLUMI 0.1ML: HCPCS | Performed by: NURSE PRACTITIONER

## 2018-08-01 PROCEDURE — 70553 MRI BRAIN STEM W/O & W/DYE: CPT

## 2018-08-01 RX ORDER — GADOTERATE MEGLUMINE 376.9 MG/ML
20 INJECTION INTRAVENOUS ONCE
Status: COMPLETED | OUTPATIENT
Start: 2018-08-01 | End: 2018-08-01

## 2018-08-01 RX ADMIN — GADOTERATE MEGLUMINE 17 ML: 376.9 INJECTION INTRAVENOUS at 07:21

## 2018-08-02 NOTE — TELEPHONE ENCOUNTER
This was documented in the result note. I pasted it below.     Filomena Nava LPN on 8/2/2018 at 9:13 AM      Notes Recorded by Eric Villalobos LPN on 8/2/2018 at 8:47 AM  After birth date was verified, spoke with patient and let her know the below information. No further questions or concerns at this time.        Eric Villalobos LPN 08/02/18 8:47 AM

## 2018-08-17 DIAGNOSIS — E78.00 PURE HYPERCHOLESTEROLEMIA: ICD-10-CM

## 2018-08-20 RX ORDER — ROSUVASTATIN CALCIUM 10 MG/1
TABLET, COATED ORAL
Qty: 30 TABLET | Refills: 0 | OUTPATIENT
Start: 2018-08-20

## 2018-08-20 NOTE — TELEPHONE ENCOUNTER
Redundant refill request refused: Too soon.    rosuvastatin (CRESTOR) 10 MG tablet 90 tablet 3 7/27/2018  No   Sig - Route: Take 1 tablet (10 mg) by mouth daily - Oral   Class: E-Prescribe   Order: [id number]   E-Prescribing Status: Receipt confirmed by pharmacy (7/27/2018 10:27 AM CDT)     [facility] - [place], [place] - [address]     Unable to complete prescription refill per RN Medication Refill Policy. Adeola Perez RN .............. 8/20/2018  4:24 PM

## 2018-10-30 ENCOUNTER — TRANSFERRED RECORDS (OUTPATIENT)
Dept: HEALTH INFORMATION MANAGEMENT | Facility: OTHER | Age: 48
End: 2018-10-30

## 2018-11-02 ENCOUNTER — HOSPITAL ENCOUNTER (OUTPATIENT)
Dept: MRI IMAGING | Facility: OTHER | Age: 48
Discharge: HOME | End: 2018-11-02
Attending: NURSE PRACTITIONER | Admitting: FAMILY MEDICINE
Payer: COMMERCIAL

## 2018-11-02 DIAGNOSIS — M25.521 RIGHT ELBOW PAIN: ICD-10-CM

## 2018-11-02 PROCEDURE — 73221 MRI JOINT UPR EXTREM W/O DYE: CPT | Mod: RT

## 2018-11-12 ENCOUNTER — TRANSFERRED RECORDS (OUTPATIENT)
Dept: HEALTH INFORMATION MANAGEMENT | Facility: OTHER | Age: 48
End: 2018-11-12

## 2018-11-21 ENCOUNTER — OFFICE VISIT (OUTPATIENT)
Dept: FAMILY MEDICINE | Facility: OTHER | Age: 48
End: 2018-11-21
Attending: NURSE PRACTITIONER
Payer: COMMERCIAL

## 2018-11-21 VITALS
HEART RATE: 80 BPM | HEIGHT: 66 IN | TEMPERATURE: 97.8 F | DIASTOLIC BLOOD PRESSURE: 78 MMHG | BODY MASS INDEX: 31.34 KG/M2 | WEIGHT: 195 LBS | RESPIRATION RATE: 16 BRPM | SYSTOLIC BLOOD PRESSURE: 136 MMHG

## 2018-11-21 DIAGNOSIS — S59.901D INJURY OF RIGHT ELBOW, SUBSEQUENT ENCOUNTER: Primary | ICD-10-CM

## 2018-11-21 DIAGNOSIS — F43.23 ADJUSTMENT DISORDER WITH MIXED ANXIETY AND DEPRESSED MOOD: ICD-10-CM

## 2018-11-21 DIAGNOSIS — F90.2 ADHD (ATTENTION DEFICIT HYPERACTIVITY DISORDER), COMBINED TYPE: ICD-10-CM

## 2018-11-21 DIAGNOSIS — F90.9 ATTENTION DEFICIT HYPERACTIVITY DISORDER (ADHD), UNSPECIFIED ADHD TYPE: ICD-10-CM

## 2018-11-21 DIAGNOSIS — F41.1 GAD (GENERALIZED ANXIETY DISORDER): ICD-10-CM

## 2018-11-21 PROCEDURE — 99214 OFFICE O/P EST MOD 30 MIN: CPT | Performed by: NURSE PRACTITIONER

## 2018-11-21 RX ORDER — IBUPROFEN 200 MG
400 TABLET ORAL EVERY 6 HOURS PRN
COMMUNITY
End: 2019-08-14

## 2018-11-21 ASSESSMENT — ANXIETY QUESTIONNAIRES
3. WORRYING TOO MUCH ABOUT DIFFERENT THINGS: NEARLY EVERY DAY
6. BECOMING EASILY ANNOYED OR IRRITABLE: NEARLY EVERY DAY
IF YOU CHECKED OFF ANY PROBLEMS ON THIS QUESTIONNAIRE, HOW DIFFICULT HAVE THESE PROBLEMS MADE IT FOR YOU TO DO YOUR WORK, TAKE CARE OF THINGS AT HOME, OR GET ALONG WITH OTHER PEOPLE: SOMEWHAT DIFFICULT
GAD7 TOTAL SCORE: 11
7. FEELING AFRAID AS IF SOMETHING AWFUL MIGHT HAPPEN: SEVERAL DAYS
2. NOT BEING ABLE TO STOP OR CONTROL WORRYING: SEVERAL DAYS
1. FEELING NERVOUS, ANXIOUS, OR ON EDGE: SEVERAL DAYS
5. BEING SO RESTLESS THAT IT IS HARD TO SIT STILL: SEVERAL DAYS

## 2018-11-21 ASSESSMENT — PATIENT HEALTH QUESTIONNAIRE - PHQ9: 5. POOR APPETITE OR OVEREATING: SEVERAL DAYS

## 2018-11-21 ASSESSMENT — PAIN SCALES - GENERAL: PAINLEVEL: MODERATE PAIN (4)

## 2018-11-21 NOTE — PROGRESS NOTES
"There are no exam notes on file for this visit.  Nursing note reviewed with patient.  Accurracy and completeness verified.   Ms. Mendez is a 48 year old female who:  Patient presents with:  RECHECK: follow up on tests      ICD-10-CM    1. Injury of right elbow, subsequent encounter S59.901D ORTHOPEDICS ADULT REFERRAL   2. Attention deficit hyperactivity disorder (ADHD), unspecified ADHD type F90.9 MENTAL HEALTH REFERRAL  - Adult; Psychiatry and Medication Management; Psychiatry; Other: Not Listed - Enter Referral Details in Scheduling Comments Below; Patient call to schedule     CANCELED: MENTAL HEALTH REFERRAL  - Adult; Psychiatry and Medication Management; Psychiatry; Other: Not Listed - Enter Referral Details in Scheduling Comments Below; Patient call to schedule   3. YOLANDA (generalized anxiety disorder) F41.1 MENTAL HEALTH REFERRAL  - Adult; Psychiatry and Medication Management; Psychiatry; Other: Not Listed - Enter Referral Details in Scheduling Comments Below; Patient call to schedule     CANCELED: MENTAL HEALTH REFERRAL  - Adult; Psychiatry and Medication Management; Psychiatry; Other: Not Listed - Enter Referral Details in Scheduling Comments Below; Patient call to schedule     HPI  Presents for follow-up on 2 issues    She reports \"tore\" the tendon in her right elbow--was told it is in \" shreds\" and was suggested there was  no orthopedic intervention needed she should get an over-the-counter brace or compression sleeve  She is wondering where to purchase these things--continues to have pain--it is tolerable--she is right-hand dominant--  She is interested in a second opinion--[facility] and would like consultation      Other issue is is that she was having some possible \"memory\" issues--had extensive psychological testing done and final diagnosis ADHD combined type and anxiety--she brought in a copy of the diagnostic assessment completed at [facility]  By Lena " Livan RUSHING  She would like to try medications for both      Review of Systems   Musculoskeletal: Positive for arthralgias.   Psychiatric/Behavioral: Positive for decreased concentration. The patient is nervous/anxious.    All other systems reviewed and are negative.     All other systems reviewed and negative.     YOLANDA:   YOLANDA-7 SCORE 2/10/2016 7/27/2018 11/21/2018   Total Score 0 8 11     PHQ9:  PHQ-9 SCORE 2/10/2016 10/16/2017   Total Score 4 0       I have personally reviewed the past medical history, past surgical history, medications, allergies, family and social history as listed below, on 11/21/2018.    No Known Allergies    Current Outpatient Prescriptions   Medication Sig Dispense Refill     Cholecalciferol (D 5000) 5000 UNITS TABS Take 5,000 Units by mouth daily 30 tablet 2     ibuprofen (ADVIL/MOTRIN) 200 MG tablet Take 400 mg by mouth every 6 hours as needed       omeprazole (PRILOSEC) 20 MG CR capsule Take 1 capsule (20 mg) by mouth daily 90 capsule 1     Probiotic Product (FLORAJEN3) CAPS Take 1 capsule by mouth daily 30 capsule 3     rosuvastatin (CRESTOR) 10 MG tablet Take 1 tablet (10 mg) by mouth daily 90 tablet 3        Patient Active Problem List    Diagnosis Date Noted     Anxiety state 02/16/2018     Priority: Medium     Overview:   with mood disorder       Tobacco abuse 02/16/2018     Priority: Medium     Tobacco dependence 02/10/2016     Priority: Medium     Status post finger joint fusion 12/15/2015     Priority: Medium     MERCEDES on CPAP 07/10/2015     Priority: Medium     Overview:   Sleep studies and followup per Dr Alcantara 7/2015       History of hysterectomy 02/25/2015     Priority: Medium     Vitamin D deficiency 02/13/2015     Priority: Medium     Snoring 02/12/2015     Priority: Medium     Multiple thyroid nodules 01/19/2015     Priority: Medium     Ganglion cyst of wrist 09/03/2014     Priority: Medium     CMC arthritis, thumb, degenerative 08/27/2014     Priority: Medium     Past Medical  History:   Diagnosis Date     Adverse effect of anesthetic     2005,Notes prior complications from anesthesia:  (2005) Nausea     Dependence on other enabling machines and devices     7/10/2015,Sleep studies and followup per Dr Alcantara 2015     Disturbance of skin sensation     left side, resolved. Normal MRI     Ganglion of wrist     9/3/2014     Injury of ulnar nerve of forearm     (left Dr Keely     Nontoxic multinodular goiter     2015     Osteoarthritis of first carpometacarpal joint     2014     Other fracture of left lower leg, initial encounter for closed fracture     No Comments Provided     Personal history of other medical treatment (CODE)     20 c- sections     Vitamin D deficiency     2015     Past Surgical History:   Procedure Laterality Date     AS REVISE ULNAR NERVE AT WRIST Left 2005,57422.0,[place] REVISE ULNAR NERVE AT WRIST,left, [place]      SECTION            SECTION      ,with postpartum tubal sterilization Status post left elbow ulnar nerve transposition .     COLONOSCOPY N/A 3/1/2018    Procedure: COMBINED COLONOSCOPY, SINGLE OR MULTIPLE BIOPSY/POLYPECTOMY BY BIOPSY;  Colonoscopy;  Surgeon: Ramona Vegas MD;  Location: [facility] OR     HC ABLATION, ENDOMETRIAL, THERMAL, W/O HYSTEROSCOPIC GUIDANCE      5/4/10,890459,ABLATION,endometrial     HYSTERECTOMY VAGINAL      7/6/10,Laparoscopic Assisted Vaginal Hysterectomy     Social History     Social History     Marital status: Single     Spouse name: N/A     Number of children: N/A     Years of education: N/A     Social History Main Topics     Smoking status: Current Every Day Smoker     Packs/day: 1.00     Years: 30.00     Types: Cigarettes     Smokeless tobacco: Never Used      Comment: Quit smoking: occ - most days     Alcohol use No     Drug use: No      Comment: Drug use: No     Sexual activity: Yes     Partners: Male     Birth control/ protection: Surgical     Other Topics Concern      None     Social History Narrative     .  Raising her 3 children.  Ex-, Osman, sees daughters once a month.    Martina    Child, date of birth 95  Katrina    Child, date of birth 97  Shannon    Child, date of birth 98  Patient currently smokes.     Family History   Problem Relation Age of Onset     Family History Negative Daughter      Good Health     Family History Negative Daughter      Good Health     Family History Negative Daughter      Good Health     Prostate Cancer Father      Cancer-prostate     Seizure Disorder Father      head injury     Hypertension Mother      Hypertension     Diabetes Maternal Grandmother      Diabetes     HEART DISEASE Paternal Grandfather 62     Heart Disease,MI      Coronary Artery Disease Paternal Grandfather 62     Coronary artery disease     Hypertension Maternal Aunt      Hypertension     Diabetes Maternal Aunt      Diabetes     Breast Cancer Maternal Aunt 70     Cancer-breast     Coronary Artery Disease Maternal Aunt 60     Coronary artery disease     Seizure Disorder Sister      congenital hypoxia     Thyroid Disease Sister      Thyroid Disease     Hypertension Maternal Uncle      Hypertension     Diabetes Maternal Uncle      Diabetes     Coronary Artery Disease Maternal Uncle 40     Coronary artery disease     Diabetes Paternal Aunt      Diabetes     Diabetes Paternal Uncle      Diabetes     Allergy (Severe) No family hx of      Allergies     Anesthesia Reaction No family hx of      Anesthesia Problem     Blood Disease No family hx of      Blood Disease     Colon Cancer No family hx of      Cancer-colon     Ovarian Cancer No family hx of      Cancer-ovarian     Other - See Comments No family hx of      Stroke       EXAM:   Vitals:    18 0945   BP: 136/78   BP Location: Right arm   Patient Position: Sitting   Cuff Size: Adult Regular   Pulse: 80   Resp: 16   Temp: 97.8  F (36.6  C)   TempSrc: Oral   Weight: 195 lb (88.5 kg)   Height:  "5' 6\" (1.676 m)       Current Pain Score: Moderate Pain (4)     BP Readings from Last 3 Encounters:   11/21/18 136/78   07/27/18 138/80   03/16/18 130/82      Wt Readings from Last 3 Encounters:   11/21/18 195 lb (88.5 kg)   07/27/18 194 lb (88 kg)   03/16/18 190 lb 9.6 oz (86.5 kg)      Estimated body mass index is 31.47 kg/(m^2) as calculated from the following:    Height as of this encounter: 5' 6\" (1.676 m).    Weight as of this encounter: 195 lb (88.5 kg).     Physical Exam   Constitutional: She appears well-developed and well-nourished.   Cardiovascular: Normal rate.    Pulmonary/Chest: Effort normal.   Musculoskeletal: Normal range of motion. She exhibits tenderness.   Right elbow area without erythema warmth  Generalized tenderness both epicondylar areas  Range of motion sensation and circulation intact   Skin: Skin is warm and dry.   Psychiatric: She has a normal mood and affect. Her behavior is normal. Judgment and thought content normal.   Nursing note and vitals reviewed.       INVESTIGATIONS:  Results for orders placed or performed during the hospital encounter of 11/02/18   MR Elbow Right w/o Contrast    Narrative    EXAM:MR ELBOW RIGHT W/O CONTRAST     CLINICAL HISTORY: Patient Age  48 years Additional clinical info: ;  Right elbow pain    COMPARISON: None      TECHNIQUE: Multiplanar MRI images acquired  without gadolinium IV  contrast.    FINDINGS: Slight thickening and increased T2 signal of the common  extensor tendon where it attaches on the lateral humeral epicondyles  with a tiny focus of fluid signal measuring approximately 4 mm x 4 mm  x 1 mm in craniocaudad, AP, transverse dimensions in the undersurface  of the tendon. This is consistent with partial tearing of the common  extensor tendon in the setting of chronic tendinopathy and lateral  epicondylitis. The lateral collateral ligament appears intact. The  common flexor tendon and medial collateral ligament appear intact.  Physiologic amount " of fluid in the elbow joint. No loose body  identified. Biceps tendon and triceps tendon appear normal. Ulnar  nerve appears normal. No muscle atrophy nor muscle edema. No abnormal  mass or fluid           Impression    IMPRESSION:   1. Low-grade partial tearing of the common extensor tendon at its  attachment on the lateral humeral condyle in the setting of chronic  low-grade lateral epicondylitis.    TOYIN HAMILTON MD       ASSESSMENT AND PLAN:  Problem List Items Addressed This Visit     None      Visit Diagnoses     Injury of right elbow, subsequent encounter    -  Primary    Relevant Orders    ORTHOPEDICS ADULT REFERRAL    Attention deficit hyperactivity disorder (ADHD), unspecified ADHD type        Relevant Orders    MENTAL HEALTH REFERRAL  - Adult; Psychiatry and Medication Management; Psychiatry; Other: Not Listed - Enter Referral Details in Scheduling Comments Below; Patient call to schedule    YOLANDA (generalized anxiety disorder)        Relevant Orders    MENTAL HEALTH REFERRAL  - Adult; Psychiatry and Medication Management; Psychiatry; Other: Not Listed - Enter Referral Details in Scheduling Comments Below; Patient call to schedule        Referral sent for Dr Puentes to review MRI and consultation and recommendations on any therapy or treatment indicated right elbow  He could certainly use gentle Ace wrap mild compression as tolerated intermittent ice would avoid any stress or strain    Discussed medication management of ADHD and anxiety--- complex particularly if a stimulant is used  Would like her to see psychiatric medication manager for consultation----if she is well managed and stable happy to comanage medications  She will see Munira Rose--- she signed a release for direct communication and coordination of care with Munira Rose and [facility] counseling    Declines flu vaccine    -- Expected clinical course discussed    -- Medications and their side effects discussed    There are no Patient  Instructions on file for this visit.  Telma Garcia [facility]     Portions of this note were dictated using speech recognition software. The note has been proofread but errors in the text may have been overlooked. Please contact me if there are any concerns regarding the accuracy of the dictation.

## 2018-11-21 NOTE — MR AVS SNAPSHOT
After Visit Summary   11/21/2018    Rose Mendez    MRN: [id number]           Patient Information     Date Of Birth          1970        Visit Information        Provider Department      11/21/2018 9:30 AM Telma Garcia APRN CNP [facility]        Today's Diagnoses     Injury of right elbow, subsequent encounter    -  1    Attention deficit hyperactivity disorder (ADHD), unspecified ADHD type        YOLANDA (generalized anxiety disorder)        ADHD (attention deficit hyperactivity disorder), combined type        Adjustment disorder with mixed anxiety and depressed mood           Follow-ups after your visit        Additional Services     MENTAL HEALTH REFERRAL  - Adult; Psychiatry and Medication Management; Psychiatry; Other: Not Listed - Enter Referral Details in Scheduling Comments Below; Patient call to schedule       Munira Rose--[facility] counseling  Patient will bring diagnostic assessment report to [facility] for psychiatric review            ORTHOPEDICS ADULT REFERRAL       Dr Puentes  [facility] hand surgeon  consult followup on right extensor tendon injury--second opinion on management    Was seen at [facility]  Had MRI --please send to Dr Puentes                  Follow-up notes from your care team     Return if symptoms worsen or fail to improve.      Who to contact     If you have questions or need follow up information about today's clinic visit or your schedule please contact [facility] directly at [phone].  Normal or non-critical lab and imaging results will be communicated to you by MyChart, letter or phone within 4 business days after the clinic has received the results. If you do not hear from us within 7 days, please contact the clinic through MyChart or phone. If you have a critical or abnormal lab result, we will notify you by phone as soon as possible.  Submit refill requests through [employer] or call  "your pharmacy and they will forward the refill request to us. Please allow 3 business days for your refill to be completed.          Additional Information About Your Visit        ChartCubehart Information     [employer] gives you secure access to your electronic health record. If you see a primary care provider, you can also send messages to your care team and make appointments. If you have questions, please call your primary care clinic.  If you do not have a primary care provider, please call [phone] and they will assist you.        Care EveryWhere ID     This is your Care EveryWhere ID. This could be used by other organizations to access your [facility] medical records  [id number]        Your Vitals Were     Pulse Temperature Respirations Height BMI (Body Mass Index)       80 97.8  F (36.6  C) (Oral) 16 5' 6\" (1.676 m) 31.47 kg/m2        Blood Pressure from Last 3 Encounters:   11/21/18 136/78   07/27/18 138/80   03/16/18 130/82    Weight from Last 3 Encounters:   11/21/18 195 lb (88.5 kg)   07/27/18 194 lb (88 kg)   03/16/18 190 lb 9.6 oz (86.5 kg)              We Performed the Following     MENTAL HEALTH REFERRAL  - Adult; Psychiatry and Medication Management; Psychiatry; Other: Not Listed - Enter Referral Details in Scheduling Comments Below; Patient call to schedule     ORTHOPEDICS ADULT REFERRAL        Primary Care Provider Office Phone # Fax #    Telmakarthik Garcia, APRN -[phone]       [address] [facility] [address]        Equal Access to Services     LUCILLE BOYER : Hadii aad ku hadasho Soomaali, waaxda luqadaha, qaybta kaalmada adeegyada, geronimo paula . So [facility] [phone].    ATENCIÓN: Si habla leslyañol, tiene a posada disposición servicios gratuitos de asistencia lingüística. Llame al [phone].    We comply with applicable federal civil rights laws and [place] laws. We do not discriminate on the basis of race, color, national origin, age, " disability, sex, sexual orientation, or gender identity.            Thank you!     Thank you for choosing [facility]  for your care. Our goal is always to provide you with excellent care. Hearing back from our patients is one way we can continue to improve our services. Please take a few minutes to complete the written survey that you may receive in the mail after your visit with us. Thank you!             Your Updated Medication List - Protect others around you: Learn how to safely use, store and throw away your medicines at www.disposemymeds.org.          This list is accurate as of 11/21/18 11:59 PM.  Always use your most recent med list.                   Brand Name Dispense Instructions for use Diagnosis    Cholecalciferol 5000 units Tabs    D 5000    30 tablet    Take 5,000 Units by mouth daily    Vitamin D deficiency       FLORAJEN3 Caps     30 capsule    Take 1 capsule by mouth daily    Diarrhea, unspecified type       ibuprofen 200 MG tablet    ADVIL/MOTRIN     Take 400 mg by mouth every 6 hours as needed        omeprazole 20 MG CR capsule    priLOSEC    90 capsule    Take 1 capsule (20 mg) by mouth daily    Gastroesophageal reflux disease without esophagitis       rosuvastatin 10 MG tablet    CRESTOR    90 tablet    Take 1 tablet (10 mg) by mouth daily    Pure hypercholesterolemia

## 2018-11-22 ASSESSMENT — ANXIETY QUESTIONNAIRES: GAD7 TOTAL SCORE: 11

## 2018-11-25 PROBLEM — F90.2 ADHD (ATTENTION DEFICIT HYPERACTIVITY DISORDER), COMBINED TYPE: Status: ACTIVE | Noted: 2018-11-25

## 2018-11-25 PROBLEM — F43.23 ADJUSTMENT DISORDER WITH MIXED ANXIETY AND DEPRESSED MOOD: Status: ACTIVE | Noted: 2018-02-16

## 2018-11-25 ASSESSMENT — ENCOUNTER SYMPTOMS
DECREASED CONCENTRATION: 1
NERVOUS/ANXIOUS: 1
ARTHRALGIAS: 1

## 2018-12-05 ENCOUNTER — TRANSFERRED RECORDS (OUTPATIENT)
Dept: HEALTH INFORMATION MANAGEMENT | Facility: OTHER | Age: 48
End: 2018-12-05

## 2019-08-09 ENCOUNTER — HOSPITAL ENCOUNTER (OUTPATIENT)
Dept: MAMMOGRAPHY | Facility: OTHER | Age: 49
Discharge: HOME | End: 2019-08-09
Attending: NURSE PRACTITIONER | Admitting: NURSE PRACTITIONER
Payer: COMMERCIAL

## 2019-08-09 DIAGNOSIS — Z12.39 BREAST SCREENING, UNSPECIFIED: ICD-10-CM

## 2019-08-09 PROCEDURE — 77063 BREAST TOMOSYNTHESIS BI: CPT

## 2019-08-14 ENCOUNTER — OFFICE VISIT (OUTPATIENT)
Dept: FAMILY MEDICINE | Facility: OTHER | Age: 49
End: 2019-08-14
Attending: NURSE PRACTITIONER
Payer: COMMERCIAL

## 2019-08-14 VITALS
SYSTOLIC BLOOD PRESSURE: 122 MMHG | OXYGEN SATURATION: 98 % | WEIGHT: 169 LBS | RESPIRATION RATE: 18 BRPM | HEIGHT: 66 IN | DIASTOLIC BLOOD PRESSURE: 80 MMHG | TEMPERATURE: 97.1 F | BODY MASS INDEX: 27.16 KG/M2 | HEART RATE: 63 BPM

## 2019-08-14 DIAGNOSIS — Z72.0 TOBACCO USE: ICD-10-CM

## 2019-08-14 DIAGNOSIS — E78.2 MIXED HYPERLIPIDEMIA: Primary | ICD-10-CM

## 2019-08-14 DIAGNOSIS — Z83.3 FAMILY HISTORY OF DIABETES MELLITUS: ICD-10-CM

## 2019-08-14 DIAGNOSIS — Z76.0 ENCOUNTER FOR MEDICATION REFILL: ICD-10-CM

## 2019-08-14 DIAGNOSIS — Z90.710 HISTORY OF HYSTERECTOMY: ICD-10-CM

## 2019-08-14 LAB
ALBUMIN SERPL-MCNC: 4.4 G/DL (ref 3.5–5.7)
ALP SERPL-CCNC: 84 U/L (ref 34–104)
ALT SERPL W P-5'-P-CCNC: 16 U/L (ref 7–52)
ANION GAP SERPL CALCULATED.3IONS-SCNC: 5 MMOL/L (ref 3–14)
AST SERPL W P-5'-P-CCNC: 19 U/L (ref 13–39)
BILIRUB SERPL-MCNC: 0.5 MG/DL (ref 0.3–1)
BUN SERPL-MCNC: 11 MG/DL (ref 7–25)
CALCIUM SERPL-MCNC: 9.6 MG/DL (ref 8.6–10.3)
CHLORIDE SERPL-SCNC: 106 MMOL/L (ref 98–107)
CHOLEST SERPL-MCNC: 131 MG/DL
CO2 SERPL-SCNC: 26 MMOL/L (ref 21–31)
CREAT SERPL-MCNC: 0.85 MG/DL (ref 0.6–1.2)
GFR SERPL CREATININE-BSD FRML MDRD: 71 ML/MIN/{1.73_M2}
GLUCOSE SERPL-MCNC: 95 MG/DL (ref 70–105)
HBA1C MFR BLD: 5.7 % (ref 4–6)
HDLC SERPL-MCNC: 36 MG/DL (ref 23–92)
LDLC SERPL CALC-MCNC: 67 MG/DL
NONHDLC SERPL-MCNC: 95 MG/DL
POTASSIUM SERPL-SCNC: 4.4 MMOL/L (ref 3.5–5.1)
PROT SERPL-MCNC: 7.2 G/DL (ref 6.4–8.9)
SODIUM SERPL-SCNC: 137 MMOL/L (ref 134–144)
TRIGL SERPL-MCNC: 140 MG/DL

## 2019-08-14 PROCEDURE — 80053 COMPREHEN METABOLIC PANEL: CPT | Mod: ZL | Performed by: NURSE PRACTITIONER

## 2019-08-14 PROCEDURE — 83036 HEMOGLOBIN GLYCOSYLATED A1C: CPT | Mod: ZL | Performed by: NURSE PRACTITIONER

## 2019-08-14 PROCEDURE — 36415 COLL VENOUS BLD VENIPUNCTURE: CPT | Mod: ZL | Performed by: NURSE PRACTITIONER

## 2019-08-14 PROCEDURE — 80061 LIPID PANEL: CPT | Mod: ZL | Performed by: NURSE PRACTITIONER

## 2019-08-14 PROCEDURE — 99214 OFFICE O/P EST MOD 30 MIN: CPT | Performed by: NURSE PRACTITIONER

## 2019-08-14 RX ORDER — DEXTROAMPHETAMINE SACCHARATE, AMPHETAMINE ASPARTATE, DEXTROAMPHETAMINE SULFATE AND AMPHETAMINE SULFATE 1.25; 1.25; 1.25; 1.25 MG/1; MG/1; MG/1; MG/1
1 TABLET ORAL DAILY
COMMUNITY
Start: 2019-08-12

## 2019-08-14 RX ORDER — DEXTROAMPHETAMINE SACCHARATE, AMPHETAMINE ASPARTATE MONOHYDRATE, DEXTROAMPHETAMINE SULFATE AND AMPHETAMINE SULFATE 5; 5; 5; 5 MG/1; MG/1; MG/1; MG/1
1 CAPSULE, EXTENDED RELEASE ORAL EVERY MORNING
COMMUNITY
Start: 2019-08-12

## 2019-08-14 ASSESSMENT — PAIN SCALES - GENERAL: PAINLEVEL: NO PAIN (0)

## 2019-08-14 ASSESSMENT — MIFFLIN-ST. JEOR: SCORE: 1403.33

## 2019-08-14 ASSESSMENT — PATIENT HEALTH QUESTIONNAIRE - PHQ9: SUM OF ALL RESPONSES TO PHQ QUESTIONS 1-9: 2

## 2019-08-14 NOTE — PROGRESS NOTES
Nursing Notes:   Tati Albarado LPN  8/14/2019  9:15 AM  Signed  Patient presents to clinic today for a physical. Patient wants to complete his labs.     No LMP recorded. Patient has had a hysterectomy.  Medication Reconciliation: complete    Tati Albarado LPN  8/14/2019 9:03 AM    Nursing note reviewed with patient.  Accurracy and completeness verified.   Ms. Mendez is a 49 year old female who:  Patient presents with:  Physical      ICD-10-CM    1. Family history of diabetes mellitus Z83.3 Lipid Panel     Comprehensive Metabolic Panel     Hemoglobin A1c     Hemoglobin A1c     Lipid Panel     Comprehensive Metabolic Panel   2. History of hysterectomy Z90.710    3. Mixed hyperlipidemia E78.2    4. Tobacco use Z72.0    5. 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   All other systems reviewed and are negative.     All other systems reviewed and negative.     YOLANDA:   YOLANDA-7 SCORE 2/10/2016 7/27/2018 11/21/2018   Total Score 0 8 11     PHQ9:  PHQ-9 SCORE 2/10/2016 10/16/2017 8/14/2019   PHQ-9 Total Score 4 0 2       I have personally reviewed the past medical history, past surgical history, medications, allergies, family and social history as listed below, on 8/14/2019.    No Known  Allergies    Current Outpatient Medications   Medication Sig Dispense Refill     amphetamine-dextroamphetamine (ADDERALL XR) 20 MG 24 hr capsule Take 1 capsule by mouth every morning       amphetamine-dextroamphetamine (ADDERALL) 5 MG tablet Take 1 tablet by mouth daily Every early afternoon       Cholecalciferol (D 5000) 5000 UNITS TABS Take 5,000 Units by mouth daily 30 tablet 2     FLUoxetine (PROZAC) 20 MG capsule Take 1 capsule by mouth At Bedtime       rosuvastatin (CRESTOR) 10 MG tablet Take 1 tablet (10 mg) by mouth daily 90 tablet 3        Patient Active Problem List    Diagnosis Date Noted     ADHD (attention deficit hyperactivity disorder), combined type 11/25/2018     Priority: Medium     Diagnostic assessment completed by Lena Ram LP at [facility] 10/2018--see scans       Adjustment disorder with mixed anxiety and depressed mood 02/16/2018     Priority: Medium     Diagnostic assessment final impression--[facility] L[facility]--10/2018       Tobacco abuse 02/16/2018     Priority: Medium     Tobacco dependence 02/10/2016     Priority: Medium     Status post finger joint fusion 12/15/2015     Priority: Medium     MERCEDES on CPAP 07/10/2015     Priority: Medium     Overview:   Sleep studies and followup per Dr Alcantara 7/2015       History of hysterectomy 02/25/2015     Priority: Medium     Vitamin D deficiency 02/13/2015     Priority: Medium     Snoring 02/12/2015     Priority: Medium     Multiple thyroid nodules 01/19/2015     Priority: Medium     Ganglion cyst of wrist 09/03/2014     Priority: Medium     CMC arthritis, thumb, degenerative 08/27/2014     Priority: Medium     Past Medical History:   Diagnosis Date     ADHD (attention deficit hyperactivity disorder), combined type 11/25/2018    Diagnostic assessment completed by Lena Ram LP at [facility] 10/2018--see scans     Adjustment  disorder with mixed anxiety and depressed mood 2018    Overview:  with mood disorder     Adverse effect of anesthetic     2005,Notes prior complications from anesthesia:  (2005) Nausea     Dependence on other enabling machines and devices     7/10/2015,Sleep studies and followup per Dr Alcantara 2015     Disturbance of skin sensation     left side, resolved. Normal MRI     Ganglion of wrist     9/3/2014     Injury of ulnar nerve of forearm     (left Dr Riggs     Nontoxic multinodular goiter     2015     Osteoarthritis of first carpometacarpal joint     2014     Other fracture of left lower leg, initial encounter for closed fracture     No Comments Provided     Personal history of other medical treatment (CODE)     20 c- sections     Vitamin D deficiency     2015     Past Surgical History:   Procedure Laterality Date     AS REVISE ULNAR NERVE AT WRIST Left 2005,37251.0,VA REVISE ULNAR NERVE AT WRIST,left, Riggs      SECTION            SECTION      ,with postpartum tubal sterilization Status post left elbow ulnar nerve transposition .     COLONOSCOPY N/A 3/1/2018    Procedure: COMBINED COLONOSCOPY, SINGLE OR MULTIPLE BIOPSY/POLYPECTOMY BY BIOPSY;  Colonoscopy;  Surgeon: Ramona Vegas MD;  Location: [facility] OR     HC ABLATION, ENDOMETRIAL, THERMAL, W/O HYSTEROSCOPIC GUIDANCE      5/4/10,187168,ABLATION,endometrial     HYSTERECTOMY VAGINAL      7/6/10,Laparoscopic Assisted Vaginal Hysterectomy     Social History     Socioeconomic History     Marital status: Single     Spouse name: None     Number of children: None     Years of education: None     Highest education level: None   Occupational History     None   Social Needs     Financial resource strain: None     Food insecurity:     Worry: None     Inability: None     Transportation needs:     Medical: None     Non-medical: None   Tobacco Use     Smoking status: Current Every Day Smoker     Packs/day:  0.50     Years: 30.00     Pack years: 15.00     Types: Cigarettes     Smokeless tobacco: Never Used     Tobacco comment: Quit smoking: occ - most days   Substance and Sexual Activity     Alcohol use: No     Alcohol/week: 0.0 oz     Drug use: No     Comment: Drug use: No     Sexual activity: Yes     Partners: Male     Birth control/protection: Surgical   Lifestyle     Physical activity:     Days per week: None     Minutes per session: None     Stress: None   Relationships     Social connections:     Talks on phone: None     Gets together: None     Attends Congregation service: None     Active member of club or organization: None     Attends meetings of clubs or organizations: None     Relationship status: None     Intimate partner violence:     Fear of current or ex partner: None     Emotionally abused: None     Physically abused: None     Forced sexual activity: None   Other Topics Concern     Parent/sibling w/ CABG, MI or angioplasty before 65F 55M? Not Asked   Social History Narrative     .  Raising her 3 children.  Ex-, Osman, sees daughters once a month.    Martina    Child, date of birth 95  Katrina    Child, date of birth 97  Shannon    Child, date of birth 98  Patient currently smokes.     Family History   Problem Relation Age of Onset     Family History Negative Daughter         Good Health     Family History Negative Daughter         Good Health     Family History Negative Daughter         Good Health     Prostate Cancer Father         Cancer-prostate     Seizure Disorder Father         head injury     Hypertension Mother         Hypertension     Diabetes Maternal Grandmother         Diabetes     Heart Disease Paternal Grandfather 62        Heart Disease,MI      Coronary Artery Disease Paternal Grandfather 62        Coronary artery disease     Hypertension Maternal Aunt         Hypertension     Diabetes Maternal Aunt         Diabetes     Breast Cancer Maternal Aunt 70      "   Cancer-breast     Coronary Artery Disease Maternal Aunt 60        Coronary artery disease     Seizure Disorder Sister         congenital hypoxia     Thyroid Disease Sister         Thyroid Disease     Hypertension Maternal Uncle         Hypertension     Diabetes Maternal Uncle         Diabetes     Coronary Artery Disease Maternal Uncle 40        Coronary artery disease     Diabetes Paternal Aunt         Diabetes     Diabetes Paternal Uncle         Diabetes     Allergy (Severe) No family hx of         Allergies     Anesthesia Reaction No family hx of         Anesthesia Problem     Blood Disease No family hx of         Blood Disease     Colon Cancer No family hx of         Cancer-colon     Ovarian Cancer No family hx of         Cancer-ovarian     Other - See Comments No family hx of         Stroke       EXAM:   Vitals:    08/14/19 0859   BP: 122/80   BP Location: Right arm   Patient Position: Sitting   Cuff Size: Adult Regular   Pulse: 63   Resp: 18   Temp: 97.1  F (36.2  C)   TempSrc: Temporal   SpO2: 98%   Weight: 76.7 kg (169 lb)   Height: 1.676 m (5' 6\")       Current Pain Score: No Pain (0)     BP Readings from Last 3 Encounters:   08/14/19 122/80   11/21/18 136/78   07/27/18 138/80      Wt Readings from Last 3 Encounters:   08/14/19 76.7 kg (169 lb)   11/21/18 88.5 kg (195 lb)   07/27/18 88 kg (194 lb)      Estimated body mass index is 27.28 kg/m  as calculated from the following:    Height as of this encounter: 1.676 m (5' 6\").    Weight as of this encounter: 76.7 kg (169 lb).     Physical Exam   Constitutional: She is oriented to person, place, and time. She appears well-developed and well-nourished.   Cardiovascular: Normal rate and regular rhythm.   Pulmonary/Chest: Effort normal.   Musculoskeletal: Normal range of motion.   Neurological: She is alert and oriented to person, place, and time.   Skin: Skin is warm and dry.   Skin is generally dry and scaly  Dark suntanned overall   Psychiatric: She has a " normal mood and affect. Her behavior is normal. Judgment and thought content normal.   Nursing note and vitals reviewed.      INVESTIGATIONS:  Results for orders placed or performed in visit on 08/14/19   Hemoglobin A1c   Result Value Ref Range    Hemoglobin A1C 5.7 4.0 - 6.0 %   Lipid Panel   Result Value Ref Range    Cholesterol 131 <200 mg/dL    Triglycerides 140 <150 mg/dL    HDL Cholesterol 36 23 - 92 mg/dL    LDL Cholesterol Calculated 67 <100 mg/dL    Non HDL Cholesterol 95 <130 mg/dL   Comprehensive Metabolic Panel   Result Value Ref Range    Sodium 137 134 - 144 mmol/L    Potassium 4.4 3.5 - 5.1 mmol/L    Chloride 106 98 - 107 mmol/L    Carbon Dioxide 26 21 - 31 mmol/L    Anion Gap 5 3 - 14 mmol/L    Glucose 95 70 - 105 mg/dL    Urea Nitrogen 11 7 - 25 mg/dL    Creatinine 0.85 0.60 - 1.20 mg/dL    GFR Estimate 71 >60 mL/min/[1.73_m2]    GFR Estimate If Black 86 >60 mL/min/[1.73_m2]    Calcium 9.6 8.6 - 10.3 mg/dL    Bilirubin Total 0.5 0.3 - 1.0 mg/dL    Albumin 4.4 3.5 - 5.7 g/dL    Protein Total 7.2 6.4 - 8.9 g/dL    Alkaline Phosphatase 84 34 - 104 U/L    ALT 16 7 - 52 U/L    AST 19 13 - 39 U/L     Results for orders placed or performed in visit on 08/14/19   Hemoglobin A1c   Result Value Ref Range    Hemoglobin A1C 5.7 4.0 - 6.0 %   Lipid Panel   Result Value Ref Range    Cholesterol 131 <200 mg/dL    Triglycerides 140 <150 mg/dL    HDL Cholesterol 36 23 - 92 mg/dL    LDL Cholesterol Calculated 67 <100 mg/dL    Non HDL Cholesterol 95 <130 mg/dL   Comprehensive Metabolic Panel   Result Value Ref Range    Sodium 137 134 - 144 mmol/L    Potassium 4.4 3.5 - 5.1 mmol/L    Chloride 106 98 - 107 mmol/L    Carbon Dioxide 26 21 - 31 mmol/L    Anion Gap 5 3 - 14 mmol/L    Glucose 95 70 - 105 mg/dL    Urea Nitrogen 11 7 - 25 mg/dL    Creatinine 0.85 0.60 - 1.20 mg/dL    GFR Estimate 71 >60 mL/min/[1.73_m2]    GFR Estimate If Black 86 >60 mL/min/[1.73_m2]    Calcium 9.6 8.6 - 10.3 mg/dL    Bilirubin Total 0.5 0.3 -  1.0 mg/dL    Albumin 4.4 3.5 - 5.7 g/dL    Protein Total 7.2 6.4 - 8.9 g/dL    Alkaline Phosphatase 84 34 - 104 U/L    ALT 16 7 - 52 U/L    AST 19 13 - 39 U/L       ASSESSMENT AND PLAN:  Problem List Items Addressed This Visit        Other    History of hysterectomy      Other Visit Diagnoses     Family history of diabetes mellitus    -  Primary    Relevant Orders    Lipid Panel (Completed)    Comprehensive Metabolic Panel (Completed)    Hemoglobin A1c (Completed)    Mixed hyperlipidemia        Tobacco use        Relevant Medications    amphetamine-dextroamphetamine (ADDERALL XR) 20 MG 24 hr capsule    amphetamine-dextroamphetamine (ADDERALL) 5 MG tablet    Encounter for medication refill            Congratulated on her weight loss--- she will continue walking monitoring intake and activity on her Fitbit highly motivated    We will continue to work with Munira Milton on her ADHD and anxiety--- she feels optimized on current meds with Adderall and Prozac    Strongly encouraged tobacco cessation--pre-contemplative she will let me know when she is ready to quit    She would like to try going off of her statin for 3 months to see if she can manage her lipids with diet and exercise--this is reasonable  She will get lipids rechecked in 3 months after stopping rosuvastatin--- orders placed for fasting lipids in November          -- Expected clinical course discussed    -- Medications and their side effects discussed    There are no Patient Instructions on file for this visit.  Telma Garcia CNP  [facility]     Portions of this note were dictated using speech recognition software. The note has been proofread but errors in the text may have been overlooked. Please contact me if there are any concerns regarding the accuracy of the dictation.

## 2019-08-14 NOTE — NURSING NOTE
Patient presents to clinic today for a physical. Patient wants to complete his labs.     No LMP recorded. Patient has had a hysterectomy.  Medication Reconciliation: complete    Tati Albarado LPN  8/14/2019 9:03 AM

## 2019-08-14 NOTE — LETTER
My Depression Action Plan  Name: Rose Mendez   Date of Birth 1970  Date: 8/14/2019    My doctor: Telma Garcia   My clinic: [facility]  [address]  [place] [id number] [phone]          GREEN    ZONE   Good Control    What it looks like:     Things are going generally well. You have normal up s and down s. You may even feel depressed from time to time, but bad moods usually last less than a day.   What you need to do:  1. Continue to care for yourself (see self care plan)  2. Check your depression survival kit and update it as needed  3. Follow your physician s recommendations including any medication.  4. Do not stop taking medication unless you consult with your physician first.           YELLOW         ZONE Getting Worse    What it looks like:     Depression is starting to interfere with your life.     It may be hard to get out of bed; you may be starting to isolate yourself from others.    Symptoms of depression are starting to last most all day and this has happened for several days.     You may have suicidal thoughts but they are not constant.   What you need to do:     1. Call your care team, your response to treatment will improve if you keep your care team informed of your progress. Yellow periods are signs an adjustment may need to be made.     2. Continue your self-care, even if you have to fake it!    3. Talk to someone in your support network    4. Open up your depression survival kit           RED    ZONE Medical Alert - Get Help    What it looks like:     Depression is seriously interfering with your life.     You may experience these or other symptoms: You can t get out of bed most days, can t work or engage in other necessary activities, you have trouble taking care of basic hygiene, or basic responsibilities, thoughts of suicide or death that will not go away, self-injurious behavior.     What you need to do:  1. Call your care team  and request a same-day appointment. If they are not available (weekends or after hours) call your local crisis line, emergency room or 911.            Depression Self Care Plan / Survival Kit    Self-Care for Depression  Here s the deal. Your body and mind are really not as separate as most people think.  What you do and think affects how you feel and how you feel influences what you do and think. This means if you do things that people who feel good do, it will help you feel better.  Sometimes this is all it takes.  There is also a place for medication and therapy depending on how severe your depression is, so be sure to consult with your medical provider and/ or Behavioral Health Consultant if your symptoms are worsening or not improving.     In order to better manage my stress, I will:    Exercise  Get some form of exercise, every day. This will help reduce pain and release endorphins, the  feel good  chemicals in your brain. This is almost as good as taking antidepressants!  This is not the same as joining a gym and then never going! (they count on that by the way ) It can be as simple as just going for a walk or doing some gardening, anything that will get you moving.      Hygiene   Maintain good hygiene (Get out of bed in the morning, Make your bed, Brush your teeth, Take a shower, and Get dressed like you were going to work, even if you are unemployed).  If your clothes don't fit try to get ones that do.    Diet  I will strive to eat foods that are good for me, drink plenty of water, and avoid excessive sugar, caffeine, alcohol, and other mood-altering substances.  Some foods that are helpful in depression are: complex carbohydrates, B vitamins, flaxseed, fish or fish oil, fresh fruits and vegetables.    Psychotherapy  I agree to participate in Individual Therapy (if recommended).    Medication  If prescribed medications, I agree to take them.  Missing doses can result in serious side effects.  I understand  that drinking alcohol, or other illicit drug use, may cause potential side effects.  I will not stop my medication abruptly without first discussing it with my provider.    Staying Connected With Others  I will stay in touch with my friends, family members, and my primary care provider/team.    Use your imagination  Be creative.  We all have a creative side; it doesn t matter if it s oil painting, sand castles, or mud pies! This will also kick up the endorphins.    Witness Beauty  (AKA stop and smell the roses) Take a look outside, even in mid-winter. Notice colors, textures. Watch the squirrels and birds.     Service to others  Be of service to others.  There is always someone else in need.  By helping others we can  get out of ourselves  and remember the really important things.  This also provides opportunities for practicing all the other parts of the program.    Humor  Laugh and be silly!  Adjust your TV habits for less news and crime-drama and more comedy.    Control your stress  Try breathing deep, massage therapy, biofeedback, and meditation. Find time to relax each day.     My support system    Clinic Contact:  Phone number:    Contact 1:  Phone number:    Contact 2:  Phone number:    [employer]/:  Phone number:    Therapist:  Phone number:    Local crisis center:    Phone number:    Other community support:  Phone number:

## 2019-10-17 DIAGNOSIS — E78.00 PURE HYPERCHOLESTEROLEMIA: ICD-10-CM

## 2019-10-17 RX ORDER — ROSUVASTATIN CALCIUM 10 MG/1
TABLET, COATED ORAL
Qty: 90 TABLET | Refills: 3 | Status: SHIPPED | OUTPATIENT
Start: 2019-10-17 | End: 2020-09-10

## 2019-10-17 NOTE — TELEPHONE ENCOUNTER
LOV 8/14/19. Prescription approved per [facility] Refill Protocol.  Sonia Craig RN...........10/17/2019 8:13 AM

## 2019-11-03 ENCOUNTER — HEALTH MAINTENANCE LETTER (OUTPATIENT)
Age: 49
End: 2019-11-03

## 2020-03-15 ENCOUNTER — OFFICE VISIT (OUTPATIENT)
Dept: FAMILY MEDICINE | Facility: OTHER | Age: 50
End: 2020-03-15
Attending: FAMILY MEDICINE
Payer: COMMERCIAL

## 2020-03-15 VITALS
SYSTOLIC BLOOD PRESSURE: 118 MMHG | TEMPERATURE: 97.6 F | HEIGHT: 66 IN | HEART RATE: 80 BPM | WEIGHT: 170.7 LBS | RESPIRATION RATE: 18 BRPM | BODY MASS INDEX: 27.43 KG/M2 | DIASTOLIC BLOOD PRESSURE: 84 MMHG

## 2020-03-15 DIAGNOSIS — M26.609 TMJ (TEMPOROMANDIBULAR JOINT SYNDROME): Primary | ICD-10-CM

## 2020-03-15 PROCEDURE — 99213 OFFICE O/P EST LOW 20 MIN: CPT | Performed by: FAMILY MEDICINE

## 2020-03-15 ASSESSMENT — ENCOUNTER SYMPTOMS
DIAPHORESIS: 0
FATIGUE: 0
CHILLS: 0
ACTIVITY CHANGE: 0
PALPITATIONS: 0

## 2020-03-15 ASSESSMENT — PAIN SCALES - GENERAL: PAINLEVEL: MODERATE PAIN (5)

## 2020-03-15 ASSESSMENT — MIFFLIN-ST. JEOR: SCORE: 1411.04

## 2020-03-16 NOTE — PROGRESS NOTES
Nursing Notes:   Martina Landeros LPN  3/15/2020  7:41 PM  Signed  Patient presents to the clinic today for right ear and jaw pain x1 month.  Martina Landeros LPN.................. 3/15/2020 7:23 PM   Med rec complete.    SUBJECTIVE:   Rose Mendez is a 49 year old female who presents to clinic today for the following health issues:    JOSELO Rose is here for R sided ear pain for one month; wanting to make sure it is not infection.  She is aware she has jaw/TMJ issues related to teeth clenching.  Already has mouth guard and wearing more frequently now with minimal improvement.     Patient Active Problem List    Diagnosis Date Noted     ADHD (attention deficit hyperactivity disorder), combined type 11/25/2018     Priority: Medium     Diagnostic assessment completed by Lena Ram LP at [facility] psychological [facility] 10/2018--see scans       Adjustment disorder with mixed anxiety and depressed mood 02/16/2018     Priority: Medium     Diagnostic assessment final impression--[facility] psychological [facility] L[facility] RUSHING--10/2018       Tobacco abuse 02/16/2018     Priority: Medium     Tobacco dependence 02/10/2016     Priority: Medium     Status post finger joint fusion 12/15/2015     Priority: Medium     MERCEDES on CPAP 07/10/2015     Priority: Medium     Overview:   Sleep studies and followup per Dr Alcantara 7/2015       History of hysterectomy 02/25/2015     Priority: Medium     Vitamin D deficiency 02/13/2015     Priority: Medium     Snoring 02/12/2015     Priority: Medium     Multiple thyroid nodules 01/19/2015     Priority: Medium     Ganglion cyst of wrist 09/03/2014     Priority: Medium     CMC arthritis, thumb, degenerative 08/27/2014     Priority: Medium     Past Medical History:   Diagnosis Date     ADHD (attention deficit hyperactivity disorder), combined type 11/25/2018    Diagnostic assessment completed by Lena Ram [facility] at [facility]  10/2018--see scans     Adjustment disorder with mixed anxiety and depressed mood 2018    Overview:  with mood disorder     Adverse effect of anesthetic     2005,Notes prior complications from anesthesia:  (2005) Nausea     Dependence on other enabling machines and devices     7/10/2015,Sleep studies and followup per Dr Alcantara 2015     Disturbance of skin sensation     left side, resolved. Normal MRI     Ganglion of wrist     9/3/2014     Injury of ulnar nerve of forearm     (left Dr Riggs     Nontoxic multinodular goiter     2015     Osteoarthritis of first carpometacarpal joint     2014     Other fracture of left lower leg, initial encounter for closed fracture     No Comments Provided     Personal history of other medical treatment (CODE)     20 c- sections     Vitamin D deficiency     2015      Past Surgical History:   Procedure Laterality Date     AS REVISE ULNAR NERVE AT WRIST Left 2005,73181.0,NV REVISE ULNAR NERVE AT WRIST,left, [place]      SECTION            SECTION      ,with postpartum tubal sterilization Status post left elbow ulnar nerve transposition .     COLONOSCOPY N/A 3/1/2018    Procedure: COMBINED COLONOSCOPY, SINGLE OR MULTIPLE BIOPSY/POLYPECTOMY BY BIOPSY;  Colonoscopy;  Surgeon: Ramona Vegas MD;  Location: [facility] OR     HC ABLATION, ENDOMETRIAL, THERMAL, W/O HYSTEROSCOPIC GUIDANCE      5/4/10,612692,ABLATION,endometrial     HYSTERECTOMY VAGINAL      7/6/10,Laparoscopic Assisted Vaginal Hysterectomy     Family History   Problem Relation Age of Onset     Family History Negative Daughter         Good Health     Family History Negative Daughter         Good Health     Family History Negative Daughter         Good Health     Prostate Cancer Father         Cancer-prostate     Seizure Disorder Father         head injury     Hypertension Mother         Hypertension     Diabetes Maternal Grandmother         Diabetes     Heart  Disease Paternal Grandfather 62        Heart Disease,MI      Coronary Artery Disease Paternal Grandfather 62        Coronary artery disease     Hypertension Maternal Aunt         Hypertension     Diabetes Maternal Aunt         Diabetes     Breast Cancer Maternal Aunt 70        Cancer-breast     Coronary Artery Disease Maternal Aunt 60        Coronary artery disease     Seizure Disorder Sister         congenital hypoxia     Thyroid Disease Sister         Thyroid Disease     Hypertension Maternal Uncle         Hypertension     Diabetes Maternal Uncle         Diabetes     Coronary Artery Disease Maternal Uncle 40        Coronary artery disease     Diabetes Paternal Aunt         Diabetes     Diabetes Paternal Uncle         Diabetes     Allergy (Severe) No family hx of         Allergies     Anesthesia Reaction No family hx of         Anesthesia Problem     Blood Disease No family hx of         Blood Disease     Colon Cancer No family hx of         Cancer-colon     Ovarian Cancer No family hx of         Cancer-ovarian     Other - See Comments No family hx of         Stroke     Social History     Tobacco Use     Smoking status: Current Every Day Smoker     Packs/day: 1.00     Years: 30.00     Pack years: 30.00     Types: Cigarettes     Smokeless tobacco: Never Used     Tobacco comment: Quit smoking: occ - most days   Substance Use Topics     Alcohol use: No     Alcohol/week: 0.0 standard drinks     Social History     Social History Narrative     .  Raising her 3 children.  Ex-, Osman, sees daughters once a month.    Martina    Child, date of birth 95  Katrina    Child, date of birth 97  Shannon    Child, date of birth 98  Patient currently smokes.     Current Outpatient Medications   Medication Sig Dispense Refill     amphetamine-dextroamphetamine (ADDERALL XR) 20 MG 24 hr capsule Take 1 capsule by mouth every morning       amphetamine-dextroamphetamine (ADDERALL) 5 MG tablet Take 1  "tablet by mouth daily Every early afternoon       Cholecalciferol (D 5000) 5000 UNITS TABS Take 5,000 Units by mouth daily 30 tablet 2     FLUoxetine (PROZAC) 20 MG capsule Take 1 capsule by mouth At Bedtime       rosuvastatin (CRESTOR) 10 MG tablet TAKE 1 TABLET(10 MG) BY MOUTH DAILY 90 tablet 3     No Known Allergies    Review of Systems   Constitutional: Negative for activity change, chills, diaphoresis and fatigue.   HENT: Positive for dental problem.    Cardiovascular: Negative for chest pain and palpitations.   All other systems reviewed and are negative.       OBJECTIVE:     /84   Pulse 80   Temp 97.6  F (36.4  C) (Tympanic)   Resp 18   Ht 1.676 m (5' 6\")   Wt 77.4 kg (170 lb 11.2 oz)   Breastfeeding No   BMI 27.55 kg/m    Body mass index is 27.55 kg/m .  Physical Exam  Constitutional:       Appearance: Normal appearance. She is normal weight.   HENT:      Head: Normocephalic.      Right Ear: Tympanic membrane, ear canal and external ear normal.      Left Ear: Tympanic membrane, ear canal and external ear normal.      Nose: Nose normal.      Mouth/Throat:      Mouth: Mucous membranes are moist.      Comments: TMJ with clicking/popping on L>R; but some subluxation noted toward R.  Most tender pre-auricular region; and slightly infra-auricular.  No mastoid tenderness.  Eyes:      Pupils: Pupils are equal, round, and reactive to light.   Neck:      Musculoskeletal: Normal range of motion and neck supple.   Cardiovascular:      Rate and Rhythm: Normal rate and regular rhythm.   Neurological:      Mental Status: She is alert.     Diagnostic Test Results:  none     ASSESSMENT/PLAN:     1. TMJ (temporomandibular joint syndrome)  With referred otalgia; reassurance of normal ear exam.  Encouraged continued follow up with dentist to fix tooth; and continue use of mouthguard.  Declines PT referral for TMJ tonight; and will see how things go with her dentist (will call to set up sooner appointment than " April).  May need MRI/OMFS referral in the future.    Follow up prn.    Nancy Lau, [facility]

## 2020-03-16 NOTE — NURSING NOTE
Patient presents to the clinic today for right ear and jaw pain x1 month.  Martina Landeros LPN.................. 3/15/2020 7:23 PM   Med rec complete.

## 2020-09-10 ENCOUNTER — OFFICE VISIT (OUTPATIENT)
Dept: FAMILY MEDICINE | Facility: OTHER | Age: 50
End: 2020-09-10
Attending: FAMILY MEDICINE
Payer: COMMERCIAL

## 2020-09-10 ENCOUNTER — HOSPITAL ENCOUNTER (OUTPATIENT)
Dept: MAMMOGRAPHY | Facility: OTHER | Age: 50
End: 2020-09-10
Attending: FAMILY MEDICINE
Payer: COMMERCIAL

## 2020-09-10 VITALS
SYSTOLIC BLOOD PRESSURE: 134 MMHG | HEART RATE: 80 BPM | RESPIRATION RATE: 20 BRPM | TEMPERATURE: 98.9 F | OXYGEN SATURATION: 98 % | BODY MASS INDEX: 26.52 KG/M2 | HEIGHT: 66 IN | WEIGHT: 165 LBS | DIASTOLIC BLOOD PRESSURE: 86 MMHG

## 2020-09-10 DIAGNOSIS — R73.03 PRE-DIABETES: ICD-10-CM

## 2020-09-10 DIAGNOSIS — E78.00 PURE HYPERCHOLESTEROLEMIA: ICD-10-CM

## 2020-09-10 DIAGNOSIS — Z12.31 VISIT FOR SCREENING MAMMOGRAM: ICD-10-CM

## 2020-09-10 DIAGNOSIS — Z00.00 ANNUAL PHYSICAL EXAM: Primary | ICD-10-CM

## 2020-09-10 DIAGNOSIS — R55 VASOMOTOR INSTABILITY: ICD-10-CM

## 2020-09-10 LAB
ALBUMIN SERPL-MCNC: 4.4 G/DL (ref 3.5–5.7)
ALP SERPL-CCNC: 94 U/L (ref 34–104)
ALT SERPL W P-5'-P-CCNC: 9 U/L (ref 7–52)
ANION GAP SERPL CALCULATED.3IONS-SCNC: 6 MMOL/L (ref 3–14)
AST SERPL W P-5'-P-CCNC: 15 U/L (ref 13–39)
BILIRUB SERPL-MCNC: 0.4 MG/DL (ref 0.3–1)
BUN SERPL-MCNC: 11 MG/DL (ref 7–25)
CALCIUM SERPL-MCNC: 9.6 MG/DL (ref 8.6–10.3)
CHLORIDE SERPL-SCNC: 101 MMOL/L (ref 98–107)
CHOLEST SERPL-MCNC: 236 MG/DL
CO2 SERPL-SCNC: 28 MMOL/L (ref 21–31)
CORTIS SERPL-MCNC: 9.3 UG/DL (ref 4–22)
CREAT SERPL-MCNC: 0.88 MG/DL (ref 0.6–1.2)
GFR SERPL CREATININE-BSD FRML MDRD: 68 ML/MIN/{1.73_M2}
GLUCOSE SERPL-MCNC: 89 MG/DL (ref 70–105)
HBA1C MFR BLD: 5.8 % (ref 4–6)
HDLC SERPL-MCNC: 39 MG/DL (ref 23–92)
LDLC SERPL CALC-MCNC: 143 MG/DL
NONHDLC SERPL-MCNC: 197 MG/DL
POTASSIUM SERPL-SCNC: 4.2 MMOL/L (ref 3.5–5.1)
PROT SERPL-MCNC: 7.3 G/DL (ref 6.4–8.9)
SODIUM SERPL-SCNC: 135 MMOL/L (ref 134–144)
T3FREE SERPL-MCNC: 3.4 PG/ML (ref 2.5–3.9)
TRIGL SERPL-MCNC: 272 MG/DL
TSH SERPL DL<=0.05 MIU/L-ACNC: 3.47 IU/ML (ref 0.34–5.6)

## 2020-09-10 PROCEDURE — 36415 COLL VENOUS BLD VENIPUNCTURE: CPT | Mod: ZL | Performed by: FAMILY MEDICINE

## 2020-09-10 PROCEDURE — 80061 LIPID PANEL: CPT | Mod: ZL | Performed by: FAMILY MEDICINE

## 2020-09-10 PROCEDURE — 84443 ASSAY THYROID STIM HORMONE: CPT | Mod: ZL | Performed by: FAMILY MEDICINE

## 2020-09-10 PROCEDURE — 82533 TOTAL CORTISOL: CPT | Mod: ZL | Performed by: FAMILY MEDICINE

## 2020-09-10 PROCEDURE — 99396 PREV VISIT EST AGE 40-64: CPT | Performed by: FAMILY MEDICINE

## 2020-09-10 PROCEDURE — 84481 FREE ASSAY (FT-3): CPT | Mod: ZL | Performed by: FAMILY MEDICINE

## 2020-09-10 PROCEDURE — 77067 SCR MAMMO BI INCL CAD: CPT

## 2020-09-10 PROCEDURE — 83036 HEMOGLOBIN GLYCOSYLATED A1C: CPT | Mod: ZL | Performed by: FAMILY MEDICINE

## 2020-09-10 PROCEDURE — 80053 COMPREHEN METABOLIC PANEL: CPT | Mod: ZL | Performed by: FAMILY MEDICINE

## 2020-09-10 RX ORDER — ROSUVASTATIN CALCIUM 10 MG/1
10 TABLET, COATED ORAL DAILY
Qty: 90 TABLET | Refills: 3 | Status: CANCELLED | OUTPATIENT
Start: 2020-09-10

## 2020-09-10 ASSESSMENT — ANXIETY QUESTIONNAIRES
IF YOU CHECKED OFF ANY PROBLEMS ON THIS QUESTIONNAIRE, HOW DIFFICULT HAVE THESE PROBLEMS MADE IT FOR YOU TO DO YOUR WORK, TAKE CARE OF THINGS AT HOME, OR GET ALONG WITH OTHER PEOPLE: NOT DIFFICULT AT ALL
6. BECOMING EASILY ANNOYED OR IRRITABLE: NOT AT ALL
2. NOT BEING ABLE TO STOP OR CONTROL WORRYING: SEVERAL DAYS
3. WORRYING TOO MUCH ABOUT DIFFERENT THINGS: SEVERAL DAYS
GAD7 TOTAL SCORE: 3
5. BEING SO RESTLESS THAT IT IS HARD TO SIT STILL: NOT AT ALL
1. FEELING NERVOUS, ANXIOUS, OR ON EDGE: NOT AT ALL
7. FEELING AFRAID AS IF SOMETHING AWFUL MIGHT HAPPEN: NOT AT ALL

## 2020-09-10 ASSESSMENT — PATIENT HEALTH QUESTIONNAIRE - PHQ9
5. POOR APPETITE OR OVEREATING: SEVERAL DAYS
SUM OF ALL RESPONSES TO PHQ QUESTIONS 1-9: 3

## 2020-09-10 ASSESSMENT — PAIN SCALES - GENERAL: PAINLEVEL: NO PAIN (0)

## 2020-09-10 ASSESSMENT — MIFFLIN-ST. JEOR: SCORE: 1380.19

## 2020-09-10 NOTE — PROGRESS NOTES
Female Physical Note    Concerns today:   Nursing Notes:   Kait Bourne LPN  9/10/2020 10:23 AM  Signed  Patient is here for annual physical. Patient also is needing to establish care.     No LMP recorded (lmp unknown). Patient has had a hysterectomy.  Medication Reconciliation: complete    Kait JOHNNYDeborah Bourne LPN  9/10/2020 9:51 AM      Pleasant 50-year-old G3, P3 presents for annual preventive physical as well as establish care.  Has a previous hysterectomy.  No history of abnormal Pap smears.    Her main concern is temperature intolerance.  She has episodes of profuse sweating and episodes of feeling overheated.  This is been going on for many years.  She has had her thyroid checked in the past and it is been normal.  She does not feel these are hot flashes because they can last many hours to even a half a day.      ROS:  CONSTITUTIONAL: no fatigue, no unexpected change in weight  SKIN: no worrisome rashes, no worrisome moles, no worrisome lesions  EYES: no acute vision problems or changes  ENT: no ear problems, no mouth problems, no throat problems  RESP: no significant cough, no shortness of breath  CV: no chest pain, no palpitations, no new or worsening peripheral edema  GI: no nausea, no vomiting, no constipation, no diarrhea    Sexually Active: Yes  Sexual concerns: No   Contraception:not needed  G:3 P: 3  Menarche:  No LMP recorded (lmp unknown). Patient has had a hysterectomy.   STD History: Neg  Last Pap Smear Date:    Abnormal Pap History:     Patient Active Problem List   Diagnosis     Adjustment disorder with mixed anxiety and depressed mood     CMC arthritis, thumb, degenerative     Ganglion cyst of wrist     History of hysterectomy     Multiple thyroid nodules     MERCEDES on CPAP     Snoring     Tobacco abuse     Tobacco dependence     Vitamin D deficiency     Status post finger joint fusion     ADHD (attention deficit hyperactivity disorder), combined type       Current Outpatient Medications    Medication Sig Dispense Refill     amphetamine-dextroamphetamine (ADDERALL XR) 20 MG 24 hr capsule Take 1 capsule by mouth every morning       amphetamine-dextroamphetamine (ADDERALL) 5 MG tablet Take 1 tablet by mouth daily Every early afternoon       Cholecalciferol (D 5000) 5000 UNITS TABS Take 5,000 Units by mouth daily 30 tablet 2       Past Medical History:   Diagnosis Date     ADHD (attention deficit hyperactivity disorder), combined type 11/25/2018    Diagnostic assessment completed by Lena Ram LP at [facility] services 10/2018--see scans     Adjustment disorder with mixed anxiety and depressed mood 2/16/2018    Overview:  with mood disorder     Adverse effect of anesthetic     08/01/2005,Notes prior complications from anesthesia:  (08/01/2005) Nausea     Dependence on other enabling machines and devices     7/10/2015,Sleep studies and followup per Dr Alcantara 7/2015     Disturbance of skin sensation     left side, resolved. Normal MRI     Ganglion of wrist     9/3/2014     Injury of ulnar nerve of forearm     (left Dr Riggs     Nontoxic multinodular goiter     1/19/2015     Osteoarthritis of first carpometacarpal joint     8/27/2014     Other fracture of left lower leg, initial encounter for closed fracture     No Comments Provided     Personal history of other medical treatment (CODE)     20 c- sections     Vitamin D deficiency     2/13/2015        Family History     Problem (# of Occurrences) Relation (Name,Age of Onset)    Breast Cancer (1) Maternal Aunt (70): Cancer-breast    Coronary Artery Disease (3) Paternal Grandfather (62): Coronary artery disease, Maternal Aunt (60): Coronary artery disease, Maternal Uncle (40): Coronary artery disease    Diabetes (5) Maternal Grandmother: Diabetes, Maternal Aunt: Diabetes, Maternal Uncle: Diabetes, Paternal Aunt: Diabetes, Paternal Uncle: Diabetes    Family History Negative (3) Daughter Karon): Good Health, Daughter  (Katrina): Good Health, Daughter Kailey): Good Health    Heart Disease (1) Paternal Grandfather (62): Heart Disease,MI     Hypertension (3) Mother: Hypertension, Maternal Aunt: Hypertension, Maternal Uncle: Hypertension    Prostate Cancer (1) Father: Cancer-prostate    Seizure Disorder (2) Father: head injury, Sister: congenital hypoxia    Thyroid Disease (1) Sister: Thyroid Disease       Negative family history of: Allergy (Severe), Anesthesia Reaction, Blood Disease, Colon Cancer, Ovarian Cancer, Other - See Comments          Problem List Medication List and Allergy List were reviewed.    Patient is an established patient of this clinic..    Social History     Tobacco Use     Smoking status: Current Every Day Smoker     Packs/day: 1.00     Years: 30.00     Pack years: 30.00     Types: Cigarettes     Smokeless tobacco: Never Used     Tobacco comment: Quit smoking: occ - most days   Substance Use Topics     Alcohol use: Not Currently     Alcohol/week: 0.0 standard drinks     Comment: occ     Single  Children ? yes     Has anyone hurt you physically, for example by pushing, hitting, slapping or kicking you or forcing you to have sex? Denies  Do you feel threatened or controlled by a partner, ex-partner or anyone in your life? Denies    RISK BEHAVIORS AND HEALTHY HABITS:  Tobacco Use/Smoking: None and Details 1/2  Illicit Drug Use: None  Do you use alcohol? Yes  Diet (5-7 servings of fruits/veg daily): Yes   Exercise (30 min accumulated most days):No  Dental Care: Yes   Calcium 1500 mg/d:  Yes  Seat Belt Use: Yes       Breast CA Screening (>41 yo or 10 y before 1st degree relative diagnosis): Recommended and patient accepted testing.  CV Risk based on Pooled Cohort Risk:   Pooled Cohort Risk Calculator    Immunization History   Administered Date(s) Administered     DT (PEDS <7y) 1970, 1970, 1970     DTAP (<7y) 1970, 1970, 1970     Dtap/ipv/hib, Non-us 1970, 1970,  "04/25/1970     Historical mumps 02/27/1971     Influenza (IIV3) PF 12/27/2013     Influenza, Whole Virus 12/27/2013     MMR 02/27/1971, 09/20/2017     Measles 04/25/1970     OPV, unspecified 1969, 1969, 05/29/1970     Polio, Unspecified  1969, 1969, 05/29/1970     Rubella 08/15/1970     Small Pox 1969, 1969     TDAP Vaccine (Adacel) 02/10/2010     TDAP Vaccine (Boostrix) 02/10/2010     Td (Adult), Adsorbed 02/10/2010    Reviewed Immunization Record Today    EXAMINATION:   /86 (BP Location: Right arm, Patient Position: Sitting, Cuff Size: Adult Regular)   Pulse 80   Temp 98.9  F (37.2  C) (Tympanic)   Resp 20   Ht 1.676 m (5' 6\")   Wt 74.8 kg (165 lb)   LMP  (LMP Unknown)   SpO2 98%   Breastfeeding No   BMI 26.63 kg/m    GENERAL: healthy, alert and no distress  EYES: Eyes grossly normal to inspection, extraocular movements - intact, and PERRL  HENT: ear canals- normal; TMs- normal; Nose- normal; Mouth- no ulcers, no lesions  NECK: no tenderness, no adenopathy, no asymmetry, no masses, no stiffness; thyroid- normal to palpation  RESP: lungs clear to auscultation - no rales, no rhonchi, no wheezes  BREAST: no masses, no tenderness, no nipple discharge, no palpable axillary masses or adenopathy  CV: regular rates and rhythm, normal S1 S2, no S3 or S4 and no murmur, no click or rub -  ABDOMEN: soft, no tenderness, no  hepatosplenomegaly, no masses, normal bowel sounds  MS: extremities- no gross deformities noted, no edema  SKIN: no suspicious lesions, no rashes  NEURO: strength and tone- normal, sensory exam- grossly normal, mentation- intact, speech- normal, reflexes- symmetric  LYMPHATICS: ant. cervical- normal, post. cervical- normal, axillary- normal, supraclavicular- normal, inguinal- normal    ASSESSMENT:  1. Annual physical exam    2. Pure hypercholesterolemia    3. Pre-diabetes    4. Vasomotor instability          PLAN:  Patient is counseled on importance of " regular self breast exams and mammograms every 1-2 years starting at age 40. P. Discussed importance of calcium and vitamin D supplementation and osteoporosis screening. Immunizations are updated based on CDC recommendations and patients desire. Reviewed importance of sunscreen, limit sun exposure and monitoring for changing moles with ABCDEs. Recommend seatbelt use and helmets with biking, skiing and ATV/Snowmobile use.    There are no Patient Instructions on file for this visit.        Nena Stafford MD

## 2020-09-10 NOTE — NURSING NOTE
Patient is here for annual physical. Patient also is needing to establish care.     No LMP recorded (lmp unknown). Patient has had a hysterectomy.  Medication Reconciliation: complete    Kait Bourne LPN  9/10/2020 9:51 AM

## 2020-09-11 ASSESSMENT — ANXIETY QUESTIONNAIRES: GAD7 TOTAL SCORE: 3

## 2020-09-14 ENCOUNTER — OFFICE VISIT (OUTPATIENT)
Dept: FAMILY MEDICINE | Facility: OTHER | Age: 50
End: 2020-09-14
Attending: NURSE PRACTITIONER
Payer: COMMERCIAL

## 2020-09-14 ENCOUNTER — HOSPITAL ENCOUNTER (OUTPATIENT)
Dept: GENERAL RADIOLOGY | Facility: OTHER | Age: 50
End: 2020-09-14
Attending: NURSE PRACTITIONER
Payer: COMMERCIAL

## 2020-09-14 VITALS
SYSTOLIC BLOOD PRESSURE: 124 MMHG | HEART RATE: 69 BPM | TEMPERATURE: 98.4 F | WEIGHT: 167.2 LBS | OXYGEN SATURATION: 95 % | RESPIRATION RATE: 18 BRPM | HEIGHT: 66 IN | DIASTOLIC BLOOD PRESSURE: 90 MMHG | BODY MASS INDEX: 26.87 KG/M2

## 2020-09-14 DIAGNOSIS — S82.891A AVULSION FRACTURE OF RIGHT ANKLE, CLOSED, INITIAL ENCOUNTER: Primary | ICD-10-CM

## 2020-09-14 DIAGNOSIS — S93.421A SPRAIN OF DELTOID LIGAMENT OF RIGHT ANKLE, INITIAL ENCOUNTER: ICD-10-CM

## 2020-09-14 DIAGNOSIS — S99.911A ANKLE INJURY, RIGHT, INITIAL ENCOUNTER: ICD-10-CM

## 2020-09-14 PROCEDURE — 99214 OFFICE O/P EST MOD 30 MIN: CPT | Performed by: NURSE PRACTITIONER

## 2020-09-14 PROCEDURE — 73610 X-RAY EXAM OF ANKLE: CPT | Mod: RT

## 2020-09-14 RX ORDER — IBUPROFEN 600 MG/1
600 TABLET, FILM COATED ORAL EVERY 6 HOURS PRN
COMMUNITY
End: 2021-04-22

## 2020-09-14 SDOH — HEALTH STABILITY: MENTAL HEALTH: HOW OFTEN DO YOU HAVE A DRINK CONTAINING ALCOHOL?: MONTHLY OR LESS

## 2020-09-14 ASSESSMENT — MIFFLIN-ST. JEOR: SCORE: 1390.16

## 2020-09-14 ASSESSMENT — PAIN SCALES - GENERAL: PAINLEVEL: MILD PAIN (3)

## 2020-09-14 NOTE — LETTER
[facility]  [address]  [place] [address]  Phone: [phone]  Fax: [phone]    September 14, 2020        Rose Mendez  [address]  [facility] [address]          To whom it may concern:    RE: Rose Mendez    Patient was seen and treated today at our clinic for right ankle sprain and minor fracture. She is unable to drive and will need to and miss work due to injury.    Please contact me for questions or concerns.      Sincerely,        Heather Smith NP

## 2020-09-14 NOTE — NURSING NOTE
"Chief Complaint   Patient presents with     Musculoskeletal Problem     right ankle     Patient presents to clinic with right ankle pain. She states she was going to get off the couch and it felt rubbery and she fell and hurt it. This happened a couple of hours ago and she did take 3 advil to help with the pain.     Initial BP (!) 124/90 (BP Location: Right arm, Patient Position: Sitting, Cuff Size: Adult Regular)   Pulse 69   Temp 98.4  F (36.9  C) (Tympanic)   Resp 18   Ht 1.676 m (5' 6\")   Wt 75.8 kg (167 lb 3.2 oz)   LMP  (LMP Unknown)   SpO2 95%   Breastfeeding No   BMI 26.99 kg/m   Estimated body mass index is 26.99 kg/m  as calculated from the following:    Height as of this encounter: 1.676 m (5' 6\").    Weight as of this encounter: 75.8 kg (167 lb 3.2 oz).    Medication Reconciliation: complete      Guillermina Rousseau  "

## 2020-09-14 NOTE — PROGRESS NOTES
HPI:    Rose Mendez is a 50 year old female  who presents to [facility] today for ankle injury and pain.    States her right ankle felt rubbery and twisted when she stood up from the couch and tried to walked.  States she is having some pain in the lateral ankle and across the dorsal ankle/foot.  Denies any numbness or tingling in the foot.  Painful to bear weight but able to ambulate with discomfort.    States she had some tingling in her right fingers earlier as well that has since resolved.  Denies any weakness in any of her extremities.  Denies any headaches, dizziness or light headedness.    Iced a few times which decreased the swelling and pain.  Took Ibuprofen 600 mg which decreased the pain.        Past Medical History:   Diagnosis Date     ADHD (attention deficit hyperactivity disorder), combined type 11/25/2018    Diagnostic assessment completed by Lena Ram LP at [facility] psychological services 10/2018--see scans     Adjustment disorder with mixed anxiety and depressed mood 2/16/2018    Overview:  with mood disorder     Adverse effect of anesthetic     08/01/2005,Notes prior complications from anesthesia:  (08/01/2005) Nausea     Dependence on other enabling machines and devices     7/10/2015,Sleep studies and followup per Dr Alcantara 7/2015     Disturbance of skin sensation     left side, resolved. Normal MRI     Ganglion of wrist     9/3/2014     Injury of ulnar nerve of forearm     (left Dr Riggs     Nontoxic multinodular goiter     1/19/2015     Osteoarthritis of first carpometacarpal joint     8/27/2014     Other fracture of left lower leg, initial encounter for closed fracture     No Comments Provided     Personal history of other medical treatment (CODE)     20 c- sections     Vitamin D deficiency     2/13/2015     Past Surgical History:   Procedure Laterality Date     AS REVISE ULNAR NERVE AT WRIST Left 2005    2005,72094.0,[place] REVISE ULNAR NERVE AT WRIST,leftKeely  "     SECTION            SECTION      ,with postpartum tubal sterilization Status post left elbow ulnar nerve transposition .     COLONOSCOPY N/A 3/1/2018    Procedure: COMBINED COLONOSCOPY, SINGLE OR MULTIPLE BIOPSY/POLYPECTOMY BY BIOPSY;  Colonoscopy;  Surgeon: Ramona Vegas MD;  Location: [facility] OR     HC ABLATION, ENDOMETRIAL, THERMAL, W/O HYSTEROSCOPIC GUIDANCE      5/4/10,477947,ABLATION,endometrial     HYSTERECTOMY VAGINAL      7/6/10,Laparoscopic Assisted Vaginal Hysterectomy     Social History     Tobacco Use     Smoking status: Current Every Day Smoker     Packs/day: 1.00     Years: 30.00     Pack years: 30.00     Types: Cigarettes     Smokeless tobacco: Never Used     Tobacco comment: Quit smoking: occ - most days   Substance Use Topics     Alcohol use: Not Currently     Alcohol/week: 0.0 standard drinks     Frequency: Monthly or less     Comment: occ     Current Outpatient Medications   Medication Sig Dispense Refill     amphetamine-dextroamphetamine (ADDERALL XR) 20 MG 24 hr capsule Take 1 capsule by mouth every morning       amphetamine-dextroamphetamine (ADDERALL) 5 MG tablet Take 1 tablet by mouth daily Every early afternoon       Cholecalciferol (D 5000) 5000 UNITS TABS Take 5,000 Units by mouth daily 30 tablet 2     ibuprofen (ADVIL/MOTRIN) 600 MG tablet Take 600 mg by mouth every 6 hours as needed for moderate pain       No Known Allergies      Past medical history, past surgical history, current medications and allergies reviewed and accurate to the best of my knowledge.        ROS:  Refer to HPI    BP (!) 124/90 (BP Location: Right arm, Patient Position: Sitting, Cuff Size: Adult Regular)   Pulse 69   Temp 98.4  F (36.9  C) (Tympanic)   Resp 18   Ht 1.676 m (5' 6\")   Wt 75.8 kg (167 lb 3.2 oz)   LMP  (LMP Unknown)   SpO2 95%   Breastfeeding No   BMI 26.99 kg/m      EXAM:  General Appearance: Well appearing adult female, appropriate appearance for age. No acute " distress  Respiratory: normal chest wall and respirations.  Normal effort.  No cough appreciated.  Cardiovascular:  CMS intact to right lower extremity, no lower extremity edema, strong pedal pulse  Musculoskeletal:  Equal movement of bilateral upper extremities.  Equal movement of bilateral lower extremities.  Right ankle with minimal swelling around the lateral malleolus with mild tenderness to palpation.  Right ankle with no swelling but mild tenderness over the medial malleolus.  Right dorsal ankle and foot with mild tenderness to palpation, no noted swelling.  Right toes without swelling, able to wiggle without difficulty.   Full passive and active ROM of right ankle without guarding, noted discomfort with inversion and eversion, no discomfort with extension and flexion.    Dermatological: skin intact to right ankle and foot without erythema, bruising, abrasion or rash  Psychological: normal affect, alert, oriented, and pleasant.       Xray:  Results for orders placed or performed during the hospital encounter of 09/14/20   XR Ankle Right G/E 3 Views     Status: None    Narrative    Exam: XR ANKLE RT G/E 3 VW     History:Female, age 50 years, Ankle injury, right, initial encounter    Comparison:  None    Technique: Three views are submitted.    Findings: Bones are normally mineralized. Punctate calcifications  adjacent to the medial malleolus suggests injury to the deltoid  ligament. The ankle mortise remains congruent. Mild degenerative  changes are seen within the midfoot. No evidence of dislocation.           Impression    Impression:  Tiny calcification suggests avulsive injury involving the deltoid  ligament.    JOANN SMITH MD           ASSESSMENT/PLAN:  XRay of right ankle completed and reviewed, possible small avulsion fracture of medial malleolus, radiologist over read:  Tiny calcification suggests avulsive injury involving the deltoid ligament.  WBAT in walking boot.  Wear walking boot at all  times except bathing and icing.   No driving while in walking boot.  Elevate  Ice   Ibuprofen 400 mg to 600 mg BID to TID  Follow up with orthopedics for further evaluation and management.    1. Ankle injury, right, initial encounter    - XR Ankle Right G/E 3 Views; Future    2. Sprain of deltoid ligament of right ankle, initial encounter    - CAST BOOT- WALKING    - Orthopedic & Spine  Referral; Future      3. Avulsion fracture of right ankle, closed, initial encounter    - CAST BOOT- WALKING    - Orthopedic & Spine  Referral; Future          May use over-the-counter Tylenol or ibuprofen PRN    Discussed warning signs/symptoms indicative of need to f/u    Follow up if symptoms persist or worsen or concerns      I explained my diagnostic considerations and recommendations to the patient, who voiced understanding and agreement with the treatment plan. All questions were answered. We discussed potential side effects of any prescribed or recommended therapies, as well as expectations for response to treatments.    Disclaimer:  This note consists of words and symbols derived from keyboarding, dictation, or using voice recognition software. As a result, there may be errors in the script that have gone undetected. Please consider this when interpreting information found in this note.

## 2020-09-15 ENCOUNTER — TELEPHONE (OUTPATIENT)
Dept: FAMILY MEDICINE | Facility: OTHER | Age: 50
End: 2020-09-15

## 2020-09-15 NOTE — TELEPHONE ENCOUNTER
Patient works from home and work has a few questions about her work ability form.   Needs more to the work ability form and a time frame.  Please call hima at work.  Thank You Cheri Moon on 9/15/2020 at 1:28 PM

## 2020-09-16 ENCOUNTER — TELEPHONE (OUTPATIENT)
Dept: FAMILY MEDICINE | Facility: OTHER | Age: 50
End: 2020-09-16

## 2020-09-17 ENCOUNTER — OFFICE VISIT (OUTPATIENT)
Dept: ORTHOPEDICS | Facility: OTHER | Age: 50
End: 2020-09-17
Attending: NURSE PRACTITIONER
Payer: COMMERCIAL

## 2020-09-17 VITALS — DIASTOLIC BLOOD PRESSURE: 82 MMHG | SYSTOLIC BLOOD PRESSURE: 140 MMHG | HEART RATE: 72 BPM

## 2020-09-17 DIAGNOSIS — S93.421A SPRAIN OF DELTOID LIGAMENT OF RIGHT ANKLE, INITIAL ENCOUNTER: ICD-10-CM

## 2020-09-17 DIAGNOSIS — S82.891A AVULSION FRACTURE OF RIGHT ANKLE, CLOSED, INITIAL ENCOUNTER: ICD-10-CM

## 2020-09-17 PROCEDURE — 99202 OFFICE O/P NEW SF 15 MIN: CPT | Performed by: PODIATRIST

## 2020-09-17 NOTE — PROGRESS NOTES
Patient is here for consult on her right ankle injury.  Cailin Lua LPN .....................9/17/2020 3:42 PM

## 2020-09-18 NOTE — PROGRESS NOTES
Visit Date:   09/17/2020      HISTORY OF PRESENT ILLNESS:  Rose is a new patient to me.  She was working from home, stood up, and her ankle gave out on her, and she twisted it.  She has a lateral pain.  X-ray showed some osteophytes medially and concern for a deltoid ligament sprain, here to have this evaluated and discuss options.      HISTORY REVIEW:  I have reviewed this patient's past medical history, family history, social history as well as medications and allergies.  Any changes/additions were appropriately charted in the patient's electronic medical record.        PHYSICAL EXAMINATION:    CONSTITUTIONAL:  The patient is alert and oriented x3, well appearing and in no apparent distress.  Affect is pleasant and answers questions appropriately.   VASCULAR:  Circulation is intact with palpable pedal pulses and adequate capillary fill time to all digits.  Hair growth is present and appropriate to mid foot and digits.   NEUROLOGIC:  Light touch sensation is intact to digits.  There is a negative Tinel sign.  There are no signs of apparent nerve entrapment of superficial peroneal or common peroneal nerves.   INTEGUMENT:  No abnormal dermatologic lesions are noted.  Skin has normal texture and turgor.     MUSCULOSKELETAL:  Tender to palpate laterally.  Distal fibula along the lateral ligamentous complex.  Minimal to no pain medially.  Rectus foot type.  No obvious deformity.  Difficult to assess stability, as she is guarded.      IMAGING:  I did review x-rays taken previously.  They demonstrate loose avulsion off the medial malleolus, unsure if this is acute or old.  Certainly, she has an acute ankle sprain clinically, but no obvious acute osseous pathology appreciated otherwise.      ASSESSMENT:  Ankle sprain, lateral ligamentous attenuation.      PLAN:  I discussed condition and treatment with the patient today.  We will treat this as an acute sprain, start therapy, functional rehabilitation.  I put her in a brace  today instead of a boot.  She will progress in this.  We will keep her at home or seated work only at this point.  I will see her back in 2 weeks to reevaluate.  Consider MRI with ongoing pain.         IRINA MONDRAGON DPM             D: 2020   T: 2020   MT: JEFF      Name:     KEVIN PORRAS   MRN:      [id number]        Account:      [id number]   :      1970           Visit Date:   2020      Document: [id number]

## 2020-09-21 ENCOUNTER — HOSPITAL ENCOUNTER (OUTPATIENT)
Dept: PHYSICAL THERAPY | Facility: OTHER | Age: 50
Setting detail: THERAPIES SERIES
End: 2020-09-21
Attending: PODIATRIST
Payer: COMMERCIAL

## 2020-09-21 DIAGNOSIS — S82.891A AVULSION FRACTURE OF RIGHT ANKLE, CLOSED, INITIAL ENCOUNTER: ICD-10-CM

## 2020-09-21 PROCEDURE — 97110 THERAPEUTIC EXERCISES: CPT | Mod: GP

## 2020-09-21 PROCEDURE — 97161 PT EVAL LOW COMPLEX 20 MIN: CPT | Mod: GP

## 2020-09-21 NOTE — PROGRESS NOTES
09/21/20 0900   General Information   Type of Visit Initial OP Ortho PT Evaluation   Start of Care Date 09/21/20   Referring Physician Denys Mendieta, DO   Orders Evaluate and Treat   Date of Order 09/17/20   Certification Required? No   Medical Diagnosis avulsion fracture of right ankle   Surgical/Medical history reviewed Yes   Body Part(s)   Body Part(s) Ankle/Foot   Presentation and Etiology   Pertinent history of current problem (include personal factors and/or comorbidities that impact the POC) Sprained right ankle 9/14/20, stood up from chair, ankle felt wobbly. Avulsion fracture right deltoid. History multiple sprains, bilat. Denies hip and back pain. Primary foot wear is birkenstocks.    Impairments A. Pain;C. Swelling;H. Impaired gait   Functional Limitations perform activities of daily living;perform desired leisure / sports activities   Symptom Location right ankle   Onset date of current episode/exacerbation 09/14/20   Chronicity New   Pain rating (0-10 point scale) Worst (/10)   Worst (/10) 2   Pain quality A. Sharp;B. Dull;C. Aching   Frequency of pain/symptoms A. Constant   Pain/symptoms exacerbated by B. Walking   Pain/symptoms eased by C. Rest   Progression of symptoms since onset: Improved   Prior Level of Function   Prior Level of Function-Mobility no limitations, was walking over 2 miles daily   Prior Level of Function-ADLs no limitations   Current Level of Function   Patient role/employment history A. Employed   Employment Comments desk work   Fall Risk Screen   Fall screen completed by PT   Have you fallen 2 or more times in the past year? No   Have you fallen and had an injury in the past year? No   Is patient a fall risk? No   Ankle/Foot Objective Findings   Observation mild edema right ankle, medial, lateral; mild bruising.    Gait/Locomotion noted weakness iwth gluteals, trendelenburg   Balance/ Proprioception (Single Leg Stance) poor bilateral   Ankle/Foot ROM Comment no limitations    Ankle/Foot Strength Comments no limitations   Ankle/Foot Flexibility Comments no limitations   Accessory Motion/Joint Mobility hyper mobility bilat ankles.    Planned Therapy Interventions   Planned Therapy Interventions balance training;gait training;manual therapy;neuromuscular re-education;ROM;strengthening   Planned Modality Interventions   Planned Modality Interventions Cryotherapy;Hot packs;Ultrasound   Clinical Impression   Criteria for Skilled Therapeutic Interventions Met yes, treatment indicated   PT Diagnosis muscle weakness, right ankle pain   Influenced by the following impairments pain, swelling limited gait   Functional limitations due to impairments walking due to recent right ankle sprain   Clinical Presentation Evolving/Changing   Clinical Decision Making (Complexity) Low complexity   Therapy Frequency 2 times/Week   Predicted Duration of Therapy Intervention (days/wks) 3 months   Risk & Benefits of therapy have been explained Yes   Patient, Family & other staff in agreement with plan of care Yes   Clinical Impression Comments Patient with new right ankle sprain due to hypermobility and weakness with hip musculature.    ORTHO GOALS   PT Ortho Eval Goals 1;2;3   Ortho Goal 1   Goal Identifier walk   Goal Description Patient to return to walking over 2 miles without right ankle pain.    Target Date 12/21/20   Ortho Goal 2   Goal Identifier MMT   Goal Description Patient to have improved MMT of gluteals to at least 4/5 to hlep stabilize ankles during gait and transfers.   Target Date 12/21/20   Ortho Goal 3   Goal Identifier HEP   Goal Description Patient to remain compliant with HEP to improve ankle stability with sit to stand.    Target Date 12/21/20   Total Evaluation Time   PT Eval, Low Complexity Minutes (10185) 15

## 2020-09-23 ENCOUNTER — HOSPITAL ENCOUNTER (OUTPATIENT)
Dept: PHYSICAL THERAPY | Facility: OTHER | Age: 50
Setting detail: THERAPIES SERIES
End: 2020-09-23
Attending: PODIATRIST
Payer: COMMERCIAL

## 2020-09-23 PROCEDURE — 97110 THERAPEUTIC EXERCISES: CPT | Mod: GP

## 2020-09-29 ENCOUNTER — HOSPITAL ENCOUNTER (OUTPATIENT)
Dept: PHYSICAL THERAPY | Facility: OTHER | Age: 50
Setting detail: THERAPIES SERIES
End: 2020-09-29
Attending: PODIATRIST
Payer: COMMERCIAL

## 2020-09-29 PROCEDURE — 97110 THERAPEUTIC EXERCISES: CPT | Mod: GP

## 2020-10-06 ENCOUNTER — HOSPITAL ENCOUNTER (OUTPATIENT)
Dept: PHYSICAL THERAPY | Facility: OTHER | Age: 50
Setting detail: THERAPIES SERIES
End: 2020-10-06
Attending: PODIATRIST
Payer: COMMERCIAL

## 2020-10-06 PROCEDURE — 97110 THERAPEUTIC EXERCISES: CPT | Mod: GP

## 2020-10-08 ENCOUNTER — OFFICE VISIT (OUTPATIENT)
Dept: ORTHOPEDICS | Facility: OTHER | Age: 50
End: 2020-10-08
Attending: PODIATRIST
Payer: COMMERCIAL

## 2020-10-08 DIAGNOSIS — S93.421A SPRAIN OF DELTOID LIGAMENT OF RIGHT ANKLE, INITIAL ENCOUNTER: Primary | ICD-10-CM

## 2020-10-08 PROCEDURE — 99212 OFFICE O/P EST SF 10 MIN: CPT | Performed by: PODIATRIST

## 2020-10-08 NOTE — PROGRESS NOTES
Patient is here for follow up on her right ankle.   Cailin Lua LPN .....................10/8/2020 9:22 AM

## 2020-10-08 NOTE — PROGRESS NOTES
Visit Date:   10/08/2020      Rose is here 3 weeks into therapy for an ankle sprain, doing much better.  Had her last therapy yesterday, she thinks.  Happy with where she is at.  She was told she has some lax ankles and certainly clinically I agree with this, but she is not having symptoms.  She is happy where she is.      PHYSICAL EXAMINATION:    CONSTITUTIONAL:  The patient is alert and oriented x3, well appearing and in no apparent distress.  Affect is pleasant and answers questions appropriately.   VASCULAR:  Circulation is intact with palpable pedal pulses and adequate capillary fill time to all digits.  Hair growth is present and appropriate to mid foot and digits.   NEUROLOGIC:  Light touch sensation is intact to digits.  There is a negative Tinel sign.  There are no signs of apparent nerve entrapment of superficial peroneal or common peroneal nerves.   INTEGUMENT:  No abnormal dermatologic lesions are noted.  Skin has normal texture and turgor.     MUSCULOSKELETAL:  Some laxity here with a drawer test.  Still has mild tenderness to palpate laterally, but overall this ankle feels good in comparison to where she was.  She was walking comfortably in sandals today.      ASSESSMENT:  Ankle sprain, improved with therapy and time.      PLAN:  I discussed progression of treatment.  If this continues to be a recurrent problem with sprains and instability, we may consider stabilization, which we discussed.  As long as she is happy with it and not having any significant symptoms, she will progress as able, otherwise follow up for further discussion.         IRINA MONDRAGON DPM             D: 10/08/2020   T: 10/08/2020   MT: ÁNGEL      Name:     ROSE PORRAS   MRN:      [id number]        Account:      [id number]   :      1970           Visit Date:   10/08/2020      Document: [id number]

## 2020-11-18 NOTE — PROGRESS NOTES
Outpatient Physical Therapy Discharge Note     Patient: Rose Mendez  : 1970    Beginning/End Dates of Reporting Period:  20 to 10/06/2020    Referring Provider: DO Gardenia Keita Diagnosis:    muscle weakness, right ankle pain          Client Self Report: ankle feels really good, easier to tolerate the day. going for walks, feels fine    Objective Measurements:           Goals:  Goal Identifier walk   Goal Description Patient to return to walking over 2 miles without right ankle pain.    Target Date 20   Date Met   10/6/20   Progress:     Goal Identifier MMT   Goal Description Patient to have improved MMT of gluteals to at least 4/5 to hlep stabilize ankles during gait and transfers.   Target Date 20   Date Met      Progress: progressing with HEP     Goal Identifier HEP   Goal Description Patient to remain compliant with HEP to improve ankle stability with sit to stand.    Target Date 20   Date Met   10/6/20   Progress:       Progress Toward Goals:   Progress this reporting period: Patient progressing well with ankle and mobility. Due to chronic instability, she may look in to surgery in the future.           Plan:  Discharge from therapy.    Discharge:    Reason for Discharge: Patient has met all goals.    Equipment Issued: NA    Discharge Plan: Patient to continue home program.

## 2021-03-08 ENCOUNTER — OFFICE VISIT (OUTPATIENT)
Dept: FAMILY MEDICINE | Facility: OTHER | Age: 51
End: 2021-03-08
Attending: FAMILY MEDICINE
Payer: COMMERCIAL

## 2021-03-08 ENCOUNTER — MYC MEDICAL ADVICE (OUTPATIENT)
Dept: FAMILY MEDICINE | Facility: OTHER | Age: 51
End: 2021-03-08

## 2021-03-08 VITALS
HEIGHT: 66 IN | BODY MASS INDEX: 28.7 KG/M2 | SYSTOLIC BLOOD PRESSURE: 132 MMHG | DIASTOLIC BLOOD PRESSURE: 78 MMHG | RESPIRATION RATE: 14 BRPM | OXYGEN SATURATION: 96 % | HEART RATE: 76 BPM | TEMPERATURE: 97.9 F | WEIGHT: 178.6 LBS

## 2021-03-08 DIAGNOSIS — L50.9 HIVES: Primary | ICD-10-CM

## 2021-03-08 PROCEDURE — 99212 OFFICE O/P EST SF 10 MIN: CPT | Performed by: PHYSICIAN ASSISTANT

## 2021-03-08 RX ORDER — FLUOXETINE 40 MG/1
CAPSULE ORAL
COMMUNITY
Start: 2021-02-15 | End: 2021-05-26

## 2021-03-08 ASSESSMENT — ANXIETY QUESTIONNAIRES

## 2021-03-08 ASSESSMENT — PATIENT HEALTH QUESTIONNAIRE - PHQ9
SUM OF ALL RESPONSES TO PHQ QUESTIONS 1-9: 0
5. POOR APPETITE OR OVEREATING: NOT AT ALL

## 2021-03-08 ASSESSMENT — PAIN SCALES - GENERAL: PAINLEVEL: NO PAIN (0)

## 2021-03-08 ASSESSMENT — MIFFLIN-ST. JEOR: SCORE: 1441.87

## 2021-03-08 NOTE — NURSING NOTE
Patient presents to clinic with a red rash like area that is a straight line on left side of abdomen. She is concerns that it might be shingles.  Susie Nicole LPN ....................  3/8/2021   3:45 PM

## 2021-03-08 NOTE — PROGRESS NOTES
Assessment & Plan     1. Hives  Symptoms and exam consistent with hives. Given improvement with Benadryl recommend repeat dosing as needed. Call or return to the clinic if symptoms persist, worsen, or new symptoms arise.      Return if symptoms worsen or fail to improve.    Sarika Jon PA-C  [facility] AND \[facility]\""    Subjective   Rose is a 50 year old who presents for the following health issues     HPI   Here for evaluation of rash that began last night. Started on her left hip region but also noticed rash near her right eye. Took Benadryl and that resolved area on her eye and is much improved on her hip region. Is not painful or itching. No new medications, foods, clothing, soaps, etc.       PAST MEDICAL HISTORY:   Past Medical History:   Diagnosis Date     ADHD (attention deficit hyperactivity disorder), combined type 11/25/2018    Diagnostic assessment completed by Lena Ram LP at [facility] psychological services 10/2018--see scans     Adjustment disorder with mixed anxiety and depressed mood 2/16/2018    Overview:  with mood disorder     Adverse effect of anesthetic     08/01/2005,Notes prior complications from anesthesia:  (08/01/2005) Nausea     Dependence on other enabling machines and devices     7/10/2015,Sleep studies and followup per Dr Alcantara 7/2015     Disturbance of skin sensation     left side, resolved. Normal MRI     Ganglion of wrist     9/3/2014     Injury of ulnar nerve of forearm     (left Dr Riggs     Nontoxic multinodular goiter     1/19/2015     Osteoarthritis of first carpometacarpal joint     8/27/2014     Other fracture of left lower leg, initial encounter for closed fracture     No Comments Provided     Personal history of other medical treatment (CODE)     20 c- sections     Vitamin D deficiency     2/13/2015       PAST SURGICAL HISTORY:   Past Surgical History:   Procedure Laterality Date     AS REVISE ULNAR NERVE AT WRIST Left 2005     2005,36197.0,[place] REVISE ULNAR NERVE AT WRIST,left, Riggs      SECTION            SECTION      ,with postpartum tubal sterilization Status post left elbow ulnar nerve transposition .     COLONOSCOPY N/A 3/1/2018    Procedure: COMBINED COLONOSCOPY, SINGLE OR MULTIPLE BIOPSY/POLYPECTOMY BY BIOPSY;  Colonoscopy;  Surgeon: Ramona Vegas MD;  Location: [facility] OR     HC ABLATION, ENDOMETRIAL, THERMAL, W/O HYSTEROSCOPIC GUIDANCE      5/4/10,254832,ABLATION,endometrial     HYSTERECTOMY VAGINAL      7/6/10,Laparoscopic Assisted Vaginal Hysterectomy       FAMILY HISTORY:   Family History   Problem Relation Age of Onset     Family History Negative Daughter         Good Health     Family History Negative Daughter         Good Health     Family History Negative Daughter         Good Health     Prostate Cancer Father         Cancer-prostate     Seizure Disorder Father         head injury     Hypertension Mother         Hypertension     Diabetes Maternal Grandmother         Diabetes     Heart Disease Paternal Grandfather 62        Heart Disease,MI      Coronary Artery Disease Paternal Grandfather 62        Coronary artery disease     Hypertension Maternal Aunt         Hypertension     Diabetes Maternal Aunt         Diabetes     Breast Cancer Maternal Aunt 70        Cancer-breast     Coronary Artery Disease Maternal Aunt 60        Coronary artery disease     Seizure Disorder Sister         congenital hypoxia     Thyroid Disease Sister         Thyroid Disease     Hypertension Maternal Uncle         Hypertension     Diabetes Maternal Uncle         Diabetes     Coronary Artery Disease Maternal Uncle 40        Coronary artery disease     Diabetes Paternal Aunt         Diabetes     Diabetes Paternal Uncle         Diabetes     Allergy (Severe) No family hx of         Allergies     Anesthesia Reaction No family hx of         Anesthesia Problem     Blood Disease No family hx of         Blood Disease     Colon  "Cancer No family hx of         Cancer-colon     Ovarian Cancer No family hx of         Cancer-ovarian     Other - See Comments No family hx of         Stroke       SOCIAL HISTORY:   Social History     Tobacco Use     Smoking status: Current Every Day Smoker     Packs/day: 1.00     Years: 30.00     Pack years: 30.00     Types: Cigarettes     Smokeless tobacco: Never Used     Tobacco comment: Quit smoking: occ - most days   Substance Use Topics     Alcohol use: Not Currently     Alcohol/week: 0.0 standard drinks     Frequency: Monthly or less     Comment: occ      No Known Allergies  Current Outpatient Medications   Medication     amphetamine-dextroamphetamine (ADDERALL XR) 20 MG 24 hr capsule     amphetamine-dextroamphetamine (ADDERALL) 5 MG tablet     Cholecalciferol (D 5000) 5000 UNITS TABS     FLUoxetine (PROZAC) 40 MG capsule     ibuprofen (ADVIL/MOTRIN) 600 MG tablet     No current facility-administered medications for this visit.          Review of Systems         Objective    /78   Pulse 76   Temp 97.9  F (36.6  C)   Resp 14   Ht 1.676 m (5' 6\")   Wt 81 kg (178 lb 9.6 oz)   LMP  (LMP Unknown)   SpO2 96%   Breastfeeding No   BMI 28.83 kg/m    Body mass index is 28.83 kg/m .  Physical Exam   General: Pleasant, in no apparent distress.  Skin: Very mild streak of red along right hip region near pant line. Upon review of image on patient's phone it appears it was more wheal like last night. No signs of infection. No vesicles.   Psych: Appropriate mood and affect.            "

## 2021-03-08 NOTE — TELEPHONE ENCOUNTER
Noted Pt scheduled appointment to see Sarika Jon today:    Next 5 appointments (look out 90 days)    Mar 08, 2021  3:40 PM  SHORT with Sarika Jon PA-C  [facility] ([facility] ) [address]  [place] [address]  [phone]        Appointment was scheduled after [employer] message was sent. Reply to Pt asking her to let us know if she any further concerns. Adeola Perez RN .............. 3/8/2021  11:01 AM

## 2021-03-09 ASSESSMENT — ANXIETY QUESTIONNAIRES: GAD7 TOTAL SCORE: 0

## 2021-03-12 ENCOUNTER — IMMUNIZATION (OUTPATIENT)
Dept: FAMILY MEDICINE | Facility: OTHER | Age: 51
End: 2021-03-12
Attending: FAMILY MEDICINE
Payer: COMMERCIAL

## 2021-03-12 PROCEDURE — 91300 PR COVID VAC PFIZER DIL RECON 30 MCG/0.3 ML IM: CPT

## 2021-03-12 PROCEDURE — 0001A PR COVID VAC PFIZER DIL RECON 30 MCG/0.3 ML IM: CPT

## 2021-04-02 ENCOUNTER — IMMUNIZATION (OUTPATIENT)
Dept: FAMILY MEDICINE | Facility: OTHER | Age: 51
End: 2021-04-02
Attending: FAMILY MEDICINE
Payer: COMMERCIAL

## 2021-04-02 PROCEDURE — 91300 PR COVID VAC PFIZER DIL RECON 30 MCG/0.3 ML IM: CPT

## 2021-04-02 PROCEDURE — 0002A PR COVID VAC PFIZER DIL RECON 30 MCG/0.3 ML IM: CPT

## 2021-04-22 ENCOUNTER — VIRTUAL VISIT (OUTPATIENT)
Dept: INTERNAL MEDICINE | Facility: OTHER | Age: 51
End: 2021-04-22
Attending: NURSE PRACTITIONER
Payer: COMMERCIAL

## 2021-04-22 ENCOUNTER — MYC MEDICAL ADVICE (OUTPATIENT)
Dept: INTERNAL MEDICINE | Facility: OTHER | Age: 51
End: 2021-04-22

## 2021-04-22 DIAGNOSIS — R39.89 URINARY PROBLEM: Primary | ICD-10-CM

## 2021-04-22 DIAGNOSIS — N30.01 ACUTE CYSTITIS WITH HEMATURIA: ICD-10-CM

## 2021-04-22 LAB
ALBUMIN UR-MCNC: NEGATIVE MG/DL
APPEARANCE UR: CLEAR
BILIRUB UR QL STRIP: NEGATIVE
COLOR UR AUTO: YELLOW
GLUCOSE UR STRIP-MCNC: NEGATIVE MG/DL
HGB UR QL STRIP: ABNORMAL
KETONES UR STRIP-MCNC: NEGATIVE MG/DL
LEUKOCYTE ESTERASE UR QL STRIP: ABNORMAL
NITRATE UR QL: NEGATIVE
PH UR STRIP: 6.5 PH (ref 5–9)
RBC #/AREA URNS AUTO: ABNORMAL /HPF
SOURCE: ABNORMAL
SP GR UR STRIP: <1.005 (ref 1–1.03)
UROBILINOGEN UR STRIP-ACNC: 0.2 EU/DL (ref 0.2–1)
WBC #/AREA URNS AUTO: ABNORMAL /HPF

## 2021-04-22 PROCEDURE — 99202 OFFICE O/P NEW SF 15 MIN: CPT | Mod: 95 | Performed by: NURSE PRACTITIONER

## 2021-04-22 PROCEDURE — 87088 URINE BACTERIA CULTURE: CPT | Mod: ZL | Performed by: NURSE PRACTITIONER

## 2021-04-22 PROCEDURE — 87086 URINE CULTURE/COLONY COUNT: CPT | Mod: ZL | Performed by: NURSE PRACTITIONER

## 2021-04-22 PROCEDURE — 81001 URINALYSIS AUTO W/SCOPE: CPT | Mod: ZL,GT | Performed by: NURSE PRACTITIONER

## 2021-04-22 RX ORDER — NITROFURANTOIN 25; 75 MG/1; MG/1
100 CAPSULE ORAL 2 TIMES DAILY
Qty: 10 CAPSULE | Refills: 0 | Status: SHIPPED | OUTPATIENT
Start: 2021-04-22 | End: 2021-04-27

## 2021-04-22 NOTE — PROGRESS NOTES
"Rose is a 50 year old who is being evaluated via a billable video visit.      How would you like to obtain your AVS? MyChart  If the video visit is dropped, the invitation should be resent by: Send to e-mail at: [email or web address]  Will anyone else be joining your video visit? No    Video Start Time: 9:26 AM    Assessment & Plan     Urinary problem  - UA with Microscopic reflex to Culture  - Exam and UA consistent with cystitis.  - Will add on urine culture and adjust treatment as indicated per results of culture.  - Will treat with antibiotics; encouraged intake of probiotics/yogurt while on antibiotics and for 1 week after.  - Educated on prevention including urination after intercourse, avoidance of bubble baths, douching, scented underware/pads/cosmetics and good tiburcio-care (wiping front to back).  - May drink cranberry juice to help prevent symptoms.  - Return/call as needed for follow-up should any new symptoms develop, for worsening of current symptoms or if symptoms do not resolve with above plan.      15 minutes spent on the date of the encounter doing chart review, history and exam, documentation and further activities per the note     Tobacco Cessation:   reports that she has been smoking cigarettes. She has a 30.00 pack-year smoking history. She has never used smokeless tobacco.    BMI:   Estimated body mass index is 28.83 kg/m  as calculated from the following:    Height as of 3/8/21: 1.676 m (5' 6\").    Weight as of 3/8/21: 81 kg (178 lb 9.6 oz).     Return if symptoms worsen or fail to improve.    Chantelle Frank NP  [facility] AND \[facility] [place] [facility]\""          Subjective   Rose is a 50 year old who presents for the following health issues     Urinary symptoms    Patient reports urinary frequency, cloudiness of urine, pain with voiding, feeling like she cannot empty her bladder and decreased urine.         Review of Systems   CONSTITUTIONAL: NEGATIVE for fever, chills, change in weight  ENT/MOUTH: " NEGATIVE for ear, mouth and throat problems  RESP: NEGATIVE for significant cough or SOB  CV: NEGATIVE for chest pain, palpitations or peripheral edema  : Frequency, pain with voiding, decreased urine, feeling like she cannot empty her bladder and cloudiness of urine      Objective       Vitals:  No vitals were obtained today due to virtual visit.    Physical Exam   GENERAL: Healthy, alert and no distress  EYES: Eyes grossly normal to inspection.  No discharge or erythema, or obvious scleral/conjunctival abnormalities.  RESP: No audible wheeze, cough, or visible cyanosis.  No visible retractions or increased work of breathing.    SKIN: Visible skin clear. No significant rash, abnormal pigmentation or lesions.  NEURO: Cranial nerves grossly intact.  Mentation and speech appropriate for age.  PSYCH: Mentation appears normal, affect normal/bright, judgement and insight intact, normal speech and appearance well-groomed.    Results for orders placed or performed in visit on 04/22/21   Urine Culture Aerobic Bacterial     Status: Abnormal    Specimen: Urine clean catch; Midstream Urine   Result Value Ref Range    Specimen Description Midstream Urine     Culture Micro 10,000 to 50,000 colonies/mL  Escherichia coli   (A)        Susceptibility    Escherichia coli - DONNIE     Amoxicillin/Clav 16/8 Intermediate ug/mL     AMPICILLIN >16 Resistant ug/mL     AMPICILLIN/SULBACTAM >16/8 Resistant ug/mL     AZTREONAM <=4 Sensitive ug/mL     CEFAZOLIN* <=2 Sensitive ug/mL      * Cefazolin DONNIE breakpoints are for the treatment of uncomplicated urinary tract infections.  For the treatment of systemic infections, please contact the laboratory for additional testing.     CEFEPIME <=2 Sensitive ug/mL     CEFOXITIN <=8 Sensitive ug/mL     Cefpodoxime <=2 Sensitive ug/mL     CEFTAZIDIME <=1 Sensitive ug/mL     CEFTRIAXONE <=1 Sensitive ug/mL     CEFUROXIME <=4 Sensitive ug/mL     CIPROFLOXACIN <=1 Sensitive ug/mL     ERTAPENEM <=0.5  Sensitive ug/mL     GENTAMICIN <=4 Sensitive ug/mL     IMIPENEM <=1 Sensitive ug/mL     LEVOFLOXACIN <=2 Sensitive ug/mL     NITROFURANTOIN <=32 Sensitive ug/mL     Piperacillin/Tazo <=16 Sensitive ug/mL     TETRACYCLINE <=4 Sensitive ug/mL     TOBRAMYCIN <=4 Sensitive ug/mL     TRIMETHOPRIM Value in next row Sensitive ug/mL      <=8     Trimethoprim/Sulfa Value in next row Sensitive ug/mL      <=8   Results for orders placed or performed in visit on 04/22/21   UA with Microscopic reflex to Culture     Status: Abnormal    Specimen: Midstream Urine   Result Value Ref Range    Color Urine Yellow     Appearance Urine Clear     Glucose Urine Negative NEG^Negative mg/dL    Bilirubin Urine Negative NEG^Negative    Ketones Urine Negative NEG^Negative mg/dL    Specific Gravity Urine <1.005 1.000 - 1.030    pH Urine 6.5 5.0 - 9.0 pH    Protein Albumin Urine Negative NEG^Negative mg/dL    Urobilinogen Urine 0.2 0.2 - 1.0 EU/dL    Nitrite Urine Negative NEG^Negative    Blood Urine Large (A) NEG^Negative    Leukocyte Esterase Urine Small (A) NEG^Negative    Source Midstream Urine     WBC Urine 0 - 5 OTO5^0 - 5 /HPF    RBC Urine 10-25 (A) OTO2^O - 2 /HPF     Video-Visit Details    Type of service:  Video Visit    Video End Time:0926    Originating Location (pt. Location): Home    Distant Location (provider location):  [facility]     Platform used for Video Visit: [employer]

## 2021-04-22 NOTE — NURSING NOTE
Chief Complaint   Patient presents with     UTI   patient presents for a video visit today for a bladder infection. Symptoms started 2 weeks ago pressure. Yesterday frequency started and urine cloudy.    Medication Reconciliation: completed   Marilynn Rdz LPN  4/22/2021 9:09 AM

## 2021-04-24 LAB
BACTERIA SPEC CULT: ABNORMAL
SPECIMEN SOURCE: ABNORMAL

## 2021-05-27 ENCOUNTER — OFFICE VISIT (OUTPATIENT)
Dept: FAMILY MEDICINE | Facility: OTHER | Age: 51
End: 2021-05-27
Attending: FAMILY MEDICINE
Payer: COMMERCIAL

## 2021-05-27 VITALS
HEART RATE: 88 BPM | WEIGHT: 178.6 LBS | RESPIRATION RATE: 18 BRPM | SYSTOLIC BLOOD PRESSURE: 136 MMHG | DIASTOLIC BLOOD PRESSURE: 98 MMHG | OXYGEN SATURATION: 97 % | BODY MASS INDEX: 28.83 KG/M2 | TEMPERATURE: 97.8 F

## 2021-05-27 DIAGNOSIS — F17.200 SMOKER: ICD-10-CM

## 2021-05-27 DIAGNOSIS — M79.604 PAIN IN BOTH LOWER EXTREMITIES: ICD-10-CM

## 2021-05-27 DIAGNOSIS — R60.9 EDEMA, UNSPECIFIED TYPE: Primary | ICD-10-CM

## 2021-05-27 DIAGNOSIS — M79.605 PAIN IN BOTH LOWER EXTREMITIES: ICD-10-CM

## 2021-05-27 LAB
ANION GAP SERPL CALCULATED.3IONS-SCNC: 8 MMOL/L (ref 3–14)
BUN SERPL-MCNC: 8 MG/DL (ref 7–25)
CALCIUM SERPL-MCNC: 9.6 MG/DL (ref 8.6–10.3)
CHLORIDE SERPL-SCNC: 102 MMOL/L (ref 98–107)
CO2 SERPL-SCNC: 27 MMOL/L (ref 21–31)
CREAT SERPL-MCNC: 0.82 MG/DL (ref 0.6–1.2)
GFR SERPL CREATININE-BSD FRML MDRD: 74 ML/MIN/{1.73_M2}
GLUCOSE SERPL-MCNC: 93 MG/DL (ref 70–105)
POTASSIUM SERPL-SCNC: 4.1 MMOL/L (ref 3.5–5.1)
SODIUM SERPL-SCNC: 137 MMOL/L (ref 134–144)

## 2021-05-27 PROCEDURE — 36415 COLL VENOUS BLD VENIPUNCTURE: CPT | Mod: ZL | Performed by: FAMILY MEDICINE

## 2021-05-27 PROCEDURE — 80048 BASIC METABOLIC PNL TOTAL CA: CPT | Mod: ZL | Performed by: FAMILY MEDICINE

## 2021-05-27 PROCEDURE — 99213 OFFICE O/P EST LOW 20 MIN: CPT | Performed by: FAMILY MEDICINE

## 2021-05-27 ASSESSMENT — PATIENT HEALTH QUESTIONNAIRE - PHQ9
5. POOR APPETITE OR OVEREATING: SEVERAL DAYS
SUM OF ALL RESPONSES TO PHQ QUESTIONS 1-9: 1

## 2021-05-27 ASSESSMENT — ANXIETY QUESTIONNAIRES
1. FEELING NERVOUS, ANXIOUS, OR ON EDGE: NOT AT ALL
IF YOU CHECKED OFF ANY PROBLEMS ON THIS QUESTIONNAIRE, HOW DIFFICULT HAVE THESE PROBLEMS MADE IT FOR YOU TO DO YOUR WORK, TAKE CARE OF THINGS AT HOME, OR GET ALONG WITH OTHER PEOPLE: NOT DIFFICULT AT ALL
6. BECOMING EASILY ANNOYED OR IRRITABLE: SEVERAL DAYS
7. FEELING AFRAID AS IF SOMETHING AWFUL MIGHT HAPPEN: NOT AT ALL
GAD7 TOTAL SCORE: 3
5. BEING SO RESTLESS THAT IT IS HARD TO SIT STILL: SEVERAL DAYS
3. WORRYING TOO MUCH ABOUT DIFFERENT THINGS: NOT AT ALL
2. NOT BEING ABLE TO STOP OR CONTROL WORRYING: NOT AT ALL

## 2021-05-27 ASSESSMENT — PAIN SCALES - GENERAL: PAINLEVEL: MILD PAIN (3)

## 2021-05-27 NOTE — NURSING NOTE
Patient is here for bilateral swelling in hands, feet and legs. States has been on going for a few months but has been getting worse. Is having pain in legs and hands. No SOB, some days are better than others but time of day does not change how the swelling is. States right leg is worse.     No LMP recorded (lmp unknown). Patient has had a hysterectomy.  Medication Reconciliation: complete    Kait Bourne LPN  5/27/2021 3:00 PM

## 2021-05-27 NOTE — PROGRESS NOTES
a  SUBJECTIVE:   Rose Mendez is a 50 year old female who presents to clinic today for the following health issues:    Nursing Notes:   Kait Bourne LPN  5/27/2021  3:23 PM  Signed  Patient is here for bilateral swelling in hands, feet and legs. States has been on going for a few months but has been getting worse. Is having pain in legs and hands. No SOB, some days are better than others but time of day does not change how the swelling is. States right leg is worse.     No LMP recorded (lmp unknown). Patient has had a hysterectomy.  Medication Reconciliation: complete    Kait Bourne LPN  5/27/2021 3:00 PM        HPI    50-year-old female presents with bilateral lower extremity swelling and pain for many months.  Swelling in ankles, pain in calves.Denies shortness of breath, chest pain or orthopnea.  Also describes pain in both posterior calves when walking.  She gets frequent cramps.    Patient is a smoker.    No previous DVT PE.  Patient Active Problem List    Diagnosis Date Noted     ADHD (attention deficit hyperactivity disorder), combined type 11/25/2018     Priority: Medium     Diagnostic assessment completed by Lena Ram LP at [facility] psychological services 10/2018--see scans       Adjustment disorder with mixed anxiety and depressed mood 02/16/2018     Priority: Medium     Diagnostic assessment final impression--[facility] psychological services Lena Licona LP--10/2018       Tobacco abuse 02/16/2018     Priority: Medium     Tobacco dependence 02/10/2016     Priority: Medium     Status post finger joint fusion 12/15/2015     Priority: Medium     MERCEDES on CPAP 07/10/2015     Priority: Medium     Overview:   Sleep studies and followup per Dr Alcantara 7/2015       History of hysterectomy 02/25/2015     Priority: Medium     Vitamin D deficiency 02/13/2015     Priority: Medium     Snoring 02/12/2015     Priority: Medium     Multiple thyroid nodules 01/19/2015      Priority: Medium     Ganglion cyst of wrist 09/03/2014     Priority: Medium     CMC arthritis, thumb, degenerative 08/27/2014     Priority: Medium     Past Medical History:   Diagnosis Date     ADHD (attention deficit hyperactivity disorder), combined type 11/25/2018    Diagnostic assessment completed by Lena Ram LP at [facility] psychological services 10/2018--see scans     Adjustment disorder with mixed anxiety and depressed mood 2/16/2018    Overview:  with mood disorder     Adverse effect of anesthetic     08/01/2005,Notes prior complications from anesthesia:  (08/01/2005) Nausea     Dependence on other enabling machines and devices     7/10/2015,Sleep studies and followup per Dr Alcantara 7/2015     Disturbance of skin sensation     left side, resolved. Normal MRI     Ganglion of wrist     9/3/2014     Injury of ulnar nerve of forearm     (left Dr iRggs     Nontoxic multinodular goiter     1/19/2015     Osteoarthritis of first carpometacarpal joint     8/27/2014     Other fracture of left lower leg, initial encounter for closed fracture     No Comments Provided     Personal history of other medical treatment (CODE)     20 c- sections     Vitamin D deficiency     2/13/2015      Current Outpatient Medications   Medication Sig Dispense Refill     amphetamine-dextroamphetamine (ADDERALL XR) 20 MG 24 hr capsule Take 1 capsule by mouth every morning       amphetamine-dextroamphetamine (ADDERALL) 5 MG tablet Take 1 tablet by mouth daily Every early afternoon       Cholecalciferol (D 5000) 5000 UNITS TABS Take 5,000 Units by mouth daily (Patient taking differently: Take 5,000 Units by mouth every 72 hours ) 30 tablet 2       Review of Systems     OBJECTIVE:     BP (!) 136/98 (BP Location: Right arm, Patient Position: Sitting, Cuff Size: Adult Large)   Pulse 88   Temp 97.8  F (36.6  C) (Tympanic)   Resp 18   Wt 81 kg (178 lb 9.6 oz)   LMP  (LMP Unknown)   SpO2 97%   Breastfeeding No   BMI  28.83 kg/m    Body mass index is 28.83 kg/m .  Physical Exam  Musculoskeletal:      Comments: Trace edema in ankles.  Cap refill normal.  Dorsalis pedis pulse is present bilaterally but diminished.    Calves nontender.    Minimal varicosities   Neurological:      Mental Status: She is alert.         Diagnostic Test Results:  Results for orders placed or performed in visit on 05/27/21   Basic Metabolic Panel     Status: None   Result Value Ref Range    Sodium 137 134 - 144 mmol/L    Potassium 4.1 3.5 - 5.1 mmol/L    Chloride 102 98 - 107 mmol/L    Carbon Dioxide 27 21 - 31 mmol/L    Anion Gap 8 3 - 14 mmol/L    Glucose 93 70 - 105 mg/dL    Urea Nitrogen 8 7 - 25 mg/dL    Creatinine 0.82 0.60 - 1.20 mg/dL    GFR Estimate 74 >60 mL/min/[1.73_m2]    GFR Estimate If Black 89 >60 mL/min/[1.73_m2]    Calcium 9.6 8.6 - 10.3 mg/dL         ASSESSMENT/PLAN:           ICD-10-CM    1. Edema, unspecified type  R60.9 Basic Metabolic Panel     Basic Metabolic Panel     US GABBIE Doppler No Exercise 1-2 Levels Bilateral   2. Pain in both lower extremities  M79.604 US GABBIE Doppler No Exercise 1-2 Levels Bilateral    M79.605    3. Smoker  F17.200 US GABBIE Doppler No Exercise 1-2 Levels Bilateral       Kidney function normal.  Likely dependent edema.  Rule out peripheral arterial disease in a smoker with diminished pulses.      Nena Stafford MD  [facility] AND [facility]

## 2021-05-28 ASSESSMENT — ANXIETY QUESTIONNAIRES: GAD7 TOTAL SCORE: 3

## 2021-06-07 ENCOUNTER — HOSPITAL ENCOUNTER (OUTPATIENT)
Dept: ULTRASOUND IMAGING | Facility: OTHER | Age: 51
Discharge: HOME | End: 2021-06-07
Attending: FAMILY MEDICINE | Admitting: FAMILY MEDICINE
Payer: COMMERCIAL

## 2021-06-07 DIAGNOSIS — R60.9 EDEMA, UNSPECIFIED TYPE: ICD-10-CM

## 2021-06-07 DIAGNOSIS — M79.604 PAIN IN BOTH LOWER EXTREMITIES: ICD-10-CM

## 2021-06-07 DIAGNOSIS — F17.200 SMOKER: ICD-10-CM

## 2021-06-07 DIAGNOSIS — M79.605 PAIN IN BOTH LOWER EXTREMITIES: ICD-10-CM

## 2021-06-07 PROCEDURE — 93922 UPR/L XTREMITY ART 2 LEVELS: CPT

## 2021-06-08 ENCOUNTER — MYC MEDICAL ADVICE (OUTPATIENT)
Dept: FAMILY MEDICINE | Facility: OTHER | Age: 51
End: 2021-06-08

## 2021-06-08 NOTE — TELEPHONE ENCOUNTER
She has high readings that can be associated with elevated amounts of calcifications in the arteries.  This can be associated with peripheral vascular disease, diabetes, kidney disease, nicotine use, and increased calcification in other arteries, including coronary arteries.    I would recommend follow up with Koffi Stafford MD or cardiology to discuss the next steps in evaluation and treatment.  Barbara Gaston MD

## 2021-06-08 NOTE — TELEPHONE ENCOUNTER
Patient had a GABBIE ultra sound and is wondering what the results are and what it means. Her provider is out for a couple weeks. Rupali Rucker LPN ....................6/8/2021  4:57 PM

## 2021-06-18 ENCOUNTER — OFFICE VISIT (OUTPATIENT)
Dept: FAMILY MEDICINE | Facility: OTHER | Age: 51
End: 2021-06-18
Attending: FAMILY MEDICINE
Payer: COMMERCIAL

## 2021-06-18 ENCOUNTER — HOSPITAL ENCOUNTER (OUTPATIENT)
Dept: GENERAL RADIOLOGY | Facility: OTHER | Age: 51
End: 2021-06-18
Attending: FAMILY MEDICINE
Payer: COMMERCIAL

## 2021-06-18 ENCOUNTER — MYC MEDICAL ADVICE (OUTPATIENT)
Dept: FAMILY MEDICINE | Facility: OTHER | Age: 51
End: 2021-06-18

## 2021-06-18 VITALS
RESPIRATION RATE: 20 BRPM | SYSTOLIC BLOOD PRESSURE: 132 MMHG | WEIGHT: 176.8 LBS | BODY MASS INDEX: 28.54 KG/M2 | TEMPERATURE: 98.6 F | DIASTOLIC BLOOD PRESSURE: 86 MMHG | OXYGEN SATURATION: 95 % | HEART RATE: 84 BPM

## 2021-06-18 DIAGNOSIS — M54.10 BACK PAIN WITH RIGHT-SIDED RADICULOPATHY: ICD-10-CM

## 2021-06-18 DIAGNOSIS — R68.89 ABNORMAL ANKLE BRACHIAL INDEX: ICD-10-CM

## 2021-06-18 DIAGNOSIS — I10 HYPERTENSION, UNSPECIFIED TYPE: Primary | ICD-10-CM

## 2021-06-18 DIAGNOSIS — R68.89 ABNORMAL ANKLE BRACHIAL INDEX (ABI): Primary | ICD-10-CM

## 2021-06-18 PROCEDURE — 72100 X-RAY EXAM L-S SPINE 2/3 VWS: CPT

## 2021-06-18 PROCEDURE — 99214 OFFICE O/P EST MOD 30 MIN: CPT | Performed by: FAMILY MEDICINE

## 2021-06-18 RX ORDER — HYDROCHLOROTHIAZIDE 25 MG/1
25 TABLET ORAL DAILY
Qty: 30 TABLET | Refills: 0 | Status: SHIPPED | OUTPATIENT
Start: 2021-06-18 | End: 2021-07-15

## 2021-06-18 ASSESSMENT — ENCOUNTER SYMPTOMS
WEAKNESS: 0
BACK PAIN: 1
JOINT SWELLING: 0
FATIGUE: 0
NECK PAIN: 0
PARESTHESIAS: 1

## 2021-06-18 ASSESSMENT — PAIN SCALES - GENERAL: PAINLEVEL: MILD PAIN (2)

## 2021-06-18 NOTE — PATIENT INSTRUCTIONS
Start hydrochlorothiazide 25 mg in the morning, this should help with swelling as well as lower Blood pressure    Need repeat BP check and labs in 3-4 weeks    Wear wrist splint at night for possible carpal tunnel    Check back xray today and schedule EMG (nerve conduction study)

## 2021-06-18 NOTE — NURSING NOTE
Patient is here to follow up on recent testing. Patient had ultra sound 6/7/21. Is still having symptoms.     No LMP recorded (lmp unknown). Patient has had a hysterectomy.  Medication Reconciliation: felipe Bourne LPN  6/18/2021 2:05 PM

## 2021-06-18 NOTE — PROGRESS NOTES
SUBJECTIVE:   Rose Mendez is a 50 year old female who presents to clinic today for the following health issues:  Nursing Notes:   Kait Bourne LPN  6/18/2021  2:07 PM  Sign at exiting of workspace  Patient is here to follow up on recent testing. Patient had ultra sound 6/7/21. Is still having symptoms.     No LMP recorded (lmp unknown). Patient has had a hysterectomy.  Medication Reconciliation: felipe Bourne LPN  6/18/2021 2:05 PM        HPI  50-year-old female presents for recheck on lower extremity discomfort.  When I last saw her she was describing pain in both posterior calves and swelling in her feet.  Right sided leg pain, very sharp located in right lateral  Patient Active Problem List    Diagnosis Date Noted     ADHD (attention deficit hyperactivity disorder), combined type 11/25/2018     Priority: Medium     Diagnostic assessment completed by Lena Ram LP at [facility] psychological services 10/2018--see scans       Adjustment disorder with mixed anxiety and depressed mood 02/16/2018     Priority: Medium     Diagnostic assessment final impression--[facility] psychological services Lena Licona LP--10/2018       Tobacco abuse 02/16/2018     Priority: Medium     Tobacco dependence 02/10/2016     Priority: Medium     Status post finger joint fusion 12/15/2015     Priority: Medium     MERCEDES on CPAP 07/10/2015     Priority: Medium     Overview:   Sleep studies and followup per Dr Alcantara 7/2015       History of hysterectomy 02/25/2015     Priority: Medium     Vitamin D deficiency 02/13/2015     Priority: Medium     Snoring 02/12/2015     Priority: Medium     Multiple thyroid nodules 01/19/2015     Priority: Medium     Ganglion cyst of wrist 09/03/2014     Priority: Medium     CMC arthritis, thumb, degenerative 08/27/2014     Priority: Medium     Past Medical History:   Diagnosis Date     ADHD (attention deficit hyperactivity disorder), combined type  11/25/2018    Diagnostic assessment completed by Lena Ram LP at [facility] psychological services 10/2018--see scans     Adjustment disorder with mixed anxiety and depressed mood 2/16/2018    Overview:  with mood disorder     Adverse effect of anesthetic     08/01/2005,Notes prior complications from anesthesia:  (08/01/2005) Nausea     Dependence on other enabling machines and devices     7/10/2015,Sleep studies and followup per Dr Alcantara 7/2015     Disturbance of skin sensation     left side, resolved. Normal MRI     Ganglion of wrist     9/3/2014     Injury of ulnar nerve of forearm     (left Dr Keely     Nontoxic multinodular goiter     1/19/2015     Osteoarthritis of first carpometacarpal joint     8/27/2014     Other fracture of left lower leg, initial encounter for closed fracture     No Comments Provided     Personal history of other medical treatment (CODE)     20 c- sections     Vitamin D deficiency     2/13/2015        Review of Systems   Constitutional: Negative for fatigue.   Musculoskeletal: Positive for back pain. Negative for gait problem, joint swelling and neck pain.   Neurological: Positive for paresthesias. Negative for weakness.        OBJECTIVE:     BP (!) 136/90 (BP Location: Right arm, Patient Position: Sitting, Cuff Size: Adult Large)   Pulse 84   Temp 98.6  F (37  C) (Tympanic)   Resp 20   Wt 80.2 kg (176 lb 12.8 oz)   LMP  (LMP Unknown)   SpO2 95%   Breastfeeding No   BMI 28.54 kg/m    Body mass index is 28.54 kg/m .  Physical Exam  Cardiovascular:      Rate and Rhythm: Normal rate.      Pulses: Normal pulses.      Heart sounds: No murmur.   Pulmonary:      Effort: Pulmonary effort is normal. No respiratory distress.   Musculoskeletal:         General: No swelling.      Lumbar back: Normal. She exhibits normal range of motion, no tenderness and no bony tenderness.      Comments: SLR negative   Neurological:      Mental Status: She is alert.         Diagnostic  Test Results:  No results found for this or any previous visit (from the past 24 hour(s)).    ASSESSMENT/PLAN:         ICD-10-CM    1. Hypertension, unspecified type  I10 hydrochlorothiazide (HYDRODIURIL) 25 MG tablet   2. Back pain with right-sided radiculopathy  M54.10 XR Lumbar Spine 2/3 Views     NEUROLOGY ADULT REFERRAL   3. Abnormal ankle brachial index  R68.89     elevated GABBIE     Different history today with mostly unilateral symptoms in right leg, L4 dermatome. Lumbar xray today and EMG    Reviewed GABBIE with radiology. Needs improved control of vascular risk factors. She was referred to cardiology in my absence.  Seems reasonable.  Recommend starting statin medication and smoking cessation.    Blood pressure remains above goal.  Complains of lower extremity edema.  Will start low-dose check for baseline.     patient information:    The arteries were very stiff (calcified walls) so the test is not very helpful. It does mean you need more aggressive control of risk factors for vascular disease....you need to quit smoking and we should start you on something to lower cholesterol based on results from this past fall.     I think it is reasonable to see cardiology as Dr Simpson had suggested.     I am sorry for the confusion.  We can address again when I see you in 3-4 weeks for BP medication     MD Nena Davidson MD  [facility] AND [facility]

## 2021-06-21 ENCOUNTER — MYC MEDICAL ADVICE (OUTPATIENT)
Dept: FAMILY MEDICINE | Facility: OTHER | Age: 51
End: 2021-06-21

## 2021-06-24 ENCOUNTER — MYC MEDICAL ADVICE (OUTPATIENT)
Dept: FAMILY MEDICINE | Facility: OTHER | Age: 51
End: 2021-06-24

## 2021-07-09 ENCOUNTER — OFFICE VISIT (OUTPATIENT)
Dept: FAMILY MEDICINE | Facility: OTHER | Age: 51
End: 2021-07-09
Attending: FAMILY MEDICINE
Payer: COMMERCIAL

## 2021-07-09 VITALS
WEIGHT: 176 LBS | BODY MASS INDEX: 28.41 KG/M2 | SYSTOLIC BLOOD PRESSURE: 126 MMHG | RESPIRATION RATE: 16 BRPM | OXYGEN SATURATION: 97 % | HEART RATE: 72 BPM | TEMPERATURE: 97.8 F | DIASTOLIC BLOOD PRESSURE: 86 MMHG

## 2021-07-09 DIAGNOSIS — R20.2 PARESTHESIA: Primary | ICD-10-CM

## 2021-07-09 DIAGNOSIS — I73.9 BILATERAL CLAUDICATION OF LOWER LIMB (H): ICD-10-CM

## 2021-07-09 LAB
BASOPHILS # BLD AUTO: 0.1 10E9/L (ref 0–0.2)
BASOPHILS NFR BLD AUTO: 0.7 %
DIFFERENTIAL METHOD BLD: ABNORMAL
EOSINOPHIL # BLD AUTO: 0.2 10E9/L (ref 0–0.7)
EOSINOPHIL NFR BLD AUTO: 2.2 %
ERYTHROCYTE [DISTWIDTH] IN BLOOD BY AUTOMATED COUNT: 13.7 % (ref 10–15)
HCT VFR BLD AUTO: 45.3 % (ref 35–47)
HGB BLD-MCNC: 14.5 G/DL (ref 11.7–15.7)
IMM GRANULOCYTES # BLD: 0 10E9/L (ref 0–0.4)
IMM GRANULOCYTES NFR BLD: 0.3 %
LYMPHOCYTES # BLD AUTO: 2.6 10E9/L (ref 0.8–5.3)
LYMPHOCYTES NFR BLD AUTO: 38.8 %
MCH RBC QN AUTO: 26.9 PG (ref 26.5–33)
MCHC RBC AUTO-ENTMCNC: 32 G/DL (ref 31.5–36.5)
MCV RBC AUTO: 84 FL (ref 78–100)
MONOCYTES # BLD AUTO: 0.6 10E9/L (ref 0–1.3)
MONOCYTES NFR BLD AUTO: 8.2 %
NEUTROPHILS # BLD AUTO: 3.4 10E9/L (ref 1.6–8.3)
NEUTROPHILS NFR BLD AUTO: 49.8 %
PLATELET # BLD AUTO: 228 10E9/L (ref 150–450)
RBC # BLD AUTO: 5.4 10E12/L (ref 3.8–5.2)
VIT B12 SERPL-MCNC: 252 PG/ML (ref 180–914)
WBC # BLD AUTO: 6.8 10E9/L (ref 4–11)

## 2021-07-09 PROCEDURE — 82607 VITAMIN B-12: CPT | Mod: ZL | Performed by: FAMILY MEDICINE

## 2021-07-09 PROCEDURE — 85025 COMPLETE CBC W/AUTO DIFF WBC: CPT | Mod: ZL | Performed by: FAMILY MEDICINE

## 2021-07-09 PROCEDURE — 99213 OFFICE O/P EST LOW 20 MIN: CPT | Performed by: FAMILY MEDICINE

## 2021-07-09 PROCEDURE — 36415 COLL VENOUS BLD VENIPUNCTURE: CPT | Mod: ZL | Performed by: FAMILY MEDICINE

## 2021-07-09 ASSESSMENT — ENCOUNTER SYMPTOMS
CHEST TIGHTNESS: 0
COUGH: 0
HEADACHES: 0

## 2021-07-09 ASSESSMENT — PAIN SCALES - GENERAL: PAINLEVEL: NO PAIN (0)

## 2021-07-09 NOTE — NURSING NOTE
Patient is here to follow up. Has not had EMG yet, states she called and they did not get the referral yet.     No LMP recorded (lmp unknown). Patient has had a hysterectomy.  Medication Reconciliation: complete    Kait Bourne LPN  7/9/2021 10:13 AM    FOOD SECURITY SCREENING QUESTIONS  Hunger Vital Signs:  Within the past 12 months we worried whether our food would run out before we got money to buy more. Never  Within the past 12 months the food we bought just didn't last and we didn't have money to get more. Never  Kait Bourne LPN 7/9/2021 10:13 AM

## 2021-07-09 NOTE — PROGRESS NOTES
SUBJECTIVE:   Rose Mendez is a 51 year old female who presents to clinic today for the following health issues:    HPI  51-year-old female presents today to discuss ongoing lower extremity discomfort and paresthesias.    There is a lot of confusion about her recent referrals.  She was supposed to have a bilateral lower extremity EMG but this is yet to be scheduled.    She is here today because she was mowing the lawn last week and her feet turned dusky blue in color.  Is well demarcated from the ankles down.  Improved with rest.  This is a first time that this happened.    She continues to smoke a pack of cigarettes per day    Recent arterial Doppler study showed noncompressible arteries.  She has decreased dorsalis pedis pulses.  Patient Active Problem List    Diagnosis Date Noted     ADHD (attention deficit hyperactivity disorder), combined type 11/25/2018     Priority: Medium     Diagnostic assessment completed by Lena Ram LP at [facility] psychological services 10/2018--see scans       Adjustment disorder with mixed anxiety and depressed mood 02/16/2018     Priority: Medium     Diagnostic assessment final impression--[facility] psychological services Lena Licona LP--10/2018       Tobacco abuse 02/16/2018     Priority: Medium     Tobacco dependence 02/10/2016     Priority: Medium     Status post finger joint fusion 12/15/2015     Priority: Medium     MERCEDES on CPAP 07/10/2015     Priority: Medium     Overview:   Sleep studies and followup per Dr Alcantara 7/2015       History of hysterectomy 02/25/2015     Priority: Medium     Vitamin D deficiency 02/13/2015     Priority: Medium     Snoring 02/12/2015     Priority: Medium     Multiple thyroid nodules 01/19/2015     Priority: Medium     Ganglion cyst of wrist 09/03/2014     Priority: Medium     CMC arthritis, thumb, degenerative 08/27/2014     Priority: Medium     Past Medical History:   Diagnosis Date     ADHD (attention deficit  hyperactivity disorder), combined type 11/25/2018    Diagnostic assessment completed by Lena Ram LP at [facility] psychological services 10/2018--see scans     Adjustment disorder with mixed anxiety and depressed mood 2/16/2018    Overview:  with mood disorder     Adverse effect of anesthetic     08/01/2005,Notes prior complications from anesthesia:  (08/01/2005) Nausea     Dependence on other enabling machines and devices     7/10/2015,Sleep studies and followup per Dr Alcantara 7/2015     Disturbance of skin sensation     left side, resolved. Normal MRI     Ganglion of wrist     9/3/2014     Injury of ulnar nerve of forearm     (left Dr Keely     Nontoxic multinodular goiter     1/19/2015     Osteoarthritis of first carpometacarpal joint     8/27/2014     Other fracture of left lower leg, initial encounter for closed fracture     No Comments Provided     Personal history of other medical treatment (CODE)     20 c- sections     Vitamin D deficiency     2/13/2015        Review of Systems   Respiratory: Negative for cough and chest tightness.    Cardiovascular: Negative for chest pain and peripheral edema.   Neurological: Negative for headaches.        OBJECTIVE:     /86 (BP Location: Right arm, Patient Position: Sitting, Cuff Size: Adult Regular)   Pulse 72   Temp 97.8  F (36.6  C) (Tympanic)   Resp 16   Wt 79.8 kg (176 lb)   LMP  (LMP Unknown)   SpO2 97%   Breastfeeding No   BMI 28.41 kg/m    Body mass index is 28.41 kg/m .  Physical Exam  Musculoskeletal:      Comments: Trace ankle edema.  Decreased dorsalis pedis pulses but they are palpable bilaterally.  Cap refill is normal.  No open sores.  Sensation is intact.   Neurological:      General: No focal deficit present.      Mental Status: She is alert.             ASSESSMENT/PLAN:           ICD-10-CM    1. Paresthesia  R20.2 NEUROLOGY ADULT REFERRAL     Vitamin B12     CBC and Differential     CBC and Differential     Vitamin B12    2. Bilateral claudication of lower limb (H)  I73.9 CTA Lower Extremity Bilateral with Contrast     Peripheral arterial disease with claudication symptoms.  Proceed with CTA of the lower extremities for further evaluation.  Start aspirin 3 and 25 mg daily.  Consider statin therapy.  The most important thing changes quit smoking.    There are no Patient Instructions on file for this visit.      Nena Stafford MD  [facility] AND [facility]

## 2021-07-15 DIAGNOSIS — I10 HYPERTENSION, UNSPECIFIED TYPE: ICD-10-CM

## 2021-07-15 RX ORDER — HYDROCHLOROTHIAZIDE 25 MG/1
TABLET ORAL
Qty: 30 TABLET | Refills: 0 | Status: SHIPPED | OUTPATIENT
Start: 2021-07-15 | End: 2021-08-18

## 2021-07-15 NOTE — TELEPHONE ENCOUNTER
Requested Prescriptions   Pending Prescriptions Disp Refills     hydrochlorothiazide (HYDRODIURIL) 25 MG tablet [Pharmacy Med Name: HYDROCHLOROTHIAZIDE 25MG TABLETS] 30 tablet 0     Sig: TAKE 1 TABLET(25 MG) BY MOUTH DAILY     Last Written Prescription Date:  6/18/21  Last Fill Quantity: 30,   # refills: 0  Last Office Visit: 7/9/21 Nydia    Routing refill request to provider for review/approval because:  Looks like a new med 6/18/21? Do you want to cont' this?    Brenda J. Goodell, RN on 7/15/2021 at 3:25 PM

## 2021-07-16 ENCOUNTER — HOSPITAL ENCOUNTER (OUTPATIENT)
Dept: CT IMAGING | Facility: OTHER | Age: 51
Discharge: HOME | End: 2021-07-16
Attending: FAMILY MEDICINE | Admitting: FAMILY MEDICINE
Payer: COMMERCIAL

## 2021-07-16 DIAGNOSIS — I73.9 BILATERAL CLAUDICATION OF LOWER LIMB (H): ICD-10-CM

## 2021-07-16 PROCEDURE — 73706 CT ANGIO LWR EXTR W/O&W/DYE: CPT | Mod: 50

## 2021-07-16 PROCEDURE — 250N000011 HC RX IP 250 OP 636: Performed by: FAMILY MEDICINE

## 2021-07-16 PROCEDURE — 76377 3D RENDER W/INTRP POSTPROCES: CPT

## 2021-07-16 RX ORDER — IOPAMIDOL 755 MG/ML
100 INJECTION, SOLUTION INTRAVASCULAR ONCE
Status: COMPLETED | OUTPATIENT
Start: 2021-07-16 | End: 2021-07-16

## 2021-07-16 RX ADMIN — IOPAMIDOL 100 ML: 755 INJECTION, SOLUTION INTRAVENOUS at 10:47

## 2021-08-04 ENCOUNTER — OFFICE VISIT (OUTPATIENT)
Dept: CARDIOLOGY | Facility: OTHER | Age: 51
End: 2021-08-04
Attending: FAMILY MEDICINE
Payer: COMMERCIAL

## 2021-08-04 VITALS
TEMPERATURE: 98.7 F | SYSTOLIC BLOOD PRESSURE: 136 MMHG | DIASTOLIC BLOOD PRESSURE: 90 MMHG | BODY MASS INDEX: 27.92 KG/M2 | HEART RATE: 80 BPM | OXYGEN SATURATION: 98 % | WEIGHT: 173 LBS | RESPIRATION RATE: 16 BRPM

## 2021-08-04 DIAGNOSIS — I70.213 INTERMITTENT CLAUDICATION OF BOTH LOWER EXTREMITIES DUE TO ATHEROSCLEROSIS (H): Primary | ICD-10-CM

## 2021-08-04 DIAGNOSIS — I25.10 ASCVD (ARTERIOSCLEROTIC CARDIOVASCULAR DISEASE): ICD-10-CM

## 2021-08-04 DIAGNOSIS — G47.33 OSA (OBSTRUCTIVE SLEEP APNEA): ICD-10-CM

## 2021-08-04 DIAGNOSIS — E78.5 DYSLIPIDEMIA: ICD-10-CM

## 2021-08-04 DIAGNOSIS — R06.09 DOE (DYSPNEA ON EXERTION): ICD-10-CM

## 2021-08-04 DIAGNOSIS — R60.0 BILATERAL LOWER EXTREMITY EDEMA: ICD-10-CM

## 2021-08-04 DIAGNOSIS — R68.89 ABNORMAL ANKLE BRACHIAL INDEX (ABI): ICD-10-CM

## 2021-08-04 DIAGNOSIS — F17.200 TOBACCO DEPENDENCE: ICD-10-CM

## 2021-08-04 LAB
ATRIAL RATE - MUSE: 64 BPM
DIASTOLIC BLOOD PRESSURE - MUSE: NORMAL MMHG
INTERPRETATION ECG - MUSE: NORMAL
P AXIS - MUSE: 32 DEGREES
PR INTERVAL - MUSE: 162 MS
QRS DURATION - MUSE: 86 MS
QT - MUSE: 378 MS
QTC - MUSE: 389 MS
R AXIS - MUSE: 76 DEGREES
SYSTOLIC BLOOD PRESSURE - MUSE: NORMAL MMHG
T AXIS - MUSE: 56 DEGREES
VENTRICULAR RATE- MUSE: 64 BPM

## 2021-08-04 PROCEDURE — 93000 ELECTROCARDIOGRAM COMPLETE: CPT | Performed by: INTERNAL MEDICINE

## 2021-08-04 PROCEDURE — 99205 OFFICE O/P NEW HI 60 MIN: CPT | Performed by: NURSE PRACTITIONER

## 2021-08-04 RX ORDER — CILOSTAZOL 100 MG/1
100 TABLET ORAL 2 TIMES DAILY
Qty: 180 TABLET | Refills: 1 | Status: SHIPPED | OUTPATIENT
Start: 2021-08-04 | End: 2021-11-03

## 2021-08-04 RX ORDER — ROSUVASTATIN CALCIUM 20 MG/1
20 TABLET, COATED ORAL AT BEDTIME
Qty: 90 TABLET | Refills: 3 | Status: SHIPPED | OUTPATIENT
Start: 2021-08-04 | End: 2022-05-02

## 2021-08-04 ASSESSMENT — ANXIETY QUESTIONNAIRES
6. BECOMING EASILY ANNOYED OR IRRITABLE: NOT AT ALL
7. FEELING AFRAID AS IF SOMETHING AWFUL MIGHT HAPPEN: NOT AT ALL
IF YOU CHECKED OFF ANY PROBLEMS ON THIS QUESTIONNAIRE, HOW DIFFICULT HAVE THESE PROBLEMS MADE IT FOR YOU TO DO YOUR WORK, TAKE CARE OF THINGS AT HOME, OR GET ALONG WITH OTHER PEOPLE: NOT DIFFICULT AT ALL
GAD7 TOTAL SCORE: 0
5. BEING SO RESTLESS THAT IT IS HARD TO SIT STILL: NOT AT ALL
2. NOT BEING ABLE TO STOP OR CONTROL WORRYING: NOT AT ALL
3. WORRYING TOO MUCH ABOUT DIFFERENT THINGS: NOT AT ALL
1. FEELING NERVOUS, ANXIOUS, OR ON EDGE: NOT AT ALL

## 2021-08-04 ASSESSMENT — PAIN SCALES - GENERAL: PAINLEVEL: NO PAIN (0)

## 2021-08-04 ASSESSMENT — PATIENT HEALTH QUESTIONNAIRE - PHQ9: 5. POOR APPETITE OR OVEREATING: NOT AT ALL

## 2021-08-04 NOTE — NURSING NOTE
"Chief Complaint   Patient presents with     Consult     abnormal GABBIE       Initial BP (!) 130/90 (BP Location: Right arm, Patient Position: Sitting, Cuff Size: Adult Regular)   Pulse 80   Temp 98.7  F (37.1  C) (Tympanic)   Resp 16   Wt 78.5 kg (173 lb)   LMP  (LMP Unknown)   SpO2 98%   BMI 27.92 kg/m   Estimated body mass index is 27.92 kg/m  as calculated from the following:    Height as of 3/8/21: 1.676 m (5' 6\").    Weight as of this encounter: 78.5 kg (173 lb).  Meds Reconciled: complete  Pt is not on Aspirin  Pt is not on a Statin  PHQ and/or YOLANDA reviewed. Pt referred to PCP/MH Provider as appropriate.    FOOD SECURITY SCREENING QUESTIONS  Hunger Vital Signs:  Within the past 12 months we worried whether our food would run out before we got money to buy more. Never  Within the past 12 months the food we bought just didn't last and we didn't have money to get more. Never  Rianna Peguero LPN 8/4/2021 11:12 AM    "

## 2021-08-04 NOTE — PROGRESS NOTES
"[facility] HEART CARE   CARDIOLOGY CONSULT     Rose Mendez   [address]  [facility] [address]    Nena Christian     Chief Complaint   Patient presents with     Consult     abnormal GABBIE        HPI:   Ms. Mendez is a 51 year old female who presents for cardiology evaluation with ASCVD and symptoms of LE claudication. Patient has a PMH significant for ADHD on stimulant, tobacco dependence, MERCEDES noncompliance with CPAP, thyroid nodules and vitamin D deficiency.    Patient describes symptoms of claudication started last summer when walking a 2 mile loop around her house as regular routine. Admits that her legs would begin to ache with walking and would find relief with stopping and resting, this has gradually worsened. She also reports her feet as \"purple\" after mowing her lawn this summer. She denies any rest pain and no tissue loss. She does not have back pain or history of neurogenic claudication. She denies any chest pain or pressure. No pain in the jaw, neck, arm or back. No palpitations, lightheadedness or syncope. No edema. She does report increased LIN over the past 6 months, mainly with walking and climbing stairs.     Patient does continue to use tobacco, admits that she has cut back by 5 cigarettes per day. She is currently smoking 15 cigarettes per day. She would like to gradually decrease and use Nicoderm patches in effort for cessation. She was off tobacco 6-8 months at one point with use of Chantix, no side effects to Chantix. Admits that she is not interested in using Chantix again.     IMAGING RESULTS REVIEWED:  US GABBIE DOPPLER NO EXERCISE, 1-2 LEVELS, BILAT     Comparison study: None     Clinical history: Edema, unspecified type; Pain in both lower  extremities; Pain in both lower extremities; Smoker     Technique: Bilateral lower extremity resting ankle brachial indices  obtained.     Findings:     Right:       Arm: 132 mmHg   PT at ankle: 168 mmHg   DP at foot: 172 mmHg      " GABBIE: 1.30     Left:      Arm: 132 mmHg   PT at ankle: 187 mmHg   DP at foot: 180 mmHg      GABBIE: 1.42                                                                      Impression:      Elevated ABIs suggest noncompressible vessels.     Guidelines:     GABBIE Diagnostic Criteria (Based on criteria published in Circulation  2011; 124: 8009-6640):    > 1.4: Non compressible    1.00 - 1.40: Normal    0.91 - 0.99: Borderline    At or below 0.90: Abnormal     GABBIE Diagnostic Criteria (Based on ACC/AHA guideline 2008):    >/=1.3 - non compressible vessels    1.00  -1.29 - Normal    0.91 - 0.99 - Borderline    0.41 - 0.90 - Mild to moderate PAD    0.00 - 0.40 - Severe PAD     CLAY LOGAN MD        PAST MEDICAL HISTORY:   Past Medical History:   Diagnosis Date     ADHD (attention deficit hyperactivity disorder), combined type 11/25/2018    Diagnostic assessment completed by Lena Ram [facility] at [facility] services 10/2018--see scans     Adjustment disorder with mixed anxiety and depressed mood 2/16/2018    Overview:  with mood disorder     Adverse effect of anesthetic     08/01/2005,Notes prior complications from anesthesia:  (08/01/2005) Nausea     Dependence on other enabling machines and devices     7/10/2015,Sleep studies and followup per Dr Alcantara 7/2015     Disturbance of skin sensation     left side, resolved. Normal MRI     Ganglion of wrist     9/3/2014     Injury of ulnar nerve of forearm     (left Dr Riggs     Nontoxic multinodular goiter     1/19/2015     Osteoarthritis of first carpometacarpal joint     8/27/2014     Other fracture of left lower leg, initial encounter for closed fracture     No Comments Provided     Personal history of other medical treatment (CODE)     20 c- sections     Vitamin D deficiency     2/13/2015          FAMILY HISTORY:   Family History   Problem Relation Age of Onset     Family History Negative Daughter         Good Health     Family History  Negative Daughter         Good Health     Family History Negative Daughter         Good Health     Prostate Cancer Father         Cancer-prostate     Seizure Disorder Father         head injury     Hypertension Mother         Hypertension     Diabetes Maternal Grandmother         Diabetes     Heart Disease Paternal Grandfather 62        Heart Disease,MI      Coronary Artery Disease Paternal Grandfather 62        Coronary artery disease     Hypertension Maternal Aunt         Hypertension     Diabetes Maternal Aunt         Diabetes     Breast Cancer Maternal Aunt 70        Cancer-breast     Coronary Artery Disease Maternal Aunt 60        Coronary artery disease     Seizure Disorder Sister         congenital hypoxia     Thyroid Disease Sister         Thyroid Disease     Hypertension Maternal Uncle         Hypertension     Diabetes Maternal Uncle         Diabetes     Coronary Artery Disease Maternal Uncle 40        Coronary artery disease     Diabetes Paternal Aunt         Diabetes     Diabetes Paternal Uncle         Diabetes     Allergy (Severe) No family hx of         Allergies     Anesthesia Reaction No family hx of         Anesthesia Problem     Blood Disease No family hx of         Blood Disease     Colon Cancer No family hx of         Cancer-colon     Ovarian Cancer No family hx of         Cancer-ovarian     Other - See Comments No family hx of         Stroke          PAST SURGICAL HISTORY:   Past Surgical History:   Procedure Laterality Date     AS REVISE ULNAR NERVE AT WRIST Left 2005,12217.0,VA REVISE ULNAR NERVE AT WRIST,left, Riggs      SECTION            SECTION      ,with postpartum tubal sterilization Status post left elbow ulnar nerve transposition .     COLONOSCOPY N/A 3/1/2018    Procedure: COMBINED COLONOSCOPY, SINGLE OR MULTIPLE BIOPSY/POLYPECTOMY BY BIOPSY;  Colonoscopy;  Surgeon: Ramona Vegas MD;  Location: [facility] OR     HC ABLATION, ENDOMETRIAL, THERMAL, W/O  HYSTEROSCOPIC GUIDANCE      5/4/10,879579,ABLATION,endometrial     HYSTERECTOMY VAGINAL      7/6/10,Laparoscopic Assisted Vaginal Hysterectomy          SOCIAL HISTORY:   Social History     Socioeconomic History     Marital status: Single     Spouse name: None     Number of children: None     Years of education: None     Highest education level: None   Occupational History     None   Tobacco Use     Smoking status: Current Every Day Smoker     Packs/day: 1.00     Years: 30.00     Pack years: 30.00     Types: Cigarettes     Smokeless tobacco: Never Used   Vaping Use     Vaping Use: Never used   Substance and Sexual Activity     Alcohol use: Yes     Alcohol/week: 0.0 standard drinks     Comment: occ     Drug use: No     Comment: Drug use: No     Sexual activity: Yes     Partners: Male     Birth control/protection: Surgical, Female Surgical   Other Topics Concern     Parent/sibling w/ CABG, MI or angioplasty before 65F 55M? Not Asked   Social History Narrative     2000.  Raising her 3 children.  Ex-, Osman, sees daughters once a month.    Martina    Child, date of birth 05/12/95  Katrina    Child, date of birth 04/04/97  Shannon    Child, date of birth 08/31/98  Patient currently smokes.        [employer], works with homeless     Social Determinants of Health     Financial Resource Strain:      Difficulty of Paying Living Expenses:    Food Insecurity:      Worried About Running Out of Food in the Last Year:      Ran Out of Food in the Last Year:    Transportation Needs:      Lack of Transportation (Medical):      Lack of Transportation (Non-Medical):    Physical Activity:      Days of Exercise per Week:      Minutes of Exercise per Session:    Stress:      Feeling of Stress :    Social Connections:      Frequency of Communication with Friends and Family:      Frequency of Social Gatherings with Friends and Family:      Attends Spiritism Services:      Active Member of Clubs or Organizations:       Attends Club or Organization Meetings:      Marital Status:    Intimate Partner Violence:      Fear of Current or Ex-Partner:      Emotionally Abused:      Physically Abused:      Sexually Abused:           CURRENT MEDICATIONS:   Prior to Admission medications    Medication Sig Start Date End Date Taking? Authorizing Provider   amphetamine-dextroamphetamine (ADDERALL XR) 20 MG 24 hr capsule Take 1 capsule by mouth every morning 8/12/19  Yes Reported, Patient   amphetamine-dextroamphetamine (ADDERALL) 5 MG tablet Take 1 tablet by mouth daily Every early afternoon 8/12/19  Yes Reported, Patient   aspirin (ASA) 325 MG EC tablet Take 325 mg by mouth every 6 hours as needed for moderate pain   Yes Reported, Patient   Cholecalciferol (D 5000) 5000 UNITS TABS Take 5,000 Units by mouth daily  Patient taking differently: Take 5,000 Units by mouth every 72 hours  2/16/18  Yes Telma Garcia APRN CNP   hydrochlorothiazide (HYDRODIURIL) 25 MG tablet TAKE 1 TABLET(25 MG) BY MOUTH DAILY 7/15/21  Yes Nena Christian MD          ALLERGIES:   No Known Allergies       ROS:   CONSTITUTIONAL: No reported fever or chills. No changes in weight.  ENT: No visual disturbance, ear ache, epistaxis or sore throat.   CARDIOVASCULAR: No chest pain, chest pressure or chest discomfort. No palpitations or lower extremity edema.   RESPIRATORY: Positive for increased dyspnea upon exertion. No cough, wheezing or hemoptysis. No orthopnea or PND.   GI: No reported abdominal pain, melena or hematochezia.   : No hematuria or dysuria.   NEUROLOGICAL: No lightheadedness, dizziness, syncope, ataxia, paresthesias or weakness.   HEMATOLOGIC: No history of anemia. No bleeding or excessive bruising. No history of blood clots.   MUSCULOSKELETAL: No joint pain or swelling, no muscle pain.  ENDOCRINOLOGIC: No temperature intolerance. No hair or skin changes.  SKIN: No abnormal rashes or sores, no unusual itching.  EXTREMITIES: Positive for  symptoms of intermittent claudication affecting lower extremities.       PHYSICAL EXAM:   BP (!) 136/90 (BP Location: Right arm, Patient Position: Sitting, Cuff Size: Adult Regular)   Pulse 80   Temp 98.7  F (37.1  C) (Tympanic)   Resp 16   Wt 78.5 kg (173 lb)   LMP  (LMP Unknown)   SpO2 98%   BMI 27.92 kg/m    GENERAL: The patient is a well-developed, well-nourished, in no apparent distress.  HEENT: Head is normocephalic and atraumatic. Eyes are symmetrical with normal visual tracking. No icterus, no xanthelasmas. Nares appeared normal without nasal drainage. Mucous membranes are moist, no cyanosis.  NECK: Supple. No cervical bruits, JVP not visible.   CHEST/ LUNGS: Lungs clear to auscultation, no rales, rhonchi or wheezes, no use of accessory muscles, no retractions, respirations unlabored and normal respiratory rate.   CARDIO: Regular rate and rhythm normal with S1 and S2, no S3 or S4 and no murmur, click or rub. Precordium quiet with normal PMI.    ABD: Abdomen is nondistended.   EXTREMITIES: No clubbing, cyanosis or edema present. Pedal pulses faint to palpation and equal bilaterally.  MUSCULOSKELETAL: No visible joint swelling.   NEUROLOGIC: Alert and oriented X3. Normal speech, gait and affect. No focal neurologic deficits.   SKIN: No jaundice. No rashes or visible skin lesions present. No ecchymosis.       EKG:    NSR, rate 64 bpm, no ST-T changes    LAB RESULTS:   Office Visit on 07/09/2021   Component Date Value Ref Range Status     WBC 07/09/2021 6.8  4.0 - 11.0 10e9/L Final     RBC Count 07/09/2021 5.40* 3.8 - 5.2 10e12/L Final     Hemoglobin 07/09/2021 14.5  11.7 - 15.7 g/dL Final     Hematocrit 07/09/2021 45.3  35.0 - 47.0 % Final     MCV 07/09/2021 84  78 - 100 fl Final     MCH 07/09/2021 26.9  26.5 - 33.0 pg Final     MCHC 07/09/2021 32.0  31.5 - 36.5 g/dL Final     RDW 07/09/2021 13.7  10.0 - 15.0 % Final     Platelet Count 07/09/2021 228  150 - 450 10e9/L Final     Diff Method 07/09/2021  "Automated Method   Final     % Neutrophils 07/09/2021 49.8  % Final     % Lymphocytes 07/09/2021 38.8  % Final     % Monocytes 07/09/2021 8.2  % Final     % Eosinophils 07/09/2021 2.2  % Final     % Basophils 07/09/2021 0.7  % Final     % Immature Granulocytes 07/09/2021 0.3  % Final     Absolute Neutrophil 07/09/2021 3.4  1.6 - 8.3 10e9/L Final     Absolute Lymphocytes 07/09/2021 2.6  0.8 - 5.3 10e9/L Final     Absolute Monocytes 07/09/2021 0.6  0.0 - 1.3 10e9/L Final     Absolute Eosinophils 07/09/2021 0.2  0.0 - 0.7 10e9/L Final     Absolute Basophils 07/09/2021 0.1  0.0 - 0.2 10e9/L Final     Abs Immature Granulocytes 07/09/2021 0.0  0 - 0.4 10e9/L Final     Vitamin B12 07/09/2021 252  180 - 914 pg/mL Final   Office Visit on 05/27/2021   Component Date Value Ref Range Status     Sodium 05/27/2021 137  134 - 144 mmol/L Final     Potassium 05/27/2021 4.1  3.5 - 5.1 mmol/L Final     Chloride 05/27/2021 102  98 - 107 mmol/L Final     Carbon Dioxide 05/27/2021 27  21 - 31 mmol/L Final     Anion Gap 05/27/2021 8  3 - 14 mmol/L Final     Glucose 05/27/2021 93  70 - 105 mg/dL Final     Urea Nitrogen 05/27/2021 8  7 - 25 mg/dL Final     Creatinine 05/27/2021 0.82  0.60 - 1.20 mg/dL Final     GFR Estimate 05/27/2021 74  >60 mL/min/[1.73_m2] Final     GFR Estimate If Black 05/27/2021 89  >60 mL/min/[1.73_m2] Final     Calcium 05/27/2021 9.6  8.6 - 10.3 mg/dL Final          ASSESSMENT:   Rose Mendez presents for  cardiology evaluation with ASCVD and symptoms of LE claudication. Patient has a PMH significant for ADHD on stimulant, tobacco dependence, MERCEDES noncompliance with CPAP, thyroid nodules and vitamin D deficiency.  Patient describes symptoms of claudication started last summer when walking a 2 mile loop around her house as regular routine. Admits that her legs would begin to ache with walking and would find relief with stopping and resting, this has gradually worsened. She also reports her feet as \"purple\" after " mowing her lawn this summer. She denies any rest pain and no tissue loss. She does not have back pain or history of neurogenic claudication. She denies any chest pain or pressure. No pain in the jaw, neck, arm or back. No palpitations, lightheadedness or syncope. No edema. She does report increased LIN over the past 6 months, mainly with walking and climbing stairs.     1. Intermittent claudication of both lower extremities due to atherosclerosis (H)  2. Abnormal ankle brachial index (GABBIE)  3. Tobacco dependence  4. LIN (dyspnea on exertion)  5. MERCEDES (obstructive sleep apnea)  6. Bilateral lower extremity edema  7. ASCVD (arteriosclerotic cardiovascular disease)  8. Dyslipidemia    PLAN:   1. Patient with symptoms suggestive of intermittent claudication, gradually progressing. No rest pain or tissue loss. GABBIE's reviewed, normal on right and non compressible on left.   2. CTA with contrast revealing mild atherosclerotic plaquing of both the right and left common femoral arteries without stenosis.   3. No evidence of significant PAD, positive for atherosclerosis. She will remain on ASA 81 mg daily.   4. Trial pletal 100 mg BID for symptoms of claudication.   5. Begin Crestor 20 mg at bedtime.   6. Exercise stress echo with multiple CAD risk factors and progressive LIN.   7. Strongly encouraged tobacco cessation.  8. Follow-up with cardiology in 3 months, certainly sooner if needed.     Thank you for allowing me to participate in the care of your patient. Please do not hesitate to contact me if you have any questions.     Total time 85 min on date of encounter spent reviewing records, face to face time obtaining HPI, physical exam, reviewing results of recent testing and counseling on the above diagnoses and recommended plan of care.     Rubi Aguilar, APRN CNP [facility]

## 2021-08-04 NOTE — PATIENT INSTRUCTIONS
You were seen by  DAVID Grace CNP     1. Decrease your Aspirin to 81mg daily     2. Start cilostazol (PLETAL) 100 MG tablet - Take 1 tablet (100 mg) by mouth 2 times daily     3. Start rosuvastatin (CRESTOR) 20 MG tablet - Take 1 tablet (20 mg) by mouth At Bedtime    4. A stress echocardiogram has been ordered. You will be called to schedule this. You will receive instructions for testing at that time. You will be contacted with results.      You will follow up with [facility] in 3 months (make sure you are fasting), sooner if needed.       Please call the cardiology office with problems, questions, or concerns at [phone].    If you experience chest pain, chest pressure, chest tightness, shortness of breath, fainting, lightheadedness, nausea, vomiting, or other concerning symptoms, please report to the Emergency Department or call 911. These symptoms may be emergent, and best treated in the Emergency Department.     Cardiology Nurses  ROSALINDA Anaya LPN Amy M., LPN  [facility] (Unit 3C)  [phone]

## 2021-08-05 ASSESSMENT — ANXIETY QUESTIONNAIRES: GAD7 TOTAL SCORE: 0

## 2021-08-15 DIAGNOSIS — I10 HYPERTENSION, UNSPECIFIED TYPE: ICD-10-CM

## 2021-08-17 ENCOUNTER — OFFICE VISIT (OUTPATIENT)
Dept: NEUROLOGY | Facility: OTHER | Age: 51
End: 2021-08-17
Attending: ORTHOPAEDIC SURGERY
Payer: COMMERCIAL

## 2021-08-17 VITALS
TEMPERATURE: 98.8 F | HEIGHT: 65 IN | WEIGHT: 174 LBS | RESPIRATION RATE: 16 BRPM | HEART RATE: 86 BPM | DIASTOLIC BLOOD PRESSURE: 80 MMHG | BODY MASS INDEX: 28.99 KG/M2 | SYSTOLIC BLOOD PRESSURE: 128 MMHG | OXYGEN SATURATION: 97 %

## 2021-08-17 DIAGNOSIS — R29.898 WEAKNESS OF RIGHT LOWER EXTREMITY: ICD-10-CM

## 2021-08-17 DIAGNOSIS — M79.651 PAIN OF RIGHT THIGH: ICD-10-CM

## 2021-08-17 DIAGNOSIS — M79.661 PAIN OF RIGHT LOWER LEG: Primary | ICD-10-CM

## 2021-08-17 PROCEDURE — 99204 OFFICE O/P NEW MOD 45 MIN: CPT | Mod: 25 | Performed by: PSYCHIATRY & NEUROLOGY

## 2021-08-17 PROCEDURE — 95911 NRV CNDJ TEST 9-10 STUDIES: CPT | Performed by: PSYCHIATRY & NEUROLOGY

## 2021-08-17 PROCEDURE — 95886 MUSC TEST DONE W/N TEST COMP: CPT | Performed by: PSYCHIATRY & NEUROLOGY

## 2021-08-17 ASSESSMENT — MIFFLIN-ST. JEOR: SCORE: 1400.14

## 2021-08-17 ASSESSMENT — PAIN SCALES - GENERAL: PAINLEVEL: NO PAIN (1)

## 2021-08-17 NOTE — LETTER
8/17/2021         RE: Rose Mendez  [address]  [facility] [address]        Dear Colleague,    Thank you for referring your patient, Rose Mendez, to the [facility]. Please see a copy of my visit note below.    Visit Date: 08/17/2021    REFERRING PHYSICIAN:  Dr. Nena Bardales    HISTORY OF PRESENT ILLNESS:  The patient relates that she started to experience pain in the right lower extremity in March of this year.  There was no obvious precipitating event.  She has had pain in the thigh and the leg and sometimes the foot.  She believes there has been some loss of strength in the right lower extremity.  She has had occasional low back pain.  She is asymptomatic on the left.    PAST MEDICAL HISTORY:  Significant for attention deficit disorder and depression.  The patient has not had injury or surgery related to the lumbar spine or the lower extremity.    REVIEW OF SYSTEMS:  A 10-system review of systems other than the above is unremarkable.    PHYSICAL EXAMINATION:  The patient is a healthy-appearing, middle-aged woman.  Her gait is normal with no evident weakness.  She shows normal strength throughout for the left lower extremity and though she showed normal strength for the right lower extremity during EMG, she showed significant giveaway quality weakness for the hamstrings, quadriceps, and hip flexors during strength testing.  There was grossly normal strength for the dorsi and plantar flexors of the foot consistently.  Reflexes were hypoactive at the knees and ankles.  There was no muscle wasting.  Pinprick was well preserved throughout the lower extremities.    NERVE CONDUCTION STUDIES:  Motor nerve conduction testing was performed for the right femoral, peroneal and tibial nerves.  Distal latencies, amplitudes, conduction velocities and H reflex latencies were normal throughout.    Antidromic sensory nerve conduction studies were performed for the right  saphenous, intermediate dorsal cutaneous, medial dorsal cutaneous, and sural nerves.  Latencies, amplitudes and conduction velocities were normal.    MONOPOLAR EMG NEEDLE EXAMINATION:  Monopolar needle exam was performed for the right iliopsoas, vastus lateralis, biceps femoris, tibialis anterior and gastrocnemius.  All of the tested muscles showed normal insertional activity.  Motor units were normal in size with normal recruitment and interference.    IMPRESSION:  The patient's neurodiagnostic study of the right lower extremity is normal.  There is no evidence for focal or generalized neuropathy, nor for radiculopathy at any level from L2 through S1.    The physical exam shows inconsistent and incomplete effort during strength testing.  There may be a functional component to the patient's presentation.    Henry Burgos MD        D: 2021   T: 2021   MT: NELSON    Name:     KEVIN PORRAS  MRN:      [id number]        Account:    [id number]   :      1970           Visit Date: 2021     Document: [id number]      Again, thank you for allowing me to participate in the care of your patient.        Sincerely,        Henry Burgos MD

## 2021-08-17 NOTE — NURSING NOTE
Patient presenting for a right lower extremity EMG for back pain with radiculopathy.  Medication Reconciliation: complete  Advanced Care Directive Reviewed.    Munira Madera LPN  8/17/2021 3:06 PM

## 2021-08-18 NOTE — PROGRESS NOTES
Visit Date: 08/17/2021    REFERRING PHYSICIAN:  Dr. Nena Bardales    HISTORY OF PRESENT ILLNESS:  The patient relates that she started to experience pain in the right lower extremity in March of this year.  There was no obvious precipitating event.  She has had pain in the thigh and the leg and sometimes the foot.  She believes there has been some loss of strength in the right lower extremity.  She has had occasional low back pain.  She is asymptomatic on the left.    PAST MEDICAL HISTORY:  Significant for attention deficit disorder and depression.  The patient has not had injury or surgery related to the lumbar spine or the lower extremity.    REVIEW OF SYSTEMS:  A 10-system review of systems other than the above is unremarkable.    PHYSICAL EXAMINATION:  The patient is a healthy-appearing, middle-aged woman.  Her gait is normal with no evident weakness.  She shows normal strength throughout for the left lower extremity and though she showed normal strength for the right lower extremity during EMG, she showed significant giveaway quality weakness for the hamstrings, quadriceps, and hip flexors during strength testing.  There was grossly normal strength for the dorsi and plantar flexors of the foot consistently.  Reflexes were hypoactive at the knees and ankles.  There was no muscle wasting.  Pinprick was well preserved throughout the lower extremities.    NERVE CONDUCTION STUDIES:  Motor nerve conduction testing was performed for the right femoral, peroneal and tibial nerves.  Distal latencies, amplitudes, conduction velocities and H reflex latencies were normal throughout.    Antidromic sensory nerve conduction studies were performed for the right saphenous, intermediate dorsal cutaneous, medial dorsal cutaneous, and sural nerves.  Latencies, amplitudes and conduction velocities were normal.    MONOPOLAR EMG NEEDLE EXAMINATION:  Monopolar needle exam was performed for the right iliopsoas, vastus  lateralis, biceps femoris, tibialis anterior and gastrocnemius.  All of the tested muscles showed normal insertional activity.  Motor units were normal in size with normal recruitment and interference.    IMPRESSION:  The patient's neurodiagnostic study of the right lower extremity is normal.  There is no evidence for focal or generalized neuropathy, nor for radiculopathy at any level from L2 through S1.    The physical exam shows inconsistent and incomplete effort during strength testing.  There may be a functional component to the patient's presentation.    Henry Burgos MD        D: 2021   T: 2021   MT: MT    Name:     KEVIN PORRAS  MRN:      [id number]        Account:    [id number]   :      1970           Visit Date: 2021     Document: [id number]

## 2021-08-18 NOTE — TELEPHONE ENCOUNTER
HYDROCHLOROTHIAZIDE 25MG TABLETS  Last Written Prescription Date:  7/15/21  Last Fill Quantity: 30,   # refills: 0  Last Office Visit: 7/9/21  Future Office visit:    Next 5 appointments (look out 90 days)    Nov 03, 2021 10:15 AM  Return Visit with DAVID Bal [facility] ([facility] and [facility] ) [address]  [place] [address]  [phone]           Routing refill request to provider for review/approval because:  Drug not on the [facility], [facility] or [facility] refill protocol or controlled substance.  Marianne Muhammad RN on 8/18/2021 at 9:07 AM

## 2021-08-19 RX ORDER — HYDROCHLOROTHIAZIDE 25 MG/1
TABLET ORAL
Qty: 30 TABLET | Refills: 0 | Status: SHIPPED | OUTPATIENT
Start: 2021-08-19 | End: 2021-09-21

## 2021-09-10 ENCOUNTER — TELEPHONE (OUTPATIENT)
Dept: CARDIOLOGY | Facility: OTHER | Age: 51
End: 2021-09-10

## 2021-09-13 ENCOUNTER — HOSPITAL ENCOUNTER (OUTPATIENT)
Dept: CARDIOLOGY | Facility: OTHER | Age: 51
Discharge: HOME | End: 2021-09-13
Attending: NURSE PRACTITIONER | Admitting: NURSE PRACTITIONER
Payer: COMMERCIAL

## 2021-09-13 DIAGNOSIS — F17.200 TOBACCO DEPENDENCE: ICD-10-CM

## 2021-09-13 DIAGNOSIS — R06.09 DOE (DYSPNEA ON EXERTION): ICD-10-CM

## 2021-09-13 DIAGNOSIS — I70.213 INTERMITTENT CLAUDICATION OF BOTH LOWER EXTREMITIES DUE TO ATHEROSCLEROSIS (H): ICD-10-CM

## 2021-09-13 DIAGNOSIS — E78.5 DYSLIPIDEMIA: ICD-10-CM

## 2021-09-13 DIAGNOSIS — I25.10 ASCVD (ARTERIOSCLEROTIC CARDIOVASCULAR DISEASE): ICD-10-CM

## 2021-09-13 PROCEDURE — 93018 CV STRESS TEST I&R ONLY: CPT | Performed by: INTERNAL MEDICINE

## 2021-09-13 PROCEDURE — 93350 STRESS TTE ONLY: CPT | Mod: 26 | Performed by: INTERNAL MEDICINE

## 2021-09-13 PROCEDURE — 93017 CV STRESS TEST TRACING ONLY: CPT

## 2021-09-13 PROCEDURE — 93321 DOPPLER ECHO F-UP/LMTD STD: CPT | Mod: 26 | Performed by: INTERNAL MEDICINE

## 2021-09-13 PROCEDURE — C8928 TTE W OR W/O FOL W/CON,STRES: HCPCS

## 2021-09-13 PROCEDURE — 93016 CV STRESS TEST SUPVJ ONLY: CPT | Performed by: INTERNAL MEDICINE

## 2021-09-13 PROCEDURE — 255N000002 HC RX 255 OP 636: Performed by: NURSE PRACTITIONER

## 2021-09-13 RX ADMIN — PERFLUTREN 4 ML: 6.52 INJECTION, SUSPENSION INTRAVENOUS at 15:17

## 2021-09-13 NOTE — PROGRESS NOTES
1400:  The patient arrived for a stress echo.  The procedure, risks and benefits were discussed and the consent was signed.  The patient was prepped for the stress test, and an IV was placed per procedure.  The echo sonographer did the initial images with definity for image enhancement.    arrived, and the patient biked 7minutes and 8seconds.  The patient tolerated the procedure.  Stress images were completed and the IV was removed per procedure.  The patient was released in stable condition.  The patient was instructed that the ordering MD will call with results in one to two days.  If you have not received your results within 3 days please call the ordering provider.  Please see the chart for complete test results.

## 2021-09-18 ENCOUNTER — HEALTH MAINTENANCE LETTER (OUTPATIENT)
Age: 51
End: 2021-09-18

## 2021-09-19 DIAGNOSIS — I10 HYPERTENSION, UNSPECIFIED TYPE: ICD-10-CM

## 2021-09-21 RX ORDER — HYDROCHLOROTHIAZIDE 25 MG/1
TABLET ORAL
Qty: 30 TABLET | Refills: 0 | Status: SHIPPED | OUTPATIENT
Start: 2021-09-21 | End: 2021-10-27

## 2021-09-21 NOTE — TELEPHONE ENCOUNTER
Requested Prescriptions   Pending Prescriptions Disp Refills     hydrochlorothiazide (HYDRODIURIL) 25 MG tablet [Pharmacy Med Name: HYDROCHLOROTHIAZIDE 25MG TABLETS] 30 tablet 0     Sig: TAKE 1 TABLET(25 MG) BY MOUTH DAILY     Last Written Prescription Date:  8/19/21  Last Fill Quantity: 30,   # refills: 0  Last Office Visit: 7/9/21 [facility]  Future Office visit:    Next 5 appointments (look out 90 days)    Nov 03, 2021 10:15 AM  Return Visit with DAVID Bal [facility] ([facility] ) [address]  [place] [address]  [phone]        Routing refill request to provider for review/approval because:  Unable to fill prescription refills per RN Medical Refill Policy.  Brenda J. Goodell, RN on 9/21/2021 at 11:44 AM

## 2021-10-27 DIAGNOSIS — I10 HYPERTENSION, UNSPECIFIED TYPE: ICD-10-CM

## 2021-10-27 NOTE — TELEPHONE ENCOUNTER
"Requested Prescriptions   Pending Prescriptions Disp Refills     hydrochlorothiazide (HYDRODIURIL) 25 MG tablet [Pharmacy Med Name: HYDROCHLOROTHIAZIDE 25MG TABLETS] 30 tablet 0     Sig: TAKE 1 TABLET(25 MG) BY MOUTH DAILY       Diuretics (Including Combos) Protocol Passed - 10/27/2021  4:05 AM        Passed - Blood pressure under 140/90 in past 12 months     BP Readings from Last 3 Encounters:   08/17/21 128/80   08/04/21 (!) 136/90   07/09/21 126/86                 Passed - Recent (12 mo) or future (30 days) visit within the authorizing provider's specialty     Patient has had an office visit with the authorizing provider or a provider within the authorizing providers department within the previous 12 mos or has a future within next 30 days. See \"Patient Info\" tab in inbasket, or \"Choose Columns\" in Meds & Orders section of the refill encounter.              Passed - Medication is active on med list        Passed - Patient is age 18 or older        Passed - No active pregancy on record        Passed - Normal serum creatinine on file in past 12 months     Recent Labs   Lab Test 05/27/21  1537   CR 0.82              Passed - Normal serum potassium on file in past 12 months     Recent Labs   Lab Test 05/27/21  1537   POTASSIUM 4.1                    Passed - Normal serum sodium on file in past 12 months     Recent Labs   Lab Test 05/27/21  1537                 Passed - No positive pregnancy test in past 12 months     Last Written Prescription Date:  9/21/21  Last Fill Quantity: 30,   # refills: 0  Last Office Visit: 7/9/21 Nydia  Future Office visit:    Next 5 appointments (look out 90 days)    Nov 03, 2021 10:15 AM  Return Visit with DAVID Bal [facility] ([facility] ) [address]   [place] [facility] [address] [phone]         Routing refill request to provider for review/approval because:  Unable to fill " prescription refills per RN Medical Refill Policy.  Brenda J. Goodell, RN on 10/27/2021 at 12:53 PM

## 2021-10-28 RX ORDER — HYDROCHLOROTHIAZIDE 25 MG/1
TABLET ORAL
Qty: 90 TABLET | Refills: 0 | Status: SHIPPED | OUTPATIENT
Start: 2021-10-28 | End: 2022-01-28

## 2021-11-03 ENCOUNTER — OFFICE VISIT (OUTPATIENT)
Dept: CARDIOLOGY | Facility: OTHER | Age: 51
End: 2021-11-03
Attending: NURSE PRACTITIONER
Payer: COMMERCIAL

## 2021-11-03 VITALS
SYSTOLIC BLOOD PRESSURE: 130 MMHG | RESPIRATION RATE: 18 BRPM | HEIGHT: 65 IN | HEART RATE: 86 BPM | DIASTOLIC BLOOD PRESSURE: 80 MMHG | TEMPERATURE: 98.3 F | BODY MASS INDEX: 29.22 KG/M2 | WEIGHT: 175.4 LBS | OXYGEN SATURATION: 96 %

## 2021-11-03 DIAGNOSIS — I73.9 PAD (PERIPHERAL ARTERY DISEASE) (H): Primary | ICD-10-CM

## 2021-11-03 DIAGNOSIS — I25.10 ASCVD (ARTERIOSCLEROTIC CARDIOVASCULAR DISEASE): ICD-10-CM

## 2021-11-03 DIAGNOSIS — E78.5 DYSLIPIDEMIA: ICD-10-CM

## 2021-11-03 DIAGNOSIS — Z79.899 ON STATIN THERAPY: ICD-10-CM

## 2021-11-03 DIAGNOSIS — R68.89 ABNORMAL ANKLE BRACHIAL INDEX (ABI): ICD-10-CM

## 2021-11-03 DIAGNOSIS — R06.09 DOE (DYSPNEA ON EXERTION): ICD-10-CM

## 2021-11-03 DIAGNOSIS — I70.213 INTERMITTENT CLAUDICATION OF BOTH LOWER EXTREMITIES DUE TO ATHEROSCLEROSIS (H): ICD-10-CM

## 2021-11-03 DIAGNOSIS — F17.200 TOBACCO DEPENDENCE SYNDROME: ICD-10-CM

## 2021-11-03 LAB
ALT SERPL W P-5'-P-CCNC: 13 U/L (ref 7–52)
AST SERPL W P-5'-P-CCNC: 16 U/L (ref 13–39)

## 2021-11-03 PROCEDURE — 84450 TRANSFERASE (AST) (SGOT): CPT | Mod: ZL | Performed by: NURSE PRACTITIONER

## 2021-11-03 PROCEDURE — 84460 ALANINE AMINO (ALT) (SGPT): CPT | Mod: ZL | Performed by: NURSE PRACTITIONER

## 2021-11-03 PROCEDURE — 36415 COLL VENOUS BLD VENIPUNCTURE: CPT | Mod: ZL | Performed by: NURSE PRACTITIONER

## 2021-11-03 PROCEDURE — 99214 OFFICE O/P EST MOD 30 MIN: CPT | Performed by: NURSE PRACTITIONER

## 2021-11-03 RX ORDER — CILOSTAZOL 100 MG/1
100 TABLET ORAL 2 TIMES DAILY
Qty: 180 TABLET | Refills: 3 | Status: SHIPPED | OUTPATIENT
Start: 2021-11-03 | End: 2023-01-26

## 2021-11-03 ASSESSMENT — PAIN SCALES - GENERAL: PAINLEVEL: NO PAIN (0)

## 2021-11-03 ASSESSMENT — MIFFLIN-ST. JEOR: SCORE: 1405.87

## 2021-11-03 NOTE — PATIENT INSTRUCTIONS
You were seen by  DAVID Grace CNP     1. Laboratory blood work has been ordered for today.  You will be notified by phone call or [employer] message when the results are available.     2. No changes in your medications.      You will follow up with [facility] in 1 year, sooner if needed.     Please call the cardiology office with problems, questions, or concerns at [phone].    If you experience chest pain, chest pressure, chest tightness, shortness of breath, fainting, lightheadedness, nausea, vomiting, or other concerning symptoms, please report to the Emergency Department or call 911. These symptoms may be emergent, and best treated in the Emergency Department.     Cardiology Nurses  ROSALINDA Robbins LPN Amy, LPN  [facility] (Unit 3C)  [phone]

## 2021-11-03 NOTE — PROGRESS NOTES
"Chief Complaint   Patient presents with     Follow Up     3 month       Initial /80 (BP Location: Right arm, Patient Position: Sitting, Cuff Size: Adult Regular)   Pulse 86   Temp 98.3  F (36.8  C)   Resp 18   Ht 1.65 m (5' 4.96\")   Wt 79.6 kg (175 lb 6.4 oz)   LMP  (LMP Unknown)   SpO2 96%   BMI 29.22 kg/m   Estimated body mass index is 29.22 kg/m  as calculated from the following:    Height as of this encounter: 1.65 m (5' 4.96\").    Weight as of this encounter: 79.6 kg (175 lb 6.4 oz).  Medication Reconciliation: complete    Zina Shea RN    "

## 2021-11-03 NOTE — PROGRESS NOTES
"[facility]   CARDIOLOGY PROGRESS NOTE    Rose Mendez   [address]  [facility] [address]    Nena Christian     Chief Complaint   Patient presents with     Follow Up     3 month        HPI:   Ms. Mendez is a 51 year old female who presents for cardiology follow-up to visit on 8/4/2021 with ASCVD and symptoms of LE claudication. Patient has a PMH significant for ADHD on stimulant, tobacco dependence, MERCEDES noncompliance with CPAP, thyroid nodules and vitamin D deficiency.    Patient described symptoms of claudication started last summer when walking a 2 mile loop around her house as regular routine. Admitted that her legs would begin to ache with walking and would find relief with stopping and resting, this has gradually worsened. She also reported her feet as \"purple\" after mowing her lawn this summer. She denied any rest pain and no tissue loss. She does not have back pain or history of neurogenic claudication. She denied any chest pain or pressure. No pain in the jaw, neck, arm or back. No palpitations, lightheadedness or syncope. No edema. She did report increased LIN over the past 6 months, mainly with walking and climbing stairs.     At her last visit reviewed PAD with plaquing of both the right and left common femoral arteries without stenosis. She was started on Pletal 100 mg twice daily for symptom improvement.  Strongly encouraged tobacco cessation.    Patient does continue to use tobacco, admits that she has cut back by 5 cigarettes per day. She is currently smoking 15 cigarettes per day. She would like to gradually decrease and use Nicoderm patches in effort for cessation. She was off tobacco 6-8 months at one point with use of Chantix, no side effects to Chantix. Admits that she is not interested in using Chantix again.  Today patient reports she has not made much progress in tobacco cessation, currently about 15 cigarettes, not the most motivated right now for " cessation.     Given her symptoms of worsening exertional dyspnea and significant CAD risk factors, she underwent a stress echocardiogram on 9/13/2021.  This was a normal study with no evidence of ischemia or infarct.  Target heart rate was achieved for the study.  ECG without any evidence of exercise-induced ischemia or exercise-induced dysrhythmia.  Doppler exam with no significant valvular disease.    Patient admits that she is doing well. No symptoms of claudication since start Pletal. Working on yard work around her house without symptoms. No increased dyspnea. No myalgias or side effects to starting new statin medication. She is happy with results of new medication.     IMAGING RESULTS REVIEWED:  US GABBIE DOPPLER NO EXERCISE, 1-2 LEVELS, BILAT     Comparison study: None     Clinical history: Edema, unspecified type; Pain in both lower  extremities; Pain in both lower extremities; Smoker     Technique: Bilateral lower extremity resting ankle brachial indices  obtained.     Findings:     Right:       Arm: 132 mmHg   PT at ankle: 168 mmHg   DP at foot: 172 mmHg      GABBIE: 1.30     Left:      Arm: 132 mmHg   PT at ankle: 187 mmHg   DP at foot: 180 mmHg      GABBIE: 1.42                                                                      Impression:      Elevated ABIs suggest noncompressible vessels.     Guidelines:     GABBIE Diagnostic Criteria (Based on criteria published in Circulation  2011; 124: 0948-5094):    > 1.4: Non compressible    1.00 - 1.40: Normal    0.91 - 0.99: Borderline    At or below 0.90: Abnormal     GABBIE Diagnostic Criteria (Based on ACC/AHA guideline 2008):    >/=1.3 - non compressible vessels    1.00  -1.29 - Normal    0.91 - 0.99 - Borderline    0.41 - 0.90 - Mild to moderate PAD    0.00 - 0.40 - Severe PAD     CLAY LOGAN MD        PAST MEDICAL HISTORY:   Past Medical History:   Diagnosis Date     ADHD (attention deficit hyperactivity disorder), combined type 11/25/2018    Diagnostic  assessment completed by Lena Ram LP at [facility] 10/2018--see scans     Adjustment disorder with mixed anxiety and depressed mood 2018    Overview:  with mood disorder     Adverse effect of anesthetic     2005,Notes prior complications from anesthesia:  (2005) Nausea     Dependence on other enabling machines and devices     7/10/2015,Sleep studies and followup per Dr Alcantara 2015     Disturbance of skin sensation     left side, resolved. Normal MRI     Ganglion of wrist     9/3/2014     Injury of ulnar nerve of forearm     (left Dr Riggs     Nontoxic multinodular goiter     2015     Osteoarthritis of first carpometacarpal joint     2014     Other fracture of left lower leg, initial encounter for closed fracture     No Comments Provided     Personal history of other medical treatment (CODE)     20 c- sections     Vitamin D deficiency     2015          FAMILY HISTORY:   Family History   Problem Relation Age of Onset     Family History Negative Daughter         Good Health     Family History Negative Daughter         Good Health     Family History Negative Daughter         Good Health     Prostate Cancer Father         Cancer-prostate     Seizure Disorder Father         head injury     Hypertension Mother         Hypertension     Diabetes Maternal Grandmother         Diabetes     Heart Disease Paternal Grandfather 62        Heart Disease,MI      Coronary Artery Disease Paternal Grandfather 62        Coronary artery disease     Hypertension Maternal Aunt         Hypertension     Diabetes Maternal Aunt         Diabetes     Breast Cancer Maternal Aunt 70        Cancer-breast     Coronary Artery Disease Maternal Aunt 60        Coronary artery disease     Seizure Disorder Sister         congenital hypoxia     Thyroid Disease Sister         Thyroid Disease     Hypertension Maternal Uncle         Hypertension     Diabetes Maternal Uncle          Diabetes     Coronary Artery Disease Maternal Uncle 40        Coronary artery disease     Diabetes Paternal Aunt         Diabetes     Diabetes Paternal Uncle         Diabetes     Allergy (Severe) No family hx of         Allergies     Anesthesia Reaction No family hx of         Anesthesia Problem     Blood Disease No family hx of         Blood Disease     Colon Cancer No family hx of         Cancer-colon     Ovarian Cancer No family hx of         Cancer-ovarian     Other - See Comments No family hx of         Stroke          PAST SURGICAL HISTORY:   Past Surgical History:   Procedure Laterality Date     AS REVISE ULNAR NERVE AT WRIST Left 2005,21960.0,SC REVISE ULNAR NERVE AT WRIST,left, Riggs      SECTION            SECTION      ,with postpartum tubal sterilization Status post left elbow ulnar nerve transposition .     COLONOSCOPY N/A 3/1/2018    Procedure: COMBINED COLONOSCOPY, SINGLE OR MULTIPLE BIOPSY/POLYPECTOMY BY BIOPSY;  Colonoscopy;  Surgeon: Ramona Vegas MD;  Location: [facility] OR     HC ABLATION, ENDOMETRIAL, THERMAL, W/O HYSTEROSCOPIC GUIDANCE      5/4/10,269674,ABLATION,endometrial     HYSTERECTOMY VAGINAL      7/6/10,Laparoscopic Assisted Vaginal Hysterectomy          SOCIAL HISTORY:   Social History     Socioeconomic History     Marital status: Single     Spouse name: None     Number of children: None     Years of education: None     Highest education level: None   Occupational History     None   Tobacco Use     Smoking status: Current Every Day Smoker     Packs/day: 1.00     Years: 30.00     Pack years: 30.00     Types: Cigarettes     Smokeless tobacco: Never Used   Vaping Use     Vaping Use: Never used   Substance and Sexual Activity     Alcohol use: Yes     Alcohol/week: 0.0 standard drinks     Comment: occ     Drug use: No     Comment: Drug use: No     Sexual activity: Yes     Partners: Male     Birth control/protection: Surgical, Female Surgical   Other Topics  Concern     Parent/sibling w/ CABG, MI or angioplasty before 65F 55M? Not Asked   Social History Narrative     2000.  Raising her 3 children.  Ex-, Osamn, sees daughters once a month.    Martina    Child, date of birth 05/12/95  Katrina    Child, date of birth 04/04/97  Shannon    Child, date of birth 08/31/98  Patient currently smokes.        [facility], works with homeless     Social Determinants of Health     Financial Resource Strain:      Difficulty of Paying Living Expenses:    Food Insecurity:      Worried About Running Out of Food in the Last Year:      Ran Out of Food in the Last Year:    Transportation Needs:      Lack of Transportation (Medical):      Lack of Transportation (Non-Medical):    Physical Activity:      Days of Exercise per Week:      Minutes of Exercise per Session:    Stress:      Feeling of Stress :    Social Connections:      Frequency of Communication with Friends and Family:      Frequency of Social Gatherings with Friends and Family:      Attends Sikh Services:      Active Member of Clubs or Organizations:      Attends Club or Organization Meetings:      Marital Status:    Intimate Partner Violence:      Fear of Current or Ex-Partner:      Emotionally Abused:      Physically Abused:      Sexually Abused:           CURRENT MEDICATIONS:   Current Outpatient Medications   Medication     amphetamine-dextroamphetamine (ADDERALL XR) 20 MG 24 hr capsule     amphetamine-dextroamphetamine (ADDERALL) 5 MG tablet     aspirin (ASA) 81 MG EC tablet     Cholecalciferol (D 5000) 5000 UNITS TABS     cilostazol (PLETAL) 100 MG tablet     hydrochlorothiazide (HYDRODIURIL) 25 MG tablet     rosuvastatin (CRESTOR) 20 MG tablet     No current facility-administered medications for this visit.       ALLERGIES:   No Known Allergies       ROS:   CONSTITUTIONAL: No reported fever or chills. No changes in weight.  ENT: No visual disturbance, ear ache, epistaxis or sore throat.  "  CARDIOVASCULAR: No chest pain, chest pressure or chest discomfort. No palpitations or lower extremity edema.   RESPIRATORY: LIN, unchanged. No cough, wheezing or hemoptysis. No orthopnea or PND.   GI: No reported abdominal pain, melena or hematochezia.   : No hematuria or dysuria.   NEUROLOGICAL: No lightheadedness, dizziness, syncope, ataxia, paresthesias or weakness.   HEMATOLOGIC: No history of anemia. No bleeding or excessive bruising. No history of blood clots.   MUSCULOSKELETAL: No joint pain or swelling, no muscle pain.  ENDOCRINOLOGIC: No temperature intolerance. No hair or skin changes.  SKIN: No abnormal rashes or sores, no unusual itching.  EXTREMITIES: Symptoms of intermittent claudication affecting lower extremities has resolved since starting Pletal.      PHYSICAL EXAM:   /80 (BP Location: Right arm, Patient Position: Sitting, Cuff Size: Adult Regular)   Pulse 86   Temp 98.3  F (36.8  C)   Resp 18   Ht 1.65 m (5' 4.96\")   Wt 79.6 kg (175 lb 6.4 oz)   LMP  (LMP Unknown)   SpO2 96%   BMI 29.22 kg/m    GENERAL: The patient is a well-developed, well-nourished, in no apparent distress.  HEENT: Head is normocephalic and atraumatic. Eyes are symmetrical with normal visual tracking. No icterus, no xanthelasmas. Nares appeared normal without nasal drainage. Mucous membranes are moist, no cyanosis.  NECK: Supple. No cervical bruits, JVP not visible.   CHEST/ LUNGS: Lungs clear to auscultation, no rales, rhonchi or wheezes, no use of accessory muscles, no retractions, respirations unlabored and normal respiratory rate.   CARDIO: Regular rate and rhythm normal with S1 and S2, no S3 or S4 and no murmur, click or rub.   ABD: Abdomen is nondistended.   EXTREMITIES: No LE edema present.  MUSCULOSKELETAL: No visible joint swelling.   NEUROLOGIC: Alert and oriented X3. Normal speech, gait and affect. No focal neurologic deficits.   SKIN: No jaundice. No rashes or visible skin lesions present. No " ecchymosis.     LAB RESULTS:   Office Visit on 07/09/2021   Component Date Value Ref Range Status     WBC 07/09/2021 6.8  4.0 - 11.0 10e9/L Final     RBC Count 07/09/2021 5.40* 3.8 - 5.2 10e12/L Final     Hemoglobin 07/09/2021 14.5  11.7 - 15.7 g/dL Final     Hematocrit 07/09/2021 45.3  35.0 - 47.0 % Final     MCV 07/09/2021 84  78 - 100 fl Final     MCH 07/09/2021 26.9  26.5 - 33.0 pg Final     MCHC 07/09/2021 32.0  31.5 - 36.5 g/dL Final     RDW 07/09/2021 13.7  10.0 - 15.0 % Final     Platelet Count 07/09/2021 228  150 - 450 10e9/L Final     Diff Method 07/09/2021 Automated Method   Final     % Neutrophils 07/09/2021 49.8  % Final     % Lymphocytes 07/09/2021 38.8  % Final     % Monocytes 07/09/2021 8.2  % Final     % Eosinophils 07/09/2021 2.2  % Final     % Basophils 07/09/2021 0.7  % Final     % Immature Granulocytes 07/09/2021 0.3  % Final     Absolute Neutrophil 07/09/2021 3.4  1.6 - 8.3 10e9/L Final     Absolute Lymphocytes 07/09/2021 2.6  0.8 - 5.3 10e9/L Final     Absolute Monocytes 07/09/2021 0.6  0.0 - 1.3 10e9/L Final     Absolute Eosinophils 07/09/2021 0.2  0.0 - 0.7 10e9/L Final     Absolute Basophils 07/09/2021 0.1  0.0 - 0.2 10e9/L Final     Abs Immature Granulocytes 07/09/2021 0.0  0 - 0.4 10e9/L Final     Vitamin B12 07/09/2021 252  180 - 914 pg/mL Final   Office Visit on 05/27/2021   Component Date Value Ref Range Status     Sodium 05/27/2021 137  134 - 144 mmol/L Final     Potassium 05/27/2021 4.1  3.5 - 5.1 mmol/L Final     Chloride 05/27/2021 102  98 - 107 mmol/L Final     Carbon Dioxide 05/27/2021 27  21 - 31 mmol/L Final     Anion Gap 05/27/2021 8  3 - 14 mmol/L Final     Glucose 05/27/2021 93  70 - 105 mg/dL Final     Urea Nitrogen 05/27/2021 8  7 - 25 mg/dL Final     Creatinine 05/27/2021 0.82  0.60 - 1.20 mg/dL Final     GFR Estimate 05/27/2021 74  >60 mL/min/[1.73_m2] Final     GFR Estimate If Black 05/27/2021 89  >60 mL/min/[1.73_m2] Final     Calcium 05/27/2021 9.6  8.6 - 10.3 mg/dL  Final          ASSESSMENT:   Rose Mendez presents for cardiology follow-up to visit on 8/4/2021 with ASCVD and symptoms of LE claudication. Patient has a PMH significant for ADHD on stimulant, tobacco dependence, MERCEDES noncompliance with CPAP, thyroid nodules and vitamin D deficiency.  Patient admits that she is doing well. No symptoms of claudication since start Pletal. Working on yard work around her house without symptoms. No increased dyspnea. No myalgias or side effects to starting new statin medication. She is happy with results of new medication.     1. PAD (peripheral artery disease) (H)  2. Intermittent claudication of both lower extremities due to atherosclerosis (H)  3. Abnormal ankle brachial index (GABBIE)  4. LIN (dyspnea on exertion)  5. ASCVD (arteriosclerotic cardiovascular disease)  6. Dyslipidemia  7. On statin therapy  8. Tobacco dependence syndrome    PLAN:   1. Patient with history of intermittent claudication, gradually progressing. No rest pain or tissue loss. GABBIE's reviewed, normal on right and non compressible on left. CTA with contrast revealing mild atherosclerotic plaquing of both the right and left common femoral arteries without stenosis.   2. She was started on pletal 100 mg BID for symptoms of claudication. Reports complete resolution of symptoms since starting medication. She will also remain on ASA 81 mg daily.   3. Continue on Crestor as prescribed, tolerating well. Assess LFT's today with starting statin.   4. Exercise stress echo reviewed, this was a normal study without any ischemia or infarct, all stable findings.   5. Strongly encouraged tobacco cessation.  6. Recommended regular walking with PAD.   8. Follow-up with cardiology on an annual basis, certainly sooner if needed.     Thank you for allowing me to participate in the care of your patient. Please do not hesitate to contact me if you have any questions.     Rubi Aguilar, APRN CNP [facility]

## 2021-11-13 ENCOUNTER — HEALTH MAINTENANCE LETTER (OUTPATIENT)
Age: 51
End: 2021-11-13

## 2021-12-22 ENCOUNTER — IMMUNIZATION (OUTPATIENT)
Dept: FAMILY MEDICINE | Facility: OTHER | Age: 51
End: 2021-12-22
Attending: FAMILY MEDICINE
Payer: COMMERCIAL

## 2021-12-22 PROCEDURE — 91300 PR COVID VAC PFIZER DIL RECON 30 MCG/0.3 ML IM: CPT

## 2021-12-22 PROCEDURE — 0004A PR COVID VAC PFIZER DIL RECON 30 MCG/0.3 ML IM: CPT

## 2022-01-28 DIAGNOSIS — I10 HYPERTENSION, UNSPECIFIED TYPE: ICD-10-CM

## 2022-01-28 RX ORDER — HYDROCHLOROTHIAZIDE 25 MG/1
TABLET ORAL
Qty: 90 TABLET | Refills: 0 | Status: SHIPPED | OUTPATIENT
Start: 2022-01-28 | End: 2022-04-29

## 2022-01-28 NOTE — TELEPHONE ENCOUNTER
[facility] [place] sent Rx request for the following:   Requested Prescriptions   Pending Prescriptions Disp Refills     hydrochlorothiazide (HYDRODIURIL) 25 MG tablet [Pharmacy Med Name: HYDROCHLOROTHIAZIDE 25MG TABLETS] 90 tablet 0     Sig: TAKE 1 TABLET(25 MG) BY MOUTH DAILY       Diuretics (Including Combos) Protocol Passed - 1/28/2022  3:56 AM      Last Prescription Date:   10/28/2021  Last Fill Qty/Refills:         90, R-0    Last Office Visit:              7/9/2021 Nydia   Future Office visit:           None noted   Routing refill request to provider for review/approval Filomena Loera RN ....................  1/28/2022   8:44 AM

## 2022-04-29 DIAGNOSIS — I10 HYPERTENSION, UNSPECIFIED TYPE: ICD-10-CM

## 2022-04-29 RX ORDER — HYDROCHLOROTHIAZIDE 25 MG/1
TABLET ORAL
Qty: 90 TABLET | Refills: 0 | Status: SHIPPED | OUTPATIENT
Start: 2022-04-29 | End: 2022-07-26

## 2022-04-29 NOTE — TELEPHONE ENCOUNTER
[facility] sent Rx request for the following:      Requested Prescriptions   Pending Prescriptions Disp Refills     hydrochlorothiazide (HYDRODIURIL) 25 MG tablet [Pharmacy Med Name: HYDROCHLOROTHIAZIDE 25MG TABLETS] 90 tablet 0     Sig: TAKE 1 TABLET(25 MG) BY MOUTH DAILY   Last Prescription Date:   1/28/22  Last Fill Qty/Refills:         90, R-0    Last Office Visit:              7/9/21   Future Office visit:           None    Adeola Perez RN .............. 4/29/2022  8:38 AM

## 2022-05-01 DIAGNOSIS — I70.213 INTERMITTENT CLAUDICATION OF BOTH LOWER EXTREMITIES DUE TO ATHEROSCLEROSIS (H): ICD-10-CM

## 2022-05-01 DIAGNOSIS — E78.5 DYSLIPIDEMIA: ICD-10-CM

## 2022-05-01 DIAGNOSIS — I25.10 ASCVD (ARTERIOSCLEROTIC CARDIOVASCULAR DISEASE): ICD-10-CM

## 2022-05-02 RX ORDER — ROSUVASTATIN CALCIUM 20 MG/1
TABLET, COATED ORAL
Qty: 90 TABLET | Refills: 3 | Status: SHIPPED | OUTPATIENT
Start: 2022-05-02 | End: 2023-03-21

## 2022-05-02 NOTE — TELEPHONE ENCOUNTER
"Requested Prescriptions   Pending Prescriptions Disp Refills     rosuvastatin (CRESTOR) 20 MG tablet [Pharmacy Med Name: ROSUVASTATIN 20MG TABLETS] 90 tablet 3     Sig: TAKE 1 TABLET(20 MG) BY MOUTH AT BEDTIME       Statins Protocol Failed - 5/1/2022  8:06 AM        Failed - LDL on file in past 12 months     Recent Labs   Lab Test 09/10/20  1117   *             Passed - No abnormal creatine kinase in past 12 months     No lab results found.             Passed - Recent (12 mo) or future (30 days) visit within the authorizing provider's specialty     Patient has had an office visit with the authorizing provider or a provider within the authorizing providers department within the previous 12 mos or has a future within next 30 days. See \"Patient Info\" tab in inbasket, or \"Choose Columns\" in Meds & Orders section of the refill encounter.              Passed - Medication is active on med list        Passed - Patient is age 18 or older        Passed - No active pregnancy on record        Passed - No positive pregnancy test in past 12 months           Last Written Prescription Date:  8/4/21  Last Fill Quantity: 90,   # refills: 3  Last Office Visit: 11/3/21  Future Office visit:   none    Routing refill request to provider for review/approval because:  Overdue for labs    Unable to complete prescription refill per RN Medication Refill Policy.   Itzel Elizabeth RN ....................  5/2/2022   9:56 AM    "

## 2022-06-30 ENCOUNTER — HOSPITAL ENCOUNTER (OUTPATIENT)
Dept: MAMMOGRAPHY | Facility: OTHER | Age: 52
Discharge: HOME | End: 2022-06-30
Attending: FAMILY MEDICINE | Admitting: FAMILY MEDICINE
Payer: COMMERCIAL

## 2022-06-30 DIAGNOSIS — Z12.31 VISIT FOR SCREENING MAMMOGRAM: ICD-10-CM

## 2022-06-30 PROCEDURE — 77067 SCR MAMMO BI INCL CAD: CPT

## 2022-07-26 DIAGNOSIS — I10 HYPERTENSION, UNSPECIFIED TYPE: ICD-10-CM

## 2022-07-26 RX ORDER — HYDROCHLOROTHIAZIDE 25 MG/1
TABLET ORAL
Qty: 90 TABLET | Refills: 0 | Status: SHIPPED | OUTPATIENT
Start: 2022-07-26 | End: 2022-10-26

## 2022-07-26 NOTE — TELEPHONE ENCOUNTER
[facility] sent Rx request for the following:      Requested Prescriptions   Pending Prescriptions Disp Refills     hydrochlorothiazide (HYDRODIURIL) 25 MG tablet [Pharmacy Med Name: HYDROCHLOROTHIAZIDE 25MG TABLETS] 90 tablet 0     Sig: TAKE 1 TABLET(25 MG) BY MOUTH DAILY       Diuretics (Including Combos) Protocol Failed - 7/26/2022 11:55 AM        Failed - Normal serum creatinine on file in past 12 months     Recent Labs   Lab Test 05/27/21  1537   CR 0.82           Failed - Normal serum potassium on file in past 12 months     Recent Labs   Lab Test 05/27/21  1537   POTASSIUM 4.1           Failed - Normal serum sodium on file in past 12 months     Recent Labs   Lab Test 05/27/21  1537           Last Prescription Date:   4/29/22  Last Fill Qty/Refills:         90, R-0    Last Office Visit:              7/9/21   Future Office visit:           None    Pt due for annual exam. Routing to provider for refill consideration. Routing to  OUTREACH APPT REQUESTS pool, to assist Pt in scheduling appointment.     Adeola Perez RN .............. 7/26/2022  11:56 AM

## 2022-10-24 DIAGNOSIS — I10 HYPERTENSION, UNSPECIFIED TYPE: ICD-10-CM

## 2022-10-26 RX ORDER — HYDROCHLOROTHIAZIDE 25 MG/1
TABLET ORAL
Qty: 90 TABLET | Refills: 0 | Status: SHIPPED | OUTPATIENT
Start: 2022-10-26 | End: 2023-01-26

## 2022-10-26 NOTE — TELEPHONE ENCOUNTER
[facility] sent Rx request for the following:      HYDROCHLOROTHIAZIDE 25MG TABLETS      Last Prescription Date:   7/26/2022  Last Fill Qty/Refills:         90, R-0    Last Office Visit:              7/9/2021   Future Office visit:           none    Isidro Abbott RN, BSN  ....................  10/26/2022   10:38 AM

## 2022-11-19 ENCOUNTER — HEALTH MAINTENANCE LETTER (OUTPATIENT)
Age: 52
End: 2022-11-19

## 2023-01-26 DIAGNOSIS — R68.89 ABNORMAL ANKLE BRACHIAL INDEX (ABI): ICD-10-CM

## 2023-01-26 DIAGNOSIS — I70.213 INTERMITTENT CLAUDICATION OF BOTH LOWER EXTREMITIES DUE TO ATHEROSCLEROSIS (H): ICD-10-CM

## 2023-01-26 DIAGNOSIS — I10 HYPERTENSION, UNSPECIFIED TYPE: ICD-10-CM

## 2023-01-26 DIAGNOSIS — I25.10 ASCVD (ARTERIOSCLEROTIC CARDIOVASCULAR DISEASE): ICD-10-CM

## 2023-01-26 RX ORDER — CILOSTAZOL 100 MG/1
TABLET ORAL
Qty: 180 TABLET | Refills: 3 | Status: SHIPPED | OUTPATIENT
Start: 2023-01-26 | End: 2023-12-05

## 2023-01-26 RX ORDER — HYDROCHLOROTHIAZIDE 25 MG/1
TABLET ORAL
Qty: 90 TABLET | Refills: 0 | Status: SHIPPED | OUTPATIENT
Start: 2023-01-26 | End: 2023-03-21

## 2023-01-26 NOTE — TELEPHONE ENCOUNTER
"[id number] - [place], [place] - [address]     Requested Prescriptions   Pending Prescriptions Disp Refills     cilostazol (PLETAL) 100 MG tablet [Pharmacy Med Name: CILOSTAZOL 100MG TABLETS] 180 tablet 3     Sig: TAKE 1 TABLET(100 MG) BY MOUTH TWICE DAILY       Platelet Inhibitors Failed - 1/26/2023  8:07 AM        Failed - Normal HGB on file in past 12 months     Recent Labs   Lab Test 07/09/21  1027   HGB 14.5           Failed - Normal Platelets on file in past 12 months     Recent Labs   Lab Test 07/09/21  1027              Failed - Recent (12 mo) or future (30 days) visit within the authorizing provider's specialty     Patient has had an office visit with the authorizing provider or a provider within the authorizing providers department within the previous 12 mos or has a future within next 30 days. See \"Patient Info\" tab in inbasket, or \"Choose Columns\" in Meds & Orders section of the refill encounter.            Failed - Normal serum creatinine on file in past 12 months     Recent Labs   Lab Test 05/27/21  1537   CR 0.82     Ok to refill medication if creatinine is low          Passed - Medication is active on med list        Passed - Patient is age 18 or older        Passed - No active pregnancy on record        Passed - No positive pregnancy test in past 12 months             Last Prescription Date:   11/3/21  Last Fill Qty/Refills:         180, R-3    Last Office Visit:              11/3/21  Future Office visit:          None      PER LOV: Follow up in 1 year     Marilynn Moe RN on 1/26/2023 at 10:40 AM        "

## 2023-01-26 NOTE — TELEPHONE ENCOUNTER
[facility] sent Rx request for the following:      Requested Prescriptions   Pending Prescriptions Disp Refills     hydrochlorothiazide (HYDRODIURIL) 25 MG tablet [Pharmacy Med Name: HYDROCHLOROTHIAZIDE 25MG TABLETS] 90 tablet 0     Sig: TAKE 1 TABLET(25 MG) BY MOUTH DAILY       Diuretics (Including Combos) Protocol Failed - 1/26/2023 11:57 AM        Failed - Blood pressure under 140/90 in past 12 months     BP Readings from Last 3 Encounters:   11/03/21 130/80   08/17/21 128/80   08/04/21 (!) 136/90           Failed - Recent (12 mo) or future (30 days) visit within the authorizing provider's specialty        Failed - Normal serum creatinine on file in past 12 months     Recent Labs   Lab Test 05/27/21  1537   CR 0.82           Failed - Normal serum potassium on file in past 12 months     Recent Labs   Lab Test 05/27/21  1537   POTASSIUM 4.1           Failed - Normal serum sodium on file in past 12 months     Recent Labs   Lab Test 05/27/21  1537           Last Prescription Date:   10/26/22  Last Fill Qty/Refills:         90, R-0    Last Office Visit:              7/9/21   Future Office visit:           None    Pt due for annual exam. Routing to provider for refill consideration. Routing to  OUTREACH APPT REQUESTS pool, to assist Pt in scheduling appointment.     Adeola Perez RN .............. 1/26/2023  11:58 AM

## 2023-03-21 ENCOUNTER — OFFICE VISIT (OUTPATIENT)
Dept: FAMILY MEDICINE | Facility: OTHER | Age: 53
End: 2023-03-21
Attending: FAMILY MEDICINE
Payer: COMMERCIAL

## 2023-03-21 VITALS
TEMPERATURE: 98.3 F | DIASTOLIC BLOOD PRESSURE: 76 MMHG | SYSTOLIC BLOOD PRESSURE: 138 MMHG | OXYGEN SATURATION: 98 % | HEIGHT: 66 IN | HEART RATE: 92 BPM | RESPIRATION RATE: 18 BRPM | WEIGHT: 176.8 LBS | BODY MASS INDEX: 28.42 KG/M2

## 2023-03-21 DIAGNOSIS — I10 HYPERTENSION, UNSPECIFIED TYPE: ICD-10-CM

## 2023-03-21 DIAGNOSIS — Z00.00 HEALTHCARE MAINTENANCE: Primary | ICD-10-CM

## 2023-03-21 DIAGNOSIS — I70.213 INTERMITTENT CLAUDICATION OF BOTH LOWER EXTREMITIES DUE TO ATHEROSCLEROSIS (H): ICD-10-CM

## 2023-03-21 DIAGNOSIS — E78.5 DYSLIPIDEMIA: ICD-10-CM

## 2023-03-21 DIAGNOSIS — I25.10 ASCVD (ARTERIOSCLEROTIC CARDIOVASCULAR DISEASE): ICD-10-CM

## 2023-03-21 LAB
ALBUMIN SERPL BCG-MCNC: 4.3 G/DL (ref 3.5–5.2)
ALP SERPL-CCNC: 102 U/L (ref 35–104)
ALT SERPL W P-5'-P-CCNC: 16 U/L (ref 10–35)
ANION GAP SERPL CALCULATED.3IONS-SCNC: 9 MMOL/L (ref 7–15)
AST SERPL W P-5'-P-CCNC: 23 U/L (ref 10–35)
BILIRUB SERPL-MCNC: 0.2 MG/DL
BUN SERPL-MCNC: 9.9 MG/DL (ref 6–20)
CALCIUM SERPL-MCNC: 9.2 MG/DL (ref 8.6–10)
CHLORIDE SERPL-SCNC: 101 MMOL/L (ref 98–107)
CREAT SERPL-MCNC: 0.84 MG/DL (ref 0.51–0.95)
DEPRECATED HCO3 PLAS-SCNC: 28 MMOL/L (ref 22–29)
GFR SERPL CREATININE-BSD FRML MDRD: 83 ML/MIN/1.73M2
GLUCOSE SERPL-MCNC: 132 MG/DL (ref 70–99)
POTASSIUM SERPL-SCNC: 4 MMOL/L (ref 3.4–5.3)
PROT SERPL-MCNC: 6.7 G/DL (ref 6.4–8.3)
SODIUM SERPL-SCNC: 138 MMOL/L (ref 136–145)

## 2023-03-21 PROCEDURE — 90471 IMMUNIZATION ADMIN: CPT | Performed by: FAMILY MEDICINE

## 2023-03-21 PROCEDURE — 80053 COMPREHEN METABOLIC PANEL: CPT | Mod: ZL | Performed by: FAMILY MEDICINE

## 2023-03-21 PROCEDURE — 99396 PREV VISIT EST AGE 40-64: CPT | Mod: 25 | Performed by: FAMILY MEDICINE

## 2023-03-21 PROCEDURE — 90715 TDAP VACCINE 7 YRS/> IM: CPT | Performed by: FAMILY MEDICINE

## 2023-03-21 PROCEDURE — 36415 COLL VENOUS BLD VENIPUNCTURE: CPT | Mod: ZL | Performed by: FAMILY MEDICINE

## 2023-03-21 RX ORDER — ROSUVASTATIN CALCIUM 20 MG/1
20 TABLET, COATED ORAL AT BEDTIME
Qty: 90 TABLET | Refills: 3 | Status: SHIPPED | OUTPATIENT
Start: 2023-03-21 | End: 2024-04-09

## 2023-03-21 RX ORDER — HYDROCHLOROTHIAZIDE 25 MG/1
25 TABLET ORAL DAILY
Qty: 90 TABLET | Refills: 3 | Status: SHIPPED | OUTPATIENT
Start: 2023-03-21 | End: 2023-04-30

## 2023-03-21 ASSESSMENT — ANXIETY QUESTIONNAIRES
6. BECOMING EASILY ANNOYED OR IRRITABLE: NOT AT ALL
7. FEELING AFRAID AS IF SOMETHING AWFUL MIGHT HAPPEN: NOT AT ALL
1. FEELING NERVOUS, ANXIOUS, OR ON EDGE: NOT AT ALL
4. TROUBLE RELAXING: NOT AT ALL
3. WORRYING TOO MUCH ABOUT DIFFERENT THINGS: NOT AT ALL
GAD7 TOTAL SCORE: 0
8. IF YOU CHECKED OFF ANY PROBLEMS, HOW DIFFICULT HAVE THESE MADE IT FOR YOU TO DO YOUR WORK, TAKE CARE OF THINGS AT HOME, OR GET ALONG WITH OTHER PEOPLE?: NOT DIFFICULT AT ALL
7. FEELING AFRAID AS IF SOMETHING AWFUL MIGHT HAPPEN: NOT AT ALL
GAD7 TOTAL SCORE: 0
IF YOU CHECKED OFF ANY PROBLEMS ON THIS QUESTIONNAIRE, HOW DIFFICULT HAVE THESE PROBLEMS MADE IT FOR YOU TO DO YOUR WORK, TAKE CARE OF THINGS AT HOME, OR GET ALONG WITH OTHER PEOPLE: NOT DIFFICULT AT ALL
5. BEING SO RESTLESS THAT IT IS HARD TO SIT STILL: NOT AT ALL
GAD7 TOTAL SCORE: 0
2. NOT BEING ABLE TO STOP OR CONTROL WORRYING: NOT AT ALL

## 2023-03-21 ASSESSMENT — ENCOUNTER SYMPTOMS
WEAKNESS: 0
ABDOMINAL PAIN: 0
PARESTHESIAS: 0
HEARTBURN: 0
NAUSEA: 0
MYALGIAS: 0
ARTHRALGIAS: 0
DIZZINESS: 0
FEVER: 0
HEADACHES: 0
CHILLS: 0
COUGH: 0
HEMATOCHEZIA: 0
HEMATURIA: 0
SHORTNESS OF BREATH: 0
EYE PAIN: 0
SORE THROAT: 0
BREAST MASS: 0
NERVOUS/ANXIOUS: 0
DIARRHEA: 0
JOINT SWELLING: 0
DYSURIA: 0
CONSTIPATION: 0
FREQUENCY: 0
PALPITATIONS: 0

## 2023-03-21 ASSESSMENT — PAIN SCALES - GENERAL: PAINLEVEL: NO PAIN (0)

## 2023-03-21 ASSESSMENT — PATIENT HEALTH QUESTIONNAIRE - PHQ9
10. IF YOU CHECKED OFF ANY PROBLEMS, HOW DIFFICULT HAVE THESE PROBLEMS MADE IT FOR YOU TO DO YOUR WORK, TAKE CARE OF THINGS AT HOME, OR GET ALONG WITH OTHER PEOPLE: NOT DIFFICULT AT ALL
SUM OF ALL RESPONSES TO PHQ QUESTIONS 1-9: 1
SUM OF ALL RESPONSES TO PHQ QUESTIONS 1-9: 1

## 2023-03-21 NOTE — NURSING NOTE
Patient is here for physical.     No LMP recorded (lmp unknown). Patient has had a hysterectomy.  Medication Reconciliation: complete    Kait Bourne LPN 3/21/2023 3:22 PM       Advance care directive on file? no  Advance care directive provided to patient? no       Kait Bourne LPN

## 2023-03-21 NOTE — PROGRESS NOTES
SUBJECTIVE:   CC: Rose is an 52 year old who presents for preventive health visit.  52-year-old female presents for preventative care.  She is due for refills for hypertension.  Unfortunately she ran out of her statin medication and was unable to get it refilled.  She was found to have peripheral arterial disease and started on Crestor, aspirin 81 mg and Pletal.  Denies any current claudication symptoms.    Status post hysterectomy for benign reasons     Healthy Habits:     Getting at least 3 servings of Calcium per day:  NO    Bi-annual eye exam:  Yes    Dental care twice a year:  NO    Sleep apnea or symptoms of sleep apnea:  Daytime drowsiness, Excessive snoring and Sleep apnea    Diet:  Regular (no restrictions)    Frequency of exercise:  None    Taking medications regularly:  Yes    Medication side effects:  None    PHQ-2 Total Score: 0    Additional concerns today:  No          Hypertension Follow-up      Do you check your blood pressure regularly outside of the clinic? Yes     Are you following a low salt diet? Yes    Are your blood pressures ever more than 140 on the top number (systolic) OR more   than 90 on the bottom number (diastolic), for example 140/90? No      Today's PHQ-2 Score:   PHQ-2 ( 1999 [employer]) 3/21/2023   Q1: Little interest or pleasure in doing things 0   Q2: Feeling down, depressed or hopeless 0   PHQ-2 Score 0   PHQ-2 Total Score (12-17 Years)- Positive if 3 or more points; Administer PHQ-A if positive -   Q1: Little interest or pleasure in doing things Not at all   Q2: Feeling down, depressed or hopeless Not at all   PHQ-2 Score 0       Have you ever done Advance Care Planning? (For example, a Health Directive, POLST, or a discussion with a medical provider or your loved ones about your wishes): Yes, patient states has an Advance Care Planning document and will bring a copy to the clinic.    Social History     Tobacco Use     Smoking status: Every Day     Packs/day: 1.00     Years:  30.00     Pack years: 30.00     Types: Cigarettes     Smokeless tobacco: Never   Substance Use Topics     Alcohol use: Yes     Alcohol/week: 0.0 standard drinks     Comment: occ         Alcohol Use 3/21/2023   Prescreen: >3 drinks/day or >7 drinks/week? Not Applicable       Reviewed orders with patient.  Reviewed health maintenance and updated orders accordingly - Yes  BP Readings from Last 3 Encounters:   23 138/76   21 130/80   21 128/80    Wt Readings from Last 3 Encounters:   23 80.2 kg (176 lb 12.8 oz)   21 79.6 kg (175 lb 6.4 oz)   21 78.9 kg (174 lb)                  Patient Active Problem List   Diagnosis     Adjustment disorder with mixed anxiety and depressed mood     CMC arthritis, thumb, degenerative     Ganglion cyst of wrist     History of hysterectomy     Multiple thyroid nodules     MERCEDES on CPAP     Snoring     Tobacco abuse     Tobacco dependence     Vitamin D deficiency     Status post finger joint fusion     ADHD (attention deficit hyperactivity disorder), combined type     Past Surgical History:   Procedure Laterality Date     AS REVISE ULNAR NERVE AT WRIST Left 2005,40727.0,NH REVISE ULNAR NERVE AT WRIST,left, [facility]      SECTION            SECTION      ,with postpartum tubal sterilization Status post left elbow ulnar nerve transposition .     COLONOSCOPY N/A 3/1/2018    Procedure: COMBINED COLONOSCOPY, SINGLE OR MULTIPLE BIOPSY/POLYPECTOMY BY BIOPSY;  Colonoscopy;  Surgeon: Ramona Vegas MD;  Location: [facility] OR     HC ABLATION, ENDOMETRIAL, THERMAL, W/O HYSTEROSCOPIC GUIDANCE      5/4/10,238872,ABLATION,endometrial     HYSTERECTOMY VAGINAL      7/6/10,Laparoscopic Assisted Vaginal Hysterectomy       Social History     Tobacco Use     Smoking status: Every Day     Packs/day: 1.00     Years: 30.00     Pack years: 30.00     Types: Cigarettes     Smokeless tobacco: Never   Substance Use Topics     Alcohol use: Yes      Alcohol/week: 0.0 standard drinks     Comment: occ     Family History   Problem Relation Age of Onset     Family History Negative Daughter         Good Health     Family History Negative Daughter         Good Health     Family History Negative Daughter         Good Health     Prostate Cancer Father         Cancer-prostate     Seizure Disorder Father         head injury     Hypertension Mother         Hypertension     Diabetes Maternal Grandmother         Diabetes     Heart Disease Paternal Grandfather 62        Heart Disease,MI      Coronary Artery Disease Paternal Grandfather 62        Coronary artery disease     Hypertension Maternal Aunt         Hypertension     Diabetes Maternal Aunt         Diabetes     Breast Cancer Maternal Aunt 70        Cancer-breast     Coronary Artery Disease Maternal Aunt 60        Coronary artery disease     Seizure Disorder Sister         congenital hypoxia     Thyroid Disease Sister         Thyroid Disease     Hypertension Maternal Uncle         Hypertension     Diabetes Maternal Uncle         Diabetes     Coronary Artery Disease Maternal Uncle 40        Coronary artery disease     Diabetes Paternal Aunt         Diabetes     Diabetes Paternal Uncle         Diabetes     Allergy (Severe) No family hx of         Allergies     Anesthesia Reaction No family hx of         Anesthesia Problem     Blood Disease No family hx of         Blood Disease     Colon Cancer No family hx of         Cancer-colon     Ovarian Cancer No family hx of         Cancer-ovarian     Other - See Comments No family hx of         Stroke           Breast Cancer Screening:  Any new diagnosis of family breast, ovarian, or bowel cancer? No    FHS-7:   Breast CA Risk Assessment (FHS-7) 2022   Did any of your first-degree relatives have breast or ovarian cancer? Yes   Did any of your relatives have bilateral breast cancer? No   Did any man in your family have breast cancer? No   Did any woman in your family have  breast and ovarian cancer? No   Did any woman in your family have breast cancer before age 50 y? No   Do you have 2 or more relatives with breast and/or ovarian cancer? No   Do you have 2 or more relatives with breast and/or bowel cancer? No       Mammogram Screening: Recommended mammography every 1-2 years with patient discussion and risk factor consideration  Pertinent mammograms are reviewed under the imaging tab.    History of abnormal Pap smear: Status post benign hysterectomy. Health Maintenance and Surgical History updated.     Reviewed and updated as needed this visit by clinical staff   Tobacco  Allergies  Meds      Soc Hx        Reviewed and updated as needed this visit by Provider                 Past Medical History:   Diagnosis Date     ADHD (attention deficit hyperactivity disorder), combined type 11/25/2018    Diagnostic assessment completed by [facility] at [facility] services 10/2018--see scans     Adjustment disorder with mixed anxiety and depressed mood 2/16/2018    Overview:  with mood disorder     Adverse effect of anesthetic     08/01/2005,Notes prior complications from anesthesia:  (08/01/2005) Nausea     Dependence on other enabling machines and devices     7/10/2015,Sleep studies and followup per Dr Alcantara 7/2015     Disturbance of skin sensation     left side, resolved. Normal MRI     Ganglion of wrist     9/3/2014     Injury of ulnar nerve of forearm     (left Dr Riggs     Nontoxic multinodular goiter     1/19/2015     Osteoarthritis of first carpometacarpal joint     8/27/2014     Other fracture of left lower leg, initial encounter for closed fracture     No Comments Provided     Personal history of other medical treatment (CODE)     20 c- sections     Vitamin D deficiency     2/13/2015      Past Surgical History:   Procedure Laterality Date     AS REVISE ULNAR NERVE AT WRIST Left 2005 2005,20077.0,[place] REVISE ULNAR NERVE AT WRIST,leftKeely      " SECTION            SECTION      ,with postpartum tubal sterilization Status post left elbow ulnar nerve transposition .     COLONOSCOPY N/A 3/1/2018    Procedure: COMBINED COLONOSCOPY, SINGLE OR MULTIPLE BIOPSY/POLYPECTOMY BY BIOPSY;  Colonoscopy;  Surgeon: Ramona Vegas MD;  Location: [facility] OR     HC ABLATION, ENDOMETRIAL, THERMAL, W/O HYSTEROSCOPIC GUIDANCE      5/4/10,788281,ABLATION,endometrial     HYSTERECTOMY VAGINAL      7/6/10,Laparoscopic Assisted Vaginal Hysterectomy       Review of Systems   Constitutional: Negative for chills and fever.   HENT: Negative for congestion, ear pain, hearing loss and sore throat.    Eyes: Negative for pain and visual disturbance.   Respiratory: Negative for cough and shortness of breath.    Cardiovascular: Negative for chest pain, palpitations and peripheral edema.   Gastrointestinal: Negative for abdominal pain, constipation, diarrhea, heartburn, hematochezia and nausea.   Breasts:  Negative for tenderness, breast mass and discharge.   Genitourinary: Negative for dysuria, frequency, genital sores, hematuria, pelvic pain, urgency, vaginal bleeding and vaginal discharge.   Musculoskeletal: Negative for arthralgias, joint swelling and myalgias.   Skin: Negative for rash.   Neurological: Negative for dizziness, weakness, headaches and paresthesias.   Psychiatric/Behavioral: Negative for mood changes. The patient is not nervous/anxious.           OBJECTIVE:   /76   Pulse 92   Temp 98.3  F (36.8  C) (Tympanic)   Resp 18   Ht 1.67 m (5' 5.75\")   Wt 80.2 kg (176 lb 12.8 oz)   LMP  (LMP Unknown)   SpO2 98%   Breastfeeding No   BMI 28.75 kg/m    Physical Exam  GENERAL: healthy, alert and no distress  HENT: ear canals and TM's normal, nose and mouth without ulcers or lesions  NECK: no adenopathy, no asymmetry, masses, or scars and thyroid normal to palpation  RESP: lungs clear to auscultation - no rales, rhonchi or wheezes  BREAST: normal " without masses, tenderness or nipple discharge and no palpable axillary masses or adenopathy  CV: regular rate and rhythm, normal S1 S2, no S3 or S4, no murmur, click or rub, no peripheral edema and peripheral pulses strong  ABDOMEN: soft, nontender, no hepatosplenomegaly, no masses and bowel sounds normal  MS: no gross musculoskeletal defects noted, no edema  SKIN: no suspicious lesions or rashes  NEURO: Normal strength and tone, mentation intact and speech normal  PSYCH: mentation appears normal, affect normal/bright    Diagnostic Test Results:  Labs reviewed in Epic  Results for orders placed or performed in visit on 03/21/23 (from the past 24 hour(s))   Comprehensive Metabolic Panel   Result Value Ref Range    Sodium 138 136 - 145 mmol/L    Potassium 4.0 3.4 - 5.3 mmol/L    Chloride 101 98 - 107 mmol/L    Carbon Dioxide (CO2) 28 22 - 29 mmol/L    Anion Gap 9 7 - 15 mmol/L    Urea Nitrogen 9.9 6.0 - 20.0 mg/dL    Creatinine 0.84 0.51 - 0.95 mg/dL    Calcium 9.2 8.6 - 10.0 mg/dL    Glucose 132 (H) 70 - 99 mg/dL    Alkaline Phosphatase 102 35 - 104 U/L    AST 23 10 - 35 U/L    ALT 16 10 - 35 U/L    Protein Total 6.7 6.4 - 8.3 g/dL    Albumin 4.3 3.5 - 5.2 g/dL    Bilirubin Total 0.2 <=1.2 mg/dL    GFR Estimate 83 >60 mL/min/1.73m2       ASSESSMENT/PLAN:       ICD-10-CM    1. Healthcare maintenance  Z00.00       2. Hypertension, unspecified type  I10 hydrochlorothiazide (HYDRODIURIL) 25 MG tablet     Comprehensive Metabolic Panel     Comprehensive Metabolic Panel      3. Intermittent claudication of both lower extremities due to atherosclerosis (H)  I70.213 rosuvastatin (CRESTOR) 20 MG tablet      4. ASCVD (arteriosclerotic cardiovascular disease)  I25.10 rosuvastatin (CRESTOR) 20 MG tablet      5. Dyslipidemia  E78.5 rosuvastatin (CRESTOR) 20 MG tablet          Patient is counseled on importance of regular self breast exams and mammograms every 1-2 years starting at age 40. Discussed importance of calcium and  "vitamin D supplementation and osteoporosis screening. Immunizations are updated based on CDC recommendations and patients desire. Reviewed importance of sunscreen, limit sun exposure and monitoring for changing moles with ABCDEs. Recommend seatbelt use and helmets with biking, skiing and ATV/Snowmobile use.  Patient no longer needs screening Pap smears.  Continue Crestor, Pletal and aspirin  Encourage smoking cessation        COUNSELING:  Reviewed preventive health counseling, as reflected in patient instructions  Special attention given to:        Regular exercise       Healthy diet/nutrition       Vision screening       Hearing screening       Aspirin prophylaxis       Osteoporosis prevention/bone health       Colorectal Cancer Screening       Consider Hep C screening for all patients one time for ages 18-79 years       Advance Care Planning      BMI:   Estimated body mass index is 28.75 kg/m  as calculated from the following:    Height as of this encounter: 1.67 m (5' 5.75\").    Weight as of this encounter: 80.2 kg (176 lb 12.8 oz).   Weight management plan: Discussed healthy diet and exercise guidelines      She reports that she has been smoking cigarettes. She has a 30.00 pack-year smoking history. She has never used smokeless tobacco.  Nicotine/Tobacco Cessation Plan:   Information offered: Patient not interested at this time      Nena Stafford MD  [facility]  Answers for HPI/ROS submitted by the patient on 3/21/2023  If you checked off any problems, how difficult have these problems made it for you to do your work, take care of things at home, or get along with other people?: Not difficult at all  PHQ9 TOTAL SCORE: 1  YOLANDA 7 TOTAL SCORE: 0      "

## 2023-04-26 DIAGNOSIS — I10 HYPERTENSION, UNSPECIFIED TYPE: ICD-10-CM

## 2023-04-30 RX ORDER — HYDROCHLOROTHIAZIDE 25 MG/1
TABLET ORAL
Qty: 90 TABLET | Refills: 3 | Status: SHIPPED | OUTPATIENT
Start: 2023-04-30 | End: 2024-04-09

## 2023-05-02 ENCOUNTER — TRANSFERRED RECORDS (OUTPATIENT)
Dept: HEALTH INFORMATION MANAGEMENT | Facility: HOSPITAL | Age: 53
End: 2023-05-02

## 2023-09-10 ENCOUNTER — HEALTH MAINTENANCE LETTER (OUTPATIENT)
Age: 53
End: 2023-09-10

## 2023-10-02 ENCOUNTER — MYC MEDICAL ADVICE (OUTPATIENT)
Dept: FAMILY MEDICINE | Facility: OTHER | Age: 53
End: 2023-10-02
Payer: COMMERCIAL

## 2023-12-05 ENCOUNTER — OFFICE VISIT (OUTPATIENT)
Dept: FAMILY MEDICINE | Facility: OTHER | Age: 53
End: 2023-12-05
Attending: FAMILY MEDICINE
Payer: COMMERCIAL

## 2023-12-05 VITALS
OXYGEN SATURATION: 97 % | HEART RATE: 81 BPM | WEIGHT: 184.4 LBS | RESPIRATION RATE: 14 BRPM | HEIGHT: 66 IN | TEMPERATURE: 97.6 F | BODY MASS INDEX: 29.63 KG/M2 | DIASTOLIC BLOOD PRESSURE: 80 MMHG | SYSTOLIC BLOOD PRESSURE: 130 MMHG

## 2023-12-05 DIAGNOSIS — I25.10 ASCVD (ARTERIOSCLEROTIC CARDIOVASCULAR DISEASE): ICD-10-CM

## 2023-12-05 DIAGNOSIS — E55.9 VITAMIN D DEFICIENCY: ICD-10-CM

## 2023-12-05 DIAGNOSIS — R68.89 ABNORMAL ANKLE BRACHIAL INDEX (ABI): ICD-10-CM
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
## 2023-12-05 DIAGNOSIS — I70.213 INTERMITTENT CLAUDICATION OF BOTH LOWER EXTREMITIES DUE TO ATHEROSCLEROSIS (H): ICD-10-CM

## 2023-12-05 PROCEDURE — 99213 OFFICE O/P EST LOW 20 MIN: CPT | Performed by: FAMILY MEDICINE

## 2023-12-05 RX ORDER — CILOSTAZOL 100 MG/1
100 TABLET ORAL 2 TIMES DAILY
Qty: 180 TABLET | Refills: 3 | Status: SHIPPED | OUTPATIENT
Start: 2023-12-05 | End: 2024-04-09

## 2023-12-05 ASSESSMENT — PAIN SCALES - GENERAL: PAINLEVEL: SEVERE PAIN (6)

## 2023-12-05 NOTE — PROGRESS NOTES
"    Assessment & Plan       ICD-10-CM    1. Fall down stairs, sequela  W10.8XXS CT Pelvis Bone wo Contrast     Physical Therapy Referral      2. Intermittent claudication of both lower extremities due to atherosclerosis (H24)  I70.213 cilostazol (PLETAL) 100 MG tablet      3. Abnormal ankle brachial index (GABBIE)  R68.89 cilostazol (PLETAL) 100 MG tablet      4. ASCVD (arteriosclerotic cardiovascular disease)  I25.10 cilostazol (PLETAL) 100 MG tablet      5. Vitamin D deficiency  E55.9 vitamin D3 (D 5000) 125 MCG (5000 UT) tablet           Given patient's ongoing symptoms, my concern would be for pelvic effects.  Order placed for CT of pelvis.  Also consider other general musculoskeletal injury related to her fall.  Spinal compression fracture considered.  Referred for physical therapy  Over the counter  analgesics, heat, ice prn  Patient in need of refills of pletal for PVD.  Prescription sent  Patient in need of refill of vitamin D, prescription sen     PDMP Review       None                     BMI:   Estimated body mass index is 29.99 kg/m  as calculated from the following:    Height as of this encounter: 1.67 m (5' 5.75\").    Weight as of this encounter: 83.6 kg (184 lb 6.4 oz).           No follow-ups on file.    ASIYA BLANKENSHIP MD  [facility]    Subjective   Rose Mendez is a 53 year old female  presenting for the following health issues: Nursing Notes:   Liz Rodríguez LPN  12/5/2023  3:21 PM  Signed  Chief Complaint   Patient presents with    Musculoskeletal Problem     R thigh/hip pain - fall 10/23/23       Initial /80 (BP Location: Right arm, Patient Position: Sitting, Cuff Size: Adult Regular)   Pulse 81   Temp 97.6  F (36.4  C) (Temporal)   Resp 14   Ht 1.67 m (5' 5.75\")   Wt 83.6 kg (184 lb 6.4 oz)   LMP  (LMP Unknown)   SpO2 97%   Breastfeeding No   BMI 29.99 kg/m   Estimated body mass index is 29.99 kg/m  as calculated from the following:    Height as " "of this encounter: 1.67 m (5' 5.75\").    Weight as of this encounter: 83.6 kg (184 lb 6.4 oz).  Medication Review: complete    The next two questions are to help us understand your food security.  If you are feeling you need any assistance in this area, we have resources available to support you today.          12/5/2023   SDOH- Food Insecurity   Within the past 12 months, did you worry that your food would run out before you got money to buy more? N   Within the past 12 months, did the food you bought just not last and you didn t have money to get more? N       Health Care Directive:  Patient does not have a Health Care Directive or Living Will: Discussed advance care planning with patient; however, patient declined at this time.    Liz Rodríguez LPN                                 HPI Rose Mendez is a 53 year old female presents for right hip pain.  She had fallen on 10/23/23.  Fell down stairs at home.  She was carrying a laundry basket downstairs - slipped on the stair that turns and went down the last two stairs.  No bruising then.  It now seems to be worse.  States notices this with cold weather.    Activity that makes her feel worse:   Sliding in and out of her car.  Picking up her foot to put it on the gas.  She is limping.  Worse with first getting up.  Taking aleve now for her symptoms.        No past history of injury to this leg.      Patient is on pletal for pvd.  Also takes aspirin and Crestor.      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 " "    Past Medical History:   Diagnosis Date    ADHD (attention deficit hyperactivity disorder), combined type 11/25/2018    Diagnostic assessment completed by Lena Ram [facility] at [facility] services 10/2018--see scans    Adjustment disorder with mixed anxiety and depressed mood 2/16/2018    Overview:  with mood disorder    Adverse effect of anesthetic     08/01/2005,Notes prior complications from anesthesia:  (08/01/2005) Nausea    Dependence on other enabling machines and devices     7/10/2015,Sleep studies and followup per Dr Alcantara 7/2015    Disturbance of skin sensation     left side, resolved. Normal MRI    Ganglion of wrist     9/3/2014    Injury of ulnar nerve of forearm     (left Dr Riggs    Nontoxic multinodular goiter     1/19/2015    Osteoarthritis of first carpometacarpal joint     8/27/2014    Other fracture of left lower leg, initial encounter for closed fracture     No Comments Provided    Personal history of other medical treatment (CODE)     20 c- sections    Vitamin D deficiency     2/13/2015               Review of Systems   PVD - on pletal         3/8/2021     3:46 PM 5/27/2021     3:02 PM 3/21/2023     3:14 PM   PHQ   PHQ-9 Total Score 0 1 1   Q9: Thoughts of better off dead/self-harm past 2 weeks Not at all Not at all Not at all         5/27/2021     3:02 PM 8/4/2021    11:06 AM 3/21/2023     3:15 PM   YOLANDA-7 SCORE   Total Score   0 (minimal anxiety)   Total Score 3 0 0             Objective  /80 (BP Location: Right arm, Patient Position: Sitting, Cuff Size: Adult Regular)   Pulse 81   Temp 97.6  F (36.4  C) (Temporal)   Resp 14   Ht 1.67 m (5' 5.75\")   Wt 83.6 kg (184 lb 6.4 oz)   LMP  (LMP Unknown)   SpO2 97%   Breastfeeding No   BMI 29.99 kg/m     Physical Exam   GENERAL: healthy, alert and no distress  MS: tenderness of right glute/posterior pelvis; pretty normal hip ROM ; no ecchymosis     No results found for any visits on 12/05/23.      "     Answers submitted by the patient for this visit:  General Questionnaire (Submitted on 12/5/2023)  Chief Complaint: Chronic problems general questions HPI Form  How many servings of fruits and vegetables do you eat daily?: 0-1  On average, how many sweetened beverages do you drink each day (Examples: soda, juice, sweet tea, etc.  Do NOT count diet or artificially sweetened beverages)?: 0  How many minutes a day do you exercise enough to make your heart beat faster?: 9 or less  How many days a week do you exercise enough to make your heart beat faster?: 3 or less  How many days per week do you miss taking your medication?: 0  General Concern (Submitted on 12/5/2023)  Chief Complaint: Chronic problems general questions HPI Form  What is the reason for your visit today?: hip injury  When did your symptoms begin?: More than a month

## 2023-12-05 NOTE — NURSING NOTE
"Chief Complaint   Patient presents with    Musculoskeletal Problem     R thigh/hip pain - fall 10/23/23       Initial /80 (BP Location: Right arm, Patient Position: Sitting, Cuff Size: Adult Regular)   Pulse 81   Temp 97.6  F (36.4  C) (Temporal)   Resp 14   Ht 1.67 m (5' 5.75\")   Wt 83.6 kg (184 lb 6.4 oz)   LMP  (LMP Unknown)   SpO2 97%   Breastfeeding No   BMI 29.99 kg/m   Estimated body mass index is 29.99 kg/m  as calculated from the following:    Height as of this encounter: 1.67 m (5' 5.75\").    Weight as of this encounter: 83.6 kg (184 lb 6.4 oz).  Medication Review: complete    The next two questions are to help us understand your food security.  If you are feeling you need any assistance in this area, we have resources available to support you today.          12/5/2023   SDOH- Food Insecurity   Within the past 12 months, did you worry that your food would run out before you got money to buy more? N   Within the past 12 months, did the food you bought just not last and you didn t have money to get more? N       Health Care Directive:  Patient does not have a Health Care Directive or Living Will: Discussed advance care planning with patient; however, patient declined at this time.    Liz Rodríguez LPN      "

## 2023-12-13 ENCOUNTER — HOSPITAL ENCOUNTER (OUTPATIENT)
Dept: CT IMAGING | Facility: OTHER | Age: 53
Discharge: HOME | End: 2023-12-13
Attending: FAMILY MEDICINE | Admitting: FAMILY MEDICINE
Payer: COMMERCIAL
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
## 2023-12-13 PROCEDURE — 72192 CT PELVIS W/O DYE: CPT

## 2024-04-08 SDOH — HEALTH STABILITY: PHYSICAL HEALTH: ON AVERAGE, HOW MANY DAYS PER WEEK DO YOU ENGAGE IN MODERATE TO STRENUOUS EXERCISE (LIKE A BRISK WALK)?: 0 DAYS

## 2024-04-08 SDOH — HEALTH STABILITY: PHYSICAL HEALTH: ON AVERAGE, HOW MANY MINUTES DO YOU ENGAGE IN EXERCISE AT THIS LEVEL?: 0 MIN

## 2024-04-08 ASSESSMENT — ANXIETY QUESTIONNAIRES
8. IF YOU CHECKED OFF ANY PROBLEMS, HOW DIFFICULT HAVE THESE MADE IT FOR YOU TO DO YOUR WORK, TAKE CARE OF THINGS AT HOME, OR GET ALONG WITH OTHER PEOPLE?: NOT DIFFICULT AT ALL
7. FEELING AFRAID AS IF SOMETHING AWFUL MIGHT HAPPEN: NOT AT ALL
GAD7 TOTAL SCORE: 0
5. BEING SO RESTLESS THAT IT IS HARD TO SIT STILL: NOT AT ALL
7. FEELING AFRAID AS IF SOMETHING AWFUL MIGHT HAPPEN: NOT AT ALL
3. WORRYING TOO MUCH ABOUT DIFFERENT THINGS: NOT AT ALL
GAD7 TOTAL SCORE: 0
6. BECOMING EASILY ANNOYED OR IRRITABLE: NOT AT ALL
GAD7 TOTAL SCORE: 0
2. NOT BEING ABLE TO STOP OR CONTROL WORRYING: NOT AT ALL
4. TROUBLE RELAXING: NOT AT ALL
1. FEELING NERVOUS, ANXIOUS, OR ON EDGE: NOT AT ALL
IF YOU CHECKED OFF ANY PROBLEMS ON THIS QUESTIONNAIRE, HOW DIFFICULT HAVE THESE PROBLEMS MADE IT FOR YOU TO DO YOUR WORK, TAKE CARE OF THINGS AT HOME, OR GET ALONG WITH OTHER PEOPLE: NOT DIFFICULT AT ALL

## 2024-04-08 ASSESSMENT — SOCIAL DETERMINANTS OF HEALTH (SDOH): HOW OFTEN DO YOU GET TOGETHER WITH FRIENDS OR RELATIVES?: TWICE A WEEK

## 2024-04-08 ASSESSMENT — PATIENT HEALTH QUESTIONNAIRE - PHQ9
10. IF YOU CHECKED OFF ANY PROBLEMS, HOW DIFFICULT HAVE THESE PROBLEMS MADE IT FOR YOU TO DO YOUR WORK, TAKE CARE OF THINGS AT HOME, OR GET ALONG WITH OTHER PEOPLE: SOMEWHAT DIFFICULT
SUM OF ALL RESPONSES TO PHQ QUESTIONS 1-9: 2
SUM OF ALL RESPONSES TO PHQ QUESTIONS 1-9: 2

## 2024-04-08 NOTE — COMMUNITY RESOURCES LIST (ENGLISH)
April 8, 2024           YOUR PERSONALIZED LIST OF SERVICES & PROGRAMS           SPORTS & RECREATION    Individual/Team Sports      [facility] - Sports clubs and recreational activities  [address] [place] [address] (Distance: 1.0 miles)  Phone: [phone]  Website: https://Squee/  Language: English  Fee: Sliding scale, Self pay      [employer] - LITTLE LEAGUE BASEBALL AND SOFTBALL  Website: http://wwwElonics    Exercise Classes/Groups      [facility] - Personal training  [address] [place] [address] (Distance: 1.0 miles)  Phone: [phone]  Website: [email or web address]/  Language: English  Fee: Sliding scale, Self pay      [facility] - Gym or workout facility  [address] [place] [address] (Distance: 1.0 miles)  Phone: [phone]  Website: [email or web address]://Squee/  Language: English  Fee: Sliding scale, Self pay      [facility] - Adult Enrichment  Phone: [phone]  Website: [email or web address]-seniors/adult-enrichment/  Language: English  Hours: Mon 7:30 AM - 4:00 PM Tue 7:30 AM - 4:00 PM Wed 7:30 AM - 4:00 PM Thu 7:30 AM - 4:00 PM Fri 7:30 AM - 4:00 PM               IMPORTANT NUMBERS & WEBSITES        Emergency Services  911  .   [facility]  211 http://211unitedway.org  .   Poison Control  (620) 396-5406 http://[place]poison.org http://wisconsinpoison.org  .     Suicide and Crisis Lifeline  988 http://988lifeline.org  .   Childhelp National Child Abuse Hotline  278.121.6243 http://Childhelphotline.org   .   National Sexual Assault Hotline  (984) 864-9952 (HOPE) http://Rainn.org   .     National Runaway Safeline  (451) 380-4236 (RUNAWAY) http://1800runaActuatedMedical.org  .   Pregnancy & Postpartum Support  Call/text [phone]  [place]: http://ppsupportmn.org  [place]: http://psichapters.com/wi  .   Substance Abuse National Helpline ([facility])  564-266-WIXD (4840) http://Findtreatment.gov    .                DISCLAIMER: These resources have been generated via the [employer] Platform. [employer] does not endorse any service providers mentioned in this resource list. [employer] does not guarantee that the services mentioned in this resource list will be available to you or will improve your health or wellness.    [id number] [facility]

## 2024-04-09 ENCOUNTER — OFFICE VISIT (OUTPATIENT)
Dept: FAMILY MEDICINE | Facility: OTHER | Age: 54
End: 2024-04-09
Attending: NURSE PRACTITIONER
Payer: COMMERCIAL

## 2024-04-09 VITALS
TEMPERATURE: 98.4 F | SYSTOLIC BLOOD PRESSURE: 122 MMHG | OXYGEN SATURATION: 98 % | DIASTOLIC BLOOD PRESSURE: 74 MMHG | HEART RATE: 89 BPM | HEIGHT: 66 IN | BODY MASS INDEX: 29.22 KG/M2 | WEIGHT: 181.8 LBS | RESPIRATION RATE: 14 BRPM

## 2024-04-09 DIAGNOSIS — I70.213 INTERMITTENT CLAUDICATION OF BOTH LOWER EXTREMITIES DUE TO ATHEROSCLEROSIS (H): ICD-10-CM

## 2024-04-09 DIAGNOSIS — I10 HYPERTENSION, UNSPECIFIED TYPE: ICD-10-CM

## 2024-04-09 DIAGNOSIS — Z23 IMMUNIZATION DUE: ICD-10-CM

## 2024-04-09 DIAGNOSIS — Z23 NEED FOR PNEUMOCOCCAL 20-VALENT CONJUGATE VACCINATION: ICD-10-CM

## 2024-04-09 DIAGNOSIS — R68.89 ABNORMAL ANKLE BRACHIAL INDEX (ABI): ICD-10-CM

## 2024-04-09 DIAGNOSIS — M26.609 TMJ (TEMPOROMANDIBULAR JOINT SYNDROME): ICD-10-CM

## 2024-04-09 DIAGNOSIS — Z00.00 ROUTINE GENERAL MEDICAL EXAMINATION AT A HEALTH CARE FACILITY: Primary | ICD-10-CM

## 2024-04-09 DIAGNOSIS — E78.5 DYSLIPIDEMIA: ICD-10-CM

## 2024-04-09 DIAGNOSIS — Z87.891 PERSONAL HISTORY OF TOBACCO USE: ICD-10-CM

## 2024-04-09 DIAGNOSIS — Z12.31 ENCOUNTER FOR SCREENING MAMMOGRAM FOR BREAST CANCER: ICD-10-CM

## 2024-04-09 DIAGNOSIS — F90.2 ADHD (ATTENTION DEFICIT HYPERACTIVITY DISORDER), COMBINED TYPE: ICD-10-CM

## 2024-04-09 DIAGNOSIS — Z71.6 ENCOUNTER FOR SMOKING CESSATION COUNSELING: ICD-10-CM

## 2024-04-09 DIAGNOSIS — G47.33 OSA ON CPAP: ICD-10-CM

## 2024-04-09 DIAGNOSIS — Z12.2 SCREENING FOR LUNG CANCER: ICD-10-CM

## 2024-04-09 DIAGNOSIS — I25.10 ASCVD (ARTERIOSCLEROTIC CARDIOVASCULAR DISEASE): ICD-10-CM

## 2024-04-09 DIAGNOSIS — F43.23 ADJUSTMENT DISORDER WITH MIXED ANXIETY AND DEPRESSED MOOD: ICD-10-CM

## 2024-04-09 LAB
ALBUMIN SERPL BCG-MCNC: 4.3 G/DL (ref 3.5–5.2)
ALP SERPL-CCNC: 109 U/L (ref 40–150)
ALT SERPL W P-5'-P-CCNC: 19 U/L (ref 0–50)
ANION GAP SERPL CALCULATED.3IONS-SCNC: 10 MMOL/L (ref 7–15)
AST SERPL W P-5'-P-CCNC: 21 U/L (ref 0–45)
BASOPHILS # BLD AUTO: 0.1 10E3/UL (ref 0–0.2)
BASOPHILS NFR BLD AUTO: 1 %
BILIRUB SERPL-MCNC: 0.5 MG/DL
BUN SERPL-MCNC: 9.7 MG/DL (ref 6–20)
CALCIUM SERPL-MCNC: 9.7 MG/DL (ref 8.6–10)
CHLORIDE SERPL-SCNC: 102 MMOL/L (ref 98–107)
CHOLEST SERPL-MCNC: 160 MG/DL
CREAT SERPL-MCNC: 0.81 MG/DL (ref 0.51–0.95)
DEPRECATED HCO3 PLAS-SCNC: 26 MMOL/L (ref 22–29)
EGFRCR SERPLBLD CKD-EPI 2021: 86 ML/MIN/1.73M2
EOSINOPHIL # BLD AUTO: 0.1 10E3/UL (ref 0–0.7)
EOSINOPHIL NFR BLD AUTO: 1 %
ERYTHROCYTE [DISTWIDTH] IN BLOOD BY AUTOMATED COUNT: 13.8 % (ref 10–15)
FASTING STATUS PATIENT QL REPORTED: YES
GLUCOSE SERPL-MCNC: 104 MG/DL (ref 70–99)
HCT VFR BLD AUTO: 46 % (ref 35–47)
HDLC SERPL-MCNC: 39 MG/DL
HGB BLD-MCNC: 14.7 G/DL (ref 11.7–15.7)
IMM GRANULOCYTES # BLD: 0.1 10E3/UL
IMM GRANULOCYTES NFR BLD: 0 %
LDLC SERPL CALC-MCNC: 103 MG/DL
LYMPHOCYTES # BLD AUTO: 2.3 10E3/UL (ref 0.8–5.3)
LYMPHOCYTES NFR BLD AUTO: 20 %
MCH RBC QN AUTO: 26.8 PG (ref 26.5–33)
MCHC RBC AUTO-ENTMCNC: 32 G/DL (ref 31.5–36.5)
MCV RBC AUTO: 84 FL (ref 78–100)
MONOCYTES # BLD AUTO: 1 10E3/UL (ref 0–1.3)
MONOCYTES NFR BLD AUTO: 8 %
NEUTROPHILS # BLD AUTO: 8.2 10E3/UL (ref 1.6–8.3)
NEUTROPHILS NFR BLD AUTO: 70 %
NONHDLC SERPL-MCNC: 121 MG/DL
NRBC # BLD AUTO: 0 10E3/UL
NRBC BLD AUTO-RTO: 0 /100
PLATELET # BLD AUTO: 250 10E3/UL (ref 150–450)
POTASSIUM SERPL-SCNC: 4.6 MMOL/L (ref 3.4–5.3)
PROT SERPL-MCNC: 7.6 G/DL (ref 6.4–8.3)
RBC # BLD AUTO: 5.49 10E6/UL (ref 3.8–5.2)
SODIUM SERPL-SCNC: 138 MMOL/L (ref 135–145)
TRIGL SERPL-MCNC: 92 MG/DL
TSH SERPL DL<=0.005 MIU/L-ACNC: 2.3 UIU/ML (ref 0.3–4.2)
WBC # BLD AUTO: 11.6 10E3/UL (ref 4–11)

## 2024-04-09 PROCEDURE — 90746 HEPB VACCINE 3 DOSE ADULT IM: CPT | Performed by: NURSE PRACTITIONER

## 2024-04-09 PROCEDURE — 99396 PREV VISIT EST AGE 40-64: CPT | Mod: 25 | Performed by: NURSE PRACTITIONER

## 2024-04-09 PROCEDURE — 84443 ASSAY THYROID STIM HORMONE: CPT | Mod: ZL | Performed by: NURSE PRACTITIONER

## 2024-04-09 PROCEDURE — 80053 COMPREHEN METABOLIC PANEL: CPT | Mod: ZL | Performed by: NURSE PRACTITIONER

## 2024-04-09 PROCEDURE — 36415 COLL VENOUS BLD VENIPUNCTURE: CPT | Mod: ZL | Performed by: NURSE PRACTITIONER

## 2024-04-09 PROCEDURE — 90471 IMMUNIZATION ADMIN: CPT | Performed by: NURSE PRACTITIONER

## 2024-04-09 PROCEDURE — 90677 PCV20 VACCINE IM: CPT | Performed by: NURSE PRACTITIONER

## 2024-04-09 PROCEDURE — 80061 LIPID PANEL: CPT | Mod: ZL | Performed by: NURSE PRACTITIONER

## 2024-04-09 PROCEDURE — 85025 COMPLETE CBC W/AUTO DIFF WBC: CPT | Mod: ZL | Performed by: NURSE PRACTITIONER

## 2024-04-09 PROCEDURE — G0296 VISIT TO DETERM LDCT ELIG: HCPCS | Performed by: NURSE PRACTITIONER

## 2024-04-09 PROCEDURE — 90472 IMMUNIZATION ADMIN EACH ADD: CPT | Performed by: NURSE PRACTITIONER

## 2024-04-09 RX ORDER — NICOTINE 21 MG/24HR
1 PATCH, TRANSDERMAL 24 HOURS TRANSDERMAL EVERY 24 HOURS
Qty: 14 PATCH | Refills: 0 | Status: SHIPPED | OUTPATIENT
Start: 2024-04-09 | End: 2024-04-23

## 2024-04-09 RX ORDER — HYDROCHLOROTHIAZIDE 25 MG/1
25 TABLET ORAL DAILY
Qty: 90 TABLET | Refills: 4 | Status: SHIPPED | OUTPATIENT
Start: 2024-04-09

## 2024-04-09 RX ORDER — CILOSTAZOL 100 MG/1
100 TABLET ORAL 2 TIMES DAILY
Qty: 180 TABLET | Refills: 4 | Status: SHIPPED | OUTPATIENT
Start: 2024-04-09

## 2024-04-09 RX ORDER — NICOTINE 21 MG/24HR
1 PATCH, TRANSDERMAL 24 HOURS TRANSDERMAL EVERY 24 HOURS
Qty: 28 PATCH | Refills: 3 | Status: SHIPPED | OUTPATIENT
Start: 2024-04-09 | End: 2024-04-09

## 2024-04-09 RX ORDER — ROSUVASTATIN CALCIUM 20 MG/1
20 TABLET, COATED ORAL AT BEDTIME
Qty: 90 TABLET | Refills: 4 | Status: SHIPPED | OUTPATIENT
Start: 2024-04-09

## 2024-04-09 ASSESSMENT — PAIN SCALES - GENERAL: PAINLEVEL: NO PAIN (0)

## 2024-04-09 NOTE — PATIENT INSTRUCTIONS
Will be in touch with lab results.   Meds refilled.   Mammogram and lung cancer screening orders placed   Vaccine updated today - pneumonia and hepatitis b. Disregard polio vaccine.   Nicotine patches sent to the [facility]  Sleep medicine referral placed  7. Will get you referred to appropriate location for severe TMJ as well.   Preventive Care Advice   This is general advice given by our system to help you stay healthy. However, your care team may have specific advice just for you. Please talk to your care team about your preventive care needs.  Nutrition  Eat 5 or more servings of fruits and vegetables each day.  Try wheat bread, brown rice and whole grain pasta (instead of white bread, rice, and pasta).  Get enough calcium and vitamin D. Check the label on foods and aim for 100% of the RDA (recommended daily allowance).  Lifestyle  Exercise at least 150 minutes each week   (30 minutes a day, 5 days a week).  Do muscle strengthening activities 2 days a week. These help control your weight and prevent disease.  No smoking.  Wear sunscreen to prevent skin cancer.  Have a dental exam and cleaning every 6 months.  Yearly exams  See your health care team every year to talk about:  Any changes in your health.  Any medicines your care team has prescribed.  Preventive care, family planning, and ways to prevent chronic diseases.  Shots (vaccines)   HPV shots (up to age 26), if you've never had them before.  Hepatitis B shots (up to age 59), if you've never had them before.  COVID-19 shot: Get this shot when it's due.  Flu shot: Get a flu shot every year.  Tetanus shot: Get a tetanus shot every 10 years.  Pneumococcal, hepatitis A, and RSV shots: Ask your care team if you need these based on your risk.  Shingles shot (for age 50 and up).  General health tests  Diabetes screening:  Starting at age 35, Get screened for diabetes at least every 3 years.  If you are younger than age 35, ask your care team if you should be screened  for diabetes.  Cholesterol test: At age 39, start having a cholesterol test every 5 years, or more often if advised.  Bone density scan (DEXA): At age 50, ask your care team if you should have this scan for osteoporosis (brittle bones).  Hepatitis C: Get tested at least once in your life.  STIs (sexually transmitted infections)  Before age 24: Ask your care team if you should be screened for STIs.  After age 24: Get screened for STIs if you're at risk. You are at risk for STIs (including HIV) if:  You are sexually active with more than one person.  You don't use condoms every time.  You or a partner was diagnosed with a sexually transmitted infection.  If you are at risk for HIV, ask about PrEP medicine to prevent HIV.  Get tested for HIV at least once in your life, whether you are at risk for HIV or not.  Cancer screening tests  Cervical cancer screening: If you have a cervix, begin getting regular cervical cancer screening tests at age 21. Most people who have regular screenings with normal results can stop after age 65. Talk about this with your provider.  Breast cancer scan (mammogram): If you've ever had breasts, begin having regular mammograms starting at age 40. This is a scan to check for breast cancer.  Colon cancer screening: It is important to start screening for colon cancer at age 45.  Have a colonoscopy test every 10 years (or more often if you're at risk) Or, ask your provider about stool tests like a FIT test every year or Cologuard test every 3 years.  To learn more about your testing options, visit: https://www.[employer]/187790.pdf.  For help making a decision, visit: https://bit.ly/ba98369.  Prostate cancer screening test: If you have a prostate and are age 55 to 69, ask your provider if you would benefit from a yearly prostate cancer screening test.  Lung cancer screening: If you are a current or former smoker age 50 to 80, ask your care team if ongoing lung cancer screenings are right for  you.  For informational purposes only. Not to replace the advice of your health care provider. Copyright   2023 [facility]. All rights reserved. Clinically reviewed by the [facility] Transitions Program. Exit41 [id number] - REV 01/24.    Preventing Falls: Care Instructions  Injuries and health problems such as trouble walking or poor eyesight can increase your risk of falling. So can some medicines. But there are things you can do to help prevent falls. You can exercise to get stronger. You can also arrange your home to make it safer.    Talk to your doctor about the medicines you take. Ask if any of them increase the risk of falls and whether they can be changed or stopped.   Try to exercise regularly. It can help improve your strength and balance. This can help lower your risk of falling.     Practice fall safety and prevention.    Wear low-heeled shoes that fit well and give your feet good support. Talk to your doctor if you have foot problems that make this hard.  Carry a cellphone or wear a medical alert device that you can use to call for help.  Use stepladders instead of chairs to reach high objects. Don't climb if you're at risk for falls. Ask for help, if needed.  Wear the correct eyeglasses, if you need them.    Make your home safer.    Remove rugs, cords, clutter, and furniture from walkways.  Keep your house well lit. Use night-lights in hallways and bathrooms.  Install and use sturdy handrails on stairways.  Wear nonskid footwear, even inside. Don't walk barefoot or in socks without shoes.    Be safe outside.    Use handrails, curb cuts, and ramps whenever possible.  Keep your hands free by using a shoulder bag or backpack.  Try to walk in well-lit areas. Watch out for uneven ground, changes in pavement, and debris.  Be careful in the winter. Walk on the grass or gravel when sidewalks are slippery. Use de-icer on steps and walkways. Add non-slip devices to shoes.    Put grab bars  "and nonskid mats in your shower or tub and near the toilet. Try to use a shower chair or bath bench when bathing.   Get into a tub or shower by putting in your weaker leg first. Get out with your strong side first. Have a phone or medical alert device in the bathroom with you.   Where can you learn more?  Go to https://www.[employer].Affinity.is/patiented  Enter G117 in the search box to learn more about \"Preventing Falls: Care Instructions.\"  Current as of: July 17, 2023               Content Version: 14.0    0124-3487 path intelligence.   Care instructions adapted under license by your healthcare professional. If you have questions about a medical condition or this instruction, always ask your healthcare professional. path intelligence disclaims any warranty or liability for your use of this information.      Lung Cancer Screening   Frequently Asked Questions  If you are at high-risk for lung cancer, getting screened with low-dose computed tomography (LDCT) every year can help save your life. This handout offers answers to some of the most common questions about lung cancer screening. If you have other questions, please call [phone] ([phone]).     What is it?  Lung cancer screening uses special X-ray technology to create an image of your lung tissue. The exam is quick and easy and takes less than 10 seconds. We don t give you any medicine or use any needles. You can eat before and after the exam. You don t need to change your clothes as long as the clothing on your chest doesn t contain metal. But, you do need to be able to hold your breath for at least 6 seconds during the exam.    What is the goal of lung cancer screening?  The goal of lung cancer screening is to save lives. Many times, lung cancer is not found until a person starts having physical symptoms. Lung cancer screening can help detect lung cancer in the earliest stages when it may be easier to treat.    Who should be screened " for lung cancer?  We suggest lung cancer screening for anyone who is at high-risk for lung cancer. You are in the high-risk group if you:      are between the ages of 55 and 79, and    have smoked at least 1 pack of cigarettes a day for 20 or more years, and    still smoke or have quit within the past 15 years.    However, if you have a new cough or shortness of breath, you should talk to your doctor before being screened.    Why does it matter if I have symptoms?  Certain symptoms can be a sign that you have a condition in your lungs that should be checked and treated by your doctor. These symptoms include fever, chest pain, a new or changing cough, shortness of breath that you have never felt before, coughing up blood or unexplained weight loss. Having any of these symptoms can greatly affect the results of lung cancer screening.       Should all smokers get an LDCT lung cancer screening exam?  It depends. Lung cancer screening is for a very specific group of men and women who have a history of heavy smoking over a long period of time (see  Who should be screened for lung cancer  above).  I am in the high-risk group, but have been diagnosed with cancer in the past. Is LDCT lung cancer screening right for me?  In some cases, you should not have LDCT lung screening, such as when your doctor is already following your cancer with CT scan studies. Your doctor will help you decide if LDCT lung screening is right for you.  Do I need to have a screening exam every year?  Yes. If you are in the high-risk group described earlier, you should get an LDCT lung cancer screening exam every year until you are 79, or are no longer willing or able to undergo screening and possible procedures to diagnose and treat lung cancer.  How effective is LDCT at preventing death from lung cancer?  Studies have shown that LDCT lung cancer screening can lower the risk of death from lung cancer by 20 percent in people who are at high-risk.  What  are the risks?  There are some risks and limitations of LDCT lung cancer screening. We want to make sure you understand the risks and benefits, so please let us know if you have any questions. Your doctor may want to talk with you more about these risks.    Radiation exposure: As with any exam that uses radiation, there is a very small increased risk of cancer. The amount of radiation in LDCT is small--about the same amount a person would get from a mammogram. Your doctor orders the exam when he or she feels the potential benefits outweigh the risks.    False negatives: No test is perfect, including LDCT. It is possible that you may have a medical condition, including lung cancer, that is not found during your exam. This is called a false negative result.    False positives and more testing: LDCT very often finds something in the lung that could be cancer, but in fact is not. This is called a false positive result. False positive tests often cause anxiety. To make sure these findings are not cancer, you may need to have more tests. These tests will be done only if you give us permission. Sometimes patients need a treatment that can have side effects, such as a biopsy. For more information on false positives, see  What can I expect from the results?     Findings not related to lung cancer: Your LDCT exam also takes pictures of areas of your body next to your lungs. In a very small number of cases, the CT scan will show an abnormal finding in one of these areas, such as your kidneys, adrenal glands, liver or thyroid. This finding may not be serious, but you may need more tests. Your doctor can help you decide what other tests you may need, if any.  What can I expect from the results?  About 1 out of 4 LDCT exams will find something that may need more tests. Most of the time, these findings are lung nodules. Lung nodules are very small collections of tissue in the lung. These nodules are very common, and the vast  majority--more than 97 percent--are not cancer (benign). Most are normal lymph nodes or small areas of scarring from past infections.  But, if a small lung nodule is found to be cancer, the cancer can be cured more than 90 percent of the time. To know if the nodule is cancer, we may need to get more images before your next yearly screening exam. If the nodule has suspicious features (for example, it is large, has an odd shape or grows over time), we will refer you to a specialist for further testing.  Will my doctor also get the results?  Yes. Your doctor will get a copy of your results.  Is it okay to keep smoking now that there s a cancer screening exam?  No. Tobacco is one of the strongest cancer-causing agents. It causes not only lung cancer, but other cancers and cardiovascular (heart) diseases as well. The damage caused by smoking builds over time. This means that the longer you smoke, the higher your risk of disease. While it is never too late to quit, the sooner you quit, the better.  Where can I find help to quit smoking?  The best way to prevent lung cancer is to stop smoking. If you have already quit smoking, congratulations and keep it up! For help on quitting smoking, please call QuitMMIS at 8-806-QUITNOW (1-592.791.8163) or the American Cancer Society at 1-475.611.5664 to find local resources near you.  One-on-one health coaching:  If you d prefer to work individually with a health care provider on tobacco cessation, we offer:      Medication Therapy Management:  Our specially trained pharmacists work closely with you and your doctor to help you quit smoking.  Call [phone] or [phone] (toll free).

## 2024-04-09 NOTE — NURSING NOTE
"Chief Complaint   Patient presents with    Physical       Initial /74   Pulse 89   Temp 98.4  F (36.9  C) (Tympanic)   Resp 14   Ht 1.676 m (5' 6\")   Wt 82.5 kg (181 lb 12.8 oz)   LMP 07/01/2010 (Approximate)   SpO2 98%   Breastfeeding No   BMI 29.34 kg/m   Estimated body mass index is 29.34 kg/m  as calculated from the following:    Height as of this encounter: 1.676 m (5' 6\").    Weight as of this encounter: 82.5 kg (181 lb 12.8 oz).  Medication Review: complete    The next two questions are to help us understand your food security.  If you are feeling you need any assistance in this area, we have resources available to support you today.          4/8/2024   SDOH- Food Insecurity   Within the past 12 months, did you worry that your food would run out before you got money to buy more? N   Within the past 12 months, did the food you bought just not last and you didn t have money to get more? N         Health Care Directive:  Patient does not have a Health Care Directive or Living Will: Discussed advance care planning with patient; however, patient declined at this time.    Kiya Greenwood CMA    Immunization Documentation    Prior to Immunization administration, verified patients identity using patient's name and date of birth. Please see IMMUNIZATIONS  and order for additional information.  Patient / Parent instructed to remain in clinic for 15 minutes and report any adverse reaction to staff immediately.    Was the entire amount of vaccines given used? Yes    Kiya Greenwood CMA  4/9/2024   9:06 AM        "

## 2024-04-09 NOTE — PROGRESS NOTES
Preventive Care Visit  [facility]  Kiya Ray NP, Family Medicine  Apr 9, 2024      Assessment & Plan   Problem List Items Addressed This Visit       Adjustment disorder with mixed anxiety and depressed mood    MERCEDES on CPAP    Relevant Orders    Adult Sleep Eval & Management  Referral    ADHD (attention deficit hyperactivity disorder), combined type     Other Visit Diagnoses       Routine general medical examination at a health care facility    -  Primary    Relevant Orders    Comprehensive Metabolic Panel (Completed)    TSH Reflex [facility] (Completed)    CBC and Differential (Completed)    Lipid Panel (Completed)    Hypertension, unspecified type        Relevant Medications    hydrochlorothiazide (HYDRODIURIL) 25 MG tablet    Intermittent claudication of both lower extremities due to atherosclerosis (H24)        Relevant Medications    rosuvastatin (CRESTOR) 20 MG tablet    cilostazol (PLETAL) 100 MG tablet    ASCVD (arteriosclerotic cardiovascular disease)        Relevant Medications    rosuvastatin (CRESTOR) 20 MG tablet    cilostazol (PLETAL) 100 MG tablet    Dyslipidemia        Relevant Medications    rosuvastatin (CRESTOR) 20 MG tablet    TMJ (temporomandibular joint syndrome)        Relevant Orders    Adult ENT  Referral    Need for pneumococcal 20-valent conjugate vaccination        Relevant Orders    PNEUMOCOCCAL 20 VALENT CONJUGATE (PREVNAR 20) (Completed)    Immunization due        Relevant Orders    [facility] IMM-  HEPATITIS B VACCINE, ADULT, IM (Completed)    Screening for lung cancer        Relevant Orders    CT Chest Lung Cancer Scrn Low Dose wo    Abnormal ankle brachial index (GABBIE)        Relevant Medications    cilostazol (PLETAL) 100 MG tablet    Encounter for screening mammogram for breast cancer        Relevant Orders    MA Screen Bilateral w/Jaswant    Encounter for smoking cessation counseling        Relevant Medications    nicotine (NICODERM CQ) 21 MG/24HR 24 hr  patch    nicotine (NICODERM CQ) 14 MG/24HR 24 hr patch    nicotine (NICODERM CQ) 7 MG/24HR 24 hr patch           1. Routine general medical examination at a health care facility  - Comprehensive Metabolic Panel  - TSH Reflex [facility]  - CBC and Differential  - Lipid Panel    2. Hypertension, unspecified type  Stable blood pressure today, continue with hydrochlorothiazide.  - hydrochlorothiazide (HYDRODIURIL) 25 MG tablet; Take 1 tablet (25 mg) by mouth daily  Dispense: 90 tablet; Refill: 4    3. Intermittent claudication of both lower extremities due to atherosclerosis (H24)  4. ASCVD (arteriosclerotic cardiovascular disease)  5. Dyslipidemia  Stable lipid panel, improved from last check.  Continue with rosuvastatin.  - rosuvastatin (CRESTOR) 20 MG tablet; Take 1 tablet (20 mg) by mouth at bedtime  Dispense: 90 tablet; Refill: 4    6. MERCEDES on CPAP  CPAP machine is not working as it used to, new referral placed for sleep medicine, patient may require a new sleep study which she was made aware of.  - Adult Sleep Eval & Management  Referral; Future    7. TMJ (temporomandibular joint syndrome)  Will begin antibiotic recommended by her dentist, TMJ has been severe and dentist had recommended specialist in the [facility] but insurance would not cover.  I did place referral for ENT evaluation.  She will be getting a new mouthguard made by dentist.  - Adult ENT  Referral; Future    8. Need for pneumococcal 20-valent conjugate vaccination  - PNEUMOCOCCAL 20 VALENT CONJUGATE (PREVNAR 20)    9. Immunization due  - [facility] IMM-  HEPATITIS B VACCINE, ADULT, IM    10. Screening for lung cancer  Qualifies for low-dose CT screening, order placed.  - CT Chest Lung Cancer Scrn Low Dose wo; Future    11. Abnormal ankle brachial index (GABBIE)  This was initially prescribed by cardiology, patient has issues with insurance covering cardiology visits and has been having this medication refilled by PCP more recently..  - cilostazol  "(PLETAL) 100 MG tablet; Take 1 tablet (100 mg) by mouth 2 times daily  Dispense: 180 tablet; Refill: 4    12. Encounter for screening mammogram for breast cancer  No prior abnormal mammograms, she is due for this, last done in June 2022.  - MA Screen Bilateral w/Jaswant; Future    13. Encounter for smoking cessation counseling  - nicotine (NICODERM CQ) 21 MG/24HR 24 hr patch; Place 1 patch onto the skin every 24 hours for 14 days  Dispense: 14 patch; Refill: 0  - nicotine (NICODERM CQ) 14 MG/24HR 24 hr patch; Place 1 patch onto the skin every 24 hours for 14 days  Dispense: 14 patch; Refill: 0  - nicotine (NICODERM CQ) 7 MG/24HR 24 hr patch; Place 1 patch onto the skin every 24 hours for 56 days  Dispense: 56 patch; Refill: 0    14. Adjustment disorder with mixed anxiety and depressed mood  15. ADHD (attention deficit hyperactivity disorder), combined type  Plan management with [facility], this is a feels ADHD medications.  Continue Adderall as prescribed by other.      Patient has been advised of split billing requirements and indicates understanding: Yes       Nicotine/Tobacco Cessation  She reports that she has been smoking cigarettes. She has a 30 pack-year smoking history. She has been exposed to tobacco smoke. She has never used smokeless tobacco.  Nicotine/Tobacco Cessation Plan  Pharmacotherapies : Nicotine patch  Self help information given to patient      BMI  Estimated body mass index is 29.34 kg/m  as calculated from the following:    Height as of this encounter: 1.676 m (5' 6\").    Weight as of this encounter: 82.5 kg (181 lb 12.8 oz).   Weight management plan: Discussed healthy diet and exercise guidelines    Counseling  Appropriate preventive services were discussed with this patient, including applicable screening as appropriate for fall prevention, nutrition, physical activity, Tobacco-use cessation, weight loss and cognition.  Checklist reviewing preventive services available has been given " to the patient.  Reviewed patient's diet, addressing concerns and/or questions.       Return in about 53 weeks (around 4/15/2025) for Annual Wellness Visit.      Devonte Rose is a 53 year old, presenting for the following:  Physical        4/9/2024     8:03 AM   Additional Questions   Roomed by DOROTHY Huertas   Accompanied by Self        Health Care Directive  Patient does not have a Health Care Directive or Living Will: Discussed advance care planning with patient; however, patient declined at this time.    HPI    Concerns today:  CPAP machine - has not been working well for her for several years, feels like it blows too much air in and she cannot wear it due to that. Needs it, has sleep apnea. Has had this machine for many years - original machine. Hoping to get a new one, agreeable to a new sleep study if needed.     TMJ - severe, worse on the right than the left. Her insurance will not cover specialist that the dentist recommended. Has tried mouthguard, getting a new one. Has done PT for this as well without success. Bad teeth grinding. She is asking for ENT referral. This is bothersome.     No other concerns today. Feels well.     due for mammogram ; no history of breast cancer.   Colonoscopy in 2018; due in 2028.               4/8/2024   General Health   How would you rate your overall physical health? (!) FAIR   Feel stress (tense, anxious, or unable to sleep) Not at all         4/8/2024   Nutrition   Three or more servings of calcium each day? (!) NO   Diet: Regular (no restrictions)   How many servings of fruit and vegetables per day? (!) 0-1   How many sweetened beverages each day? 0-1         4/8/2024   Exercise   Days per week of moderate/strenous exercise 0 days   Average minutes spent exercising at this level 0 min   (!) EXERCISE CONCERN      4/8/2024   Social Factors   Frequency of gathering with friends or relatives Twice a week   Worry food won't last until get money to buy more No   Food not last  or not have enough money for food? No   Do you have housing?  Yes   Are you worried about losing your housing? No   Lack of transportation? No   Unable to get utilities (heat,electricity)? No         4/9/2024   Fall Risk   Gait Speed Test (Document in seconds) 4   Gait Speed Test Interpretation Less than or equal to 5.00 seconds - PASS          4/8/2024   Dental   Dentist two times every year? Yes         4/8/2024   TB Screening   Were you born outside of the [place]? No       Today's PHQ-9 Score:       4/8/2024    11:27 AM   PHQ-9 SCORE   PHQ-9 Total Score MyChart 2 (Minimal depression)   PHQ-9 Total Score 2         4/8/2024   Substance Use   Alcohol more than 3/day or more than 7/wk Not Applicable   Do you use any other substances recreationally? No     Social History     Tobacco Use    Smoking status: Every Day     Packs/day: 1.00     Years: 30.00     Additional pack years: 0.00     Total pack years: 30.00     Types: Cigarettes     Passive exposure: Current    Smokeless tobacco: Never   Vaping Use    Vaping Use: Never used   Substance Use Topics    Alcohol use: Yes     Alcohol/week: 0.0 standard drinks of alcohol     Comment: occ    Drug use: No             4/8/2024   Breast Cancer Screening   Family history of breast, colon, or ovarian cancer? No / Unknown         6/30/2022   LAST FHS-7 RESULTS   1st degree relative breast or ovarian cancer Yes   Any relative bilateral breast cancer No   Any male have breast cancer No   Any ONE woman have BOTH breast AND ovarian cancer No   Any woman with breast cancer before 50yrs No   2 or more relatives with breast AND/OR ovarian cancer No   2 or more relatives with breast AND/OR bowel cancer No        Due for mammogram - shared decision making. Will order.  Last had in June 2022 -         4/8/2024   STI Screening   New sexual partner(s) since last STI/HIV test? No     History of abnormal Pap smear: Status post benign hysterectomy. Health Maintenance and Surgical History  updated.    Ready to quit smoking!        ASCVD Risk   The 10-year ASCVD risk score (Alyssia BO, et al., 2019) is: 6.1%    Values used to calculate the score:      Age: 53 years      Sex: Female      Is Non- : No      Diabetic: No      Tobacco smoker: Yes      Systolic Blood Pressure: 122 mmHg      Is BP treated: Yes      HDL Cholesterol: 39 mg/dL      Total Cholesterol: 160 mg/dL         Reviewed and updated as needed this visit by Provider     Meds  Problems  Med Hx  Surg Hx  Fam Hx            Lab work is in process  Labs reviewed in EPIC  Patient Active Problem List   Diagnosis    Adjustment disorder with mixed anxiety and depressed mood    CMC arthritis, thumb, degenerative    Ganglion cyst of wrist    History of hysterectomy    Multiple thyroid nodules    MERCEDES on CPAP    Snoring    Tobacco abuse    Tobacco dependence    Vitamin D deficiency    Status post finger joint fusion    ADHD (attention deficit hyperactivity disorder), combined type     Past Surgical History:   Procedure Laterality Date    AS REVISE ULNAR NERVE AT WRIST Left 2005,[id number],IA REVISE ULNAR NERVE AT WRIST,left, [place]     SECTION           SECTION      ,with postpartum tubal sterilization Status post left elbow ulnar nerve transposition .    COLONOSCOPY N/A 3/1/2018    Procedure: COMBINED COLONOSCOPY, SINGLE OR MULTIPLE BIOPSY/POLYPECTOMY BY BIOPSY;  Colonoscopy;  Surgeon: Ramona Vegas MD;  Location: [facility] OR    HC ABLATION, ENDOMETRIAL, THERMAL, W/O HYSTEROSCOPIC GUIDANCE      5/4/10,791954,ABLATION,endometrial    HYSTERECTOMY VAGINAL      7/6/10,Laparoscopic Assisted Vaginal Hysterectomy       Social History     Tobacco Use    Smoking status: Every Day     Packs/day: 1.00     Years: 30.00     Additional pack years: 0.00     Total pack years: 30.00     Types: Cigarettes     Passive exposure: Current    Smokeless tobacco: Never   Substance Use Topics    Alcohol use: Yes      Alcohol/week: 0.0 standard drinks of alcohol     Comment: occ     Family History   Problem Relation Age of Onset    Family History Negative Daughter         Good Health    Family History Negative Daughter         Good Health    Family History Negative Daughter         Good Health    Prostate Cancer Father         Cancer-prostate    Seizure Disorder Father         head injury    Hypertension Mother         Hypertension    Diabetes Maternal Grandmother         Diabetes    Heart Disease Paternal Grandfather 62        Heart Disease,MI     Coronary Artery Disease Paternal Grandfather 62        Coronary artery disease    Hypertension Maternal Aunt         Hypertension    Diabetes Maternal Aunt         Diabetes    Breast Cancer Maternal Aunt 70        Cancer-breast    Coronary Artery Disease Maternal Aunt 60        Coronary artery disease    Seizure Disorder Sister         congenital hypoxia    Thyroid Disease Sister         Thyroid Disease    Hypertension Maternal Uncle         Hypertension    Diabetes Maternal Uncle         Diabetes    Coronary Artery Disease Maternal Uncle 40        Coronary artery disease    Diabetes Paternal Aunt         Diabetes    Diabetes Paternal Uncle         Diabetes    Allergy (Severe) No family hx of         Allergies    Anesthesia Reaction No family hx of         Anesthesia Problem    Blood Disease No family hx of         Blood Disease    Colon Cancer No family hx of         Cancer-colon    Ovarian Cancer No family hx of         Cancer-ovarian    Other - See Comments No family hx of         Stroke         Current Outpatient Medications   Medication Sig Dispense Refill    amphetamine-dextroamphetamine (ADDERALL XR) 20 MG 24 hr capsule Take 1 capsule by mouth every morning      amphetamine-dextroamphetamine (ADDERALL) 5 MG tablet Take 1 tablet by mouth daily Every early afternoon      aspirin (ASA) 81 MG EC tablet Take 1 tablet (81 mg) by mouth daily      cilostazol (PLETAL) 100 MG  "tablet Take 1 tablet (100 mg) by mouth 2 times daily 180 tablet 4    hydrochlorothiazide (HYDRODIURIL) 25 MG tablet Take 1 tablet (25 mg) by mouth daily 90 tablet 4    nicotine (NICODERM CQ) 14 MG/24HR 24 hr patch Place 1 patch onto the skin every 24 hours for 14 days 14 patch 0    nicotine (NICODERM CQ) 21 MG/24HR 24 hr patch Place 1 patch onto the skin every 24 hours for 14 days 14 patch 0    nicotine (NICODERM CQ) 7 MG/24HR 24 hr patch Place 1 patch onto the skin every 24 hours for 56 days 56 patch 0    rosuvastatin (CRESTOR) 20 MG tablet Take 1 tablet (20 mg) by mouth at bedtime 90 tablet 4    vitamin D3 (D 5000) 125 MCG (5000 UT) tablet Take 1 tablet (125 mcg) by mouth every 72 hours       No Known Allergies    Review of Systems  Constitutional, neuro, ENT, endocrine, pulmonary, cardiac, gastrointestinal, genitourinary, musculoskeletal, integument and psychiatric systems are negative, except as otherwise noted.     Objective    Exam  /74   Pulse 89   Temp 98.4  F (36.9  C) (Tympanic)   Resp 14   Ht 1.676 m (5' 6\")   Wt 82.5 kg (181 lb 12.8 oz)   LMP 07/01/2010 (Approximate)   SpO2 98%   Breastfeeding No   BMI 29.34 kg/m     Estimated body mass index is 29.34 kg/m  as calculated from the following:    Height as of this encounter: 1.676 m (5' 6\").    Weight as of this encounter: 82.5 kg (181 lb 12.8 oz).    Physical Exam  Vitals and nursing note reviewed.   Constitutional:       General: She is not in acute distress.     Appearance: Normal appearance. She is not toxic-appearing.   HENT:      Head: Normocephalic.      Jaw: Tenderness and pain on movement present.      Comments: Bilateral TMJ tenderness; right > left      Right Ear: Tympanic membrane, ear canal and external ear normal. There is no impacted cerumen.      Left Ear: Tympanic membrane, ear canal and external ear normal. There is no impacted cerumen.      Nose: Nose normal. No congestion or rhinorrhea.      Mouth/Throat:      Mouth: " Mucous membranes are moist.      Pharynx: No oropharyngeal exudate or posterior oropharyngeal erythema.   Eyes:      Extraocular Movements: Extraocular movements intact.      Pupils: Pupils are equal, round, and reactive to light.   Neck:      Vascular: No carotid bruit.   Cardiovascular:      Rate and Rhythm: Normal rate and regular rhythm.      Pulses: Normal pulses.      Heart sounds: Normal heart sounds. No murmur heard.     No friction rub. No gallop.   Pulmonary:      Effort: Pulmonary effort is normal. No respiratory distress.      Breath sounds: Normal breath sounds. No wheezing.   Abdominal:      General: Abdomen is flat. Bowel sounds are normal. There is no distension.      Palpations: Abdomen is soft. There is no mass.      Tenderness: There is no abdominal tenderness. There is no right CVA tenderness, left CVA tenderness, guarding or rebound.      Hernia: No hernia is present.   Musculoskeletal:         General: No swelling. Normal range of motion.      Cervical back: Normal range of motion and neck supple. No rigidity or tenderness.      Right lower leg: No edema.      Left lower leg: No edema.   Lymphadenopathy:      Cervical: No cervical adenopathy.   Skin:     General: Skin is warm and dry.      Capillary Refill: Capillary refill takes less than 2 seconds.      Coloration: Skin is not jaundiced or pale.      Findings: No rash.   Neurological:      General: No focal deficit present.      Mental Status: She is alert and oriented to person, place, and time.   Psychiatric:         Mood and Affect: Mood normal.         Behavior: Behavior normal.       Signed Electronically by: Kiya Ray NP    Answers submitted by the patient for this visit:  Patient Health Questionnaire (Submitted on 4/8/2024)  If you checked off any problems, how difficult have these problems made it for you to do your work, take care of things at home, or get along with other people?: Somewhat difficult  PHQ9 TOTAL SCORE:  2  YOLANDA-7 (Submitted on 4/8/2024)  YOLANDA 7 TOTAL SCORE: 0

## 2024-04-10 ENCOUNTER — MYC MEDICAL ADVICE (OUTPATIENT)
Dept: PULMONOLOGY | Facility: OTHER | Age: 54
End: 2024-04-10

## 2024-04-10 ASSESSMENT — SLEEP AND FATIGUE QUESTIONNAIRES
HOW LIKELY ARE YOU TO NOD OFF OR FALL ASLEEP WHILE WATCHING TV: SLIGHT CHANCE OF DOZING
HOW LIKELY ARE YOU TO NOD OFF OR FALL ASLEEP WHILE LYING DOWN TO REST IN THE AFTERNOON WHEN CIRCUMSTANCES PERMIT: WOULD NEVER DOZE
HOW LIKELY ARE YOU TO NOD OFF OR FALL ASLEEP WHILE SITTING INACTIVE IN A PUBLIC PLACE: WOULD NEVER DOZE
HOW LIKELY ARE YOU TO NOD OFF OR FALL ASLEEP WHILE SITTING QUIETLY AFTER LUNCH WITHOUT ALCOHOL: WOULD NEVER DOZE
HOW LIKELY ARE YOU TO NOD OFF OR FALL ASLEEP WHILE SITTING AND TALKING TO SOMEONE: WOULD NEVER DOZE
HOW LIKELY ARE YOU TO NOD OFF OR FALL ASLEEP IN A CAR, WHILE STOPPED FOR A FEW MINUTES IN TRAFFIC: WOULD NEVER DOZE
HOW LIKELY ARE YOU TO NOD OFF OR FALL ASLEEP WHILE SITTING AND READING: SLIGHT CHANCE OF DOZING
HOW LIKELY ARE YOU TO NOD OFF OR FALL ASLEEP WHEN YOU ARE A PASSENGER IN A CAR FOR AN HOUR WITHOUT A BREAK: SLIGHT CHANCE OF DOZING

## 2024-04-11 NOTE — PROGRESS NOTES
04/10/24 5027   Reason For Your Visit   Please briefly describe the main reason(s) for your sleep visit My cpap machine pushes too much air and after 4 hours my stomach is hard and causes burping   Approximately when did this problem start 2 years   What are your goals for this visit I want my machine adjusted   Time in Bed - Work Or School Days   Do you work or go to school Yes   What time do you usually get into bed 6 am   About how long does it take you to fall asleep Under 30 minutes usually   How often do you have trouble falling asleep 1x per week   How often do you wake up during the night 2-3   Do you work days/evenings/nights/rotating shifts Days   How often do you have trouble falling back to sleep 1x per month   About how long does it take to fall back to sleep Varies   What do you usually do if you have trouble getting back to sleep Lay there   What time do you usually get out of bed to start your day 6am   Do you use an alarm Yes   Time in Bed - Weekends/Non-work Days/All Other Days   What time do you usually get into bed 9pm to 10 pm   About how long does it take you to fall asleep 30 minutes   What time do you usually get out of bed to start your day 6am   Do you use an alarm Yes   Sleep Need   On average, about how much sleep do you think you get 6-7   About how much sleep do you think you need 6-7   Sleep Position   Which sleep positions do you prefer Side;Head Elevated   Do you do any of the following activities in bed Read   How often do you take a nap on purpose Never   Have you ever had a driving accident or near-miss due to sleepiness/drowsiness No   Sleep Disruptions - Breathing/Snoring   Do you snore Yes   Do other people complain about your snoring Yes   Do you have issues with any of the following Morning headaches;Stuffy nose when you wake up   Sleep Disruptions - Movement   Do you get pain, discomfort, with an urge to move No   Does it happen when you are resting No   Have you been  told you kick your legs at night No   Sleep Disruptions - Behaviours in Sleep   Have you ever experienced any of the following during your sleep Teeth grinding   Do you ever experience sudden muscle weakness during the day No   Caffeine, Alcohol and Other Substances   How many caffeinated beverages (coffee, tea, soda, energy drinks) per day Coffee 4-6   What time of day is your last caffeine use All day   List any prescribed or over the counter stimulants that you take Adderall morning and afternoon   Do you drink alcohol to help you sleep No   Do you drink alcohol near bedtime No   Family History   Has any family member been diagnosed with a sleep disorder Yes   If yes, please indicate Myself   In the last TWO WEEKS have you experienced any of the following symptoms?   Headaches in Morning Yes           4/10/2024     4:40 PM    [place] Sleepiness Scale ( CATHY Bethea  4589-4553<br>ESS - [place]/English - Final version - 21 Nov 07 - [facility] [place].)   Sitting and reading Slight chance of dozing   Watching TV Slight chance of dozing   Sitting, inactive in a public place (e.g. a theatre or a meeting) Would never doze   As a passenger in a car for an hour without a break Slight chance of dozing   Lying down to rest in the afternoon when circumstances permit Would never doze   Sitting and talking to someone Would never doze   Sitting quietly after a lunch without alcohol Would never doze   In a car, while stopped for a few minutes in traffic Would never doze   [place] Score (MC) 3   [place] Score (Sleep) 3         4/10/2024     4:38 PM   Insomnia Severity Index (JC)   Difficulty falling asleep 0   Difficulty staying asleep 1   Problems waking up too early 1   How SATISFIED/DISSATISFIED are you with your CURRENT sleep pattern? 1   How NOTICEABLE to others do you think your sleep problem is in terms of impairing the quality of your life? 1   How WORRIED/DISTRESSED are you about your current sleep problem? 1   To what  extent do you consider your sleep problem to INTERFERE with your daily functioning (e.g. daytime fatigue, mood, ability to function at work/daily chores, concentration, memory, mood, etc.) CURRENTLY? 1   JC Total Score 6         4/10/2024     4:39 PM   STOP BANG Questionnaire (  2008, the American Society of Anesthesiologists, Inc[employer]   1. Snoring - Do you snore loudly (louder than talking or loud enough to be heard through closed doors)? Yes   2. Tired - Do you often feel tired, fatigued, or sleepy during daytime? Yes   3. Observed - Has anyone observed you stop breathing during your sleep? Yes   4. Blood pressure - Do you have or are you being treated for high blood pressure? No   5. BMI - BMI more than 35 kg/m2? No   6. Age - Age over 50 yr old? Yes   7. Neck circumference - Neck circumference greater than 40 cm? No   8. Gender - Gender male? No   STOP BANG Score (MC): 5 (High risk of MERCEDES)

## 2024-04-19 NOTE — TELEPHONE ENCOUNTER
Chart review prior to sleep testing.    Patient Summary:  53 year old female who is referred for establishing care for MERCEDES and concern for excessive PAP settings.    Patient Active Problem List    Diagnosis Date Noted    ADHD (attention deficit hyperactivity disorder), combined type 11/25/2018     Priority: Medium     Diagnostic assessment completed by Lena Ram LP at [facility] psychological services 10/2018--see scans      Adjustment disorder with mixed anxiety and depressed mood 02/16/2018     Priority: Medium     Diagnostic assessment final impression--[facility] psychological services Lena Licona LP--10/2018      Tobacco abuse 02/16/2018     Priority: Medium    Tobacco dependence 02/10/2016     Priority: Medium    Status post finger joint fusion 12/15/2015     Priority: Medium    MERCEDES on CPAP 07/10/2015     Priority: Medium     Overview:   Sleep studies and followup per Dr Alcantara 7/2015      History of hysterectomy 02/25/2015     Priority: Medium    Vitamin D deficiency 02/13/2015     Priority: Medium    Snoring 02/12/2015     Priority: Medium    Multiple thyroid nodules 01/19/2015     Priority: Medium    Ganglion cyst of wrist 09/03/2014     Priority: Medium    CMC arthritis, thumb, degenerative 08/27/2014     Priority: Medium       Current Outpatient Medications   Medication Sig Dispense Refill    amphetamine-dextroamphetamine (ADDERALL XR) 20 MG 24 hr capsule Take 1 capsule by mouth every morning      amphetamine-dextroamphetamine (ADDERALL) 5 MG tablet Take 1 tablet by mouth daily Every early afternoon      aspirin (ASA) 81 MG EC tablet Take 1 tablet (81 mg) by mouth daily      cilostazol (PLETAL) 100 MG tablet Take 1 tablet (100 mg) by mouth 2 times daily 180 tablet 4    hydrochlorothiazide (HYDRODIURIL) 25 MG tablet Take 1 tablet (25 mg) by mouth daily 90 tablet 4    nicotine (NICODERM CQ) 14 MG/24HR 24 hr patch Place 1 patch onto the skin every 24 hours for 14 days 14  "patch 0    nicotine (NICODERM CQ) 21 MG/24HR 24 hr patch Place 1 patch onto the skin every 24 hours for 14 days 14 patch 0    nicotine (NICODERM CQ) 7 MG/24HR 24 hr patch Place 1 patch onto the skin every 24 hours for 56 days 56 patch 0    rosuvastatin (CRESTOR) 20 MG tablet Take 1 tablet (20 mg) by mouth at bedtime 90 tablet 4    vitamin D3 (D 5000) 125 MCG (5000 UT) tablet Take 1 tablet (125 mcg) by mouth every 72 hours       No current facility-administered medications for this visit.       Pertinent PMHx of ADHD, MERCEDES, tobacco use.    Prior Sleep Testin2015 - PSG with weight 200 lbs.  SpO2 mean 89%, jesus 84%.  <= 88% for 122.2 minutes.  AHI 5.3.  No PLM's.    STOP-BANG score of 5, with unknown neck circumference.  Irene score of 3.  JC: 6    BMI of Estimated body mass index is 29.34 kg/m  as calculated from the following:    Height as of 24: 1.676 m (5' 6\").    Weight as of 24: 82.5 kg (181 lb 12.8 oz).     Chief concern per questionnaire: \"My cpap machine pushes too much air and after 4 hours my stomach is hard and causes burping \"    Duration of symptoms:  \"2 years\"    Goals for visit per questionnaire: \"I want my machine adjusted\"    Sleep pattern:  Workdays.  6pm (?) - 6am.  Weekends.  9-10pm to 6am.  Time to fall asleep: ~< 30 minutes.  Awakenings: 2-3 times per night, varies minutes to return to sleep.  Average total sleep time:  6-7 hours  Napping.  0 days per week, - hours per nap.    No for RLS screen.  No for sleep walking.  No for dream enactment behavior.  Yes for bruxism.    Yes for morning headaches.  Yes for snoring.  Unknown for observed apnea.  No for FHx of MERCEDES.    Caffeine use:  Yes for 3+ per day.  Yes for within 6 hours of bed.    Tobacco use: Yes    Prescribed Adderall XR 20mg QAM and IR 5mg BID PRN for ADHD.    A/P:    1.)  History of very mild MERCEDES with significant sleep-associated hypoxemia and borderline sustained hypoxemia  - Interim weight loss of ~20 " lbs.    Currently using PAP therapy, unclear settings.  Recommend clinic visit and up to date PAP download.  Likely can reduce pressure settings given weight loss and recommend home overnight pulse oximetry on new settings to evaluate for residual hypoxemia.    ---  This note was written with the assistance of the Dragon voice-dictation technology software. The final document, although reviewed, may contain errors. For corrections, please contact the office.    Booker Merritt MD    Sleep Medicine  [place], [place]  Main Office: [phone]  [facility] [place]  [address] [place] [address], [address]  Schedule visits: [phone]  Main Office: [phone]  Fax: [phone]

## 2024-04-24 ENCOUNTER — DOCUMENTATION ONLY (OUTPATIENT)
Dept: SLEEP MEDICINE | Facility: HOSPITAL | Age: 54
End: 2024-04-24
Attending: FAMILY MEDICINE
Payer: COMMERCIAL

## 2024-04-25 ENCOUNTER — DOCUMENTATION ONLY (OUTPATIENT)
Dept: SLEEP MEDICINE | Facility: HOSPITAL | Age: 54
End: 2024-04-25
Attending: FAMILY MEDICINE
Payer: COMMERCIAL

## 2024-05-02 NOTE — PROCEDURES
Documenting results of home overnight pulse oximetry performed night of 4/24/2024 on CPAP with unknown settings.    Total recording time 5:12:00 with valid time of 5:12:00.  Lowest pulse rate 38 (appears to be artifact) with average pulse rate of 73.2.  Lowest SpO2 84%, mean / baseline SpO2 92.4%.  SpO2 < 88% for 0.5 minutes.  Oxygen desaturation index (4% or more desaturation, lasting 10+ seconds) of 0.6 / hour.    A/P:  1.) Normal and reassuring oxygen recording with use of CPAP on current settings.    Booker Merritt MD

## 2024-05-09 ENCOUNTER — HOSPITAL ENCOUNTER (OUTPATIENT)
Dept: CT IMAGING | Facility: OTHER | Age: 54
Discharge: HOME | End: 2024-05-09
Attending: NURSE PRACTITIONER
Payer: COMMERCIAL

## 2024-05-09 ENCOUNTER — HOSPITAL ENCOUNTER (OUTPATIENT)
Dept: MAMMOGRAPHY | Facility: OTHER | Age: 54
Discharge: HOME | End: 2024-05-09
Attending: NURSE PRACTITIONER
Payer: COMMERCIAL

## 2024-05-09 DIAGNOSIS — Z12.31 ENCOUNTER FOR SCREENING MAMMOGRAM FOR BREAST CANCER: ICD-10-CM

## 2024-05-09 DIAGNOSIS — Z12.2 SCREENING FOR LUNG CANCER: ICD-10-CM

## 2024-05-09 PROCEDURE — 71271 CT THORAX LUNG CANCER SCR C-: CPT

## 2024-05-09 PROCEDURE — 77063 BREAST TOMOSYNTHESIS BI: CPT

## 2024-05-09 NOTE — PROGRESS NOTES
Lung Cancer Screening Shared Decision Making Visit     Rose Mendez, a 53 year old female, is eligible for lung cancer screening    History   Smoking Status    Every Day    Packs/day: 1.00    Years: 30.00    Types: Cigarettes   Smokeless Tobacco    Never       I have discussed with patient the risks and benefits of screening for lung cancer with low-dose CT.     The risks include:    radiation exposure: one low dose chest CT has as much ionizing radiation as about 15 chest x-rays, or 6 months of background radiation living in [place]      false positives: most findings/nodules are NOT cancer, but some might still require additional diagnostic evaluation, including biopsy    over-diagnosis: some slow growing cancers that might never have been clinically significant will be detected and treated unnecessarily     The benefit of early detection of lung cancer is contingent upon adherence to annual screening or more frequent follow up if indicated.     Furthermore, to benefit from screening, Rose must be willing and able to undergo diagnostic procedures, if indicated. Although no specific guide is available for determining severity of comorbidities, it is reasonable to withhold screening in patients who have greater mortality risk from other diseases.     We did discuss that the best way to prevent lung cancer is to not smoke.    Some patients may value a numeric estimation of lung cancer risk when evaluating if lung cancer screening is right for them, here is one calculator:    ShouldIScreen

## 2024-05-21 ENCOUNTER — VIRTUAL VISIT (OUTPATIENT)
Dept: PULMONOLOGY | Facility: OTHER | Age: 54
End: 2024-05-21
Attending: FAMILY MEDICINE
Payer: COMMERCIAL

## 2024-05-21 VITALS — BODY MASS INDEX: 28.93 KG/M2 | HEIGHT: 66 IN | WEIGHT: 180 LBS

## 2024-05-21 DIAGNOSIS — G47.33 OSA (OBSTRUCTIVE SLEEP APNEA): Primary | ICD-10-CM

## 2024-05-21 DIAGNOSIS — Z78.9 INTOLERANCE OF CONTINUOUS POSITIVE AIRWAY PRESSURE (CPAP) VENTILATION: ICD-10-CM

## 2024-05-21 PROCEDURE — 99203 OFFICE O/P NEW LOW 30 MIN: CPT | Mod: 95 | Performed by: FAMILY MEDICINE

## 2024-05-21 ASSESSMENT — PAIN SCALES - GENERAL: PAINLEVEL: NO PAIN (0)

## 2024-05-21 NOTE — PROGRESS NOTES
"Rose Mendez is a 53 year old female who is being evaluated via a billable video visit.       The patient has been notified of following:      \"This video visit will be conducted via a call between you and your physician/provider. We have found that certain health care needs can be provided without the need for an in-person physical exam.  This service lets us provide the care you need with a video conversation.  If a prescription is necessary we can send it directly to your pharmacy.  If lab work is needed we can place an order for that and you can then stop by our lab to have the test done at a later time.     Video visits are billed at different rates depending on your insurance coverage.  Please reach out to your insurance provider with any questions.     If during the course of the call the physician/provider feels a video visit is not appropriate, you will not be charged for this service.\"     Patient has given verbal consent for Video visit? Yes  How would you like to obtain your AVS? Mail a copy  If you are dropped from the video visit, the video invite should be resent to: Text to cell phone: -  Will anyone else be joining your video visit? No  If patient encounters technical issues they should call [phone]      Video-Visit Details     Type of service:  Video Visit     Start Time:  3:30pm  End Time:  3:50pm    Originating Location (pt. Location): Home     Distant Location (provider location):  Off-site, [facility]       Platform used for Video Visit: Operation [employer] Drop    Virtual visit for establishing care for MERCEDES and evaluating PAP settings.     A/P:     1.)  History of very mild MERCEDES with significant sleep-associated hypoxemia and borderline sustained hypoxemia  - Interim weight loss of ~20 lbs.  - KOBI on CPAP performed 4/24/2024 was WNL.     Currently using PAP therapy, unclear settings. Her recent overnight pulse oximetry using the CPAP was without significant hypoxemia. Discussed " "options to 1) reduce pressure settings given weight loss in the setting of very mild MERCEDES versus 2) repeat sleep study to assess for resolution of sleep apnea. Rose elected to repeat the sleep study with an at-home WatchPAT study. Recommended not using CPAP until sleep study.      SUBJECTIVE:  Rose Mendez is a 53 year old female.    53 year old female who is referred for establishing care for MERCEDES and concern for excessive PAP settings.     Pertinent PMHx of ADHD, MERCEDES, tobacco use.     Prior Sleep Testin2015 - PSG with weight 200 lbs.  SpO2 mean 89%, jesus 84%.  <= 88% for 122.2 minutes.  AHI 5.3.  No PLM's.  2024 - KOBI on CPAP (unknown settings).  Analyzed 5 hours 12 minute.  Basal SpO2 92.4%, jesus 84%, time < 88% for 0.5 minutes.  PEDRO 0.6.     STOP-BANG score of 5, with unknown neck circumference.  [place] score of 3.  JC: 6     BMI of Estimated body mass index is 29.34 kg/m  as calculated from the following:    Height as of 24: 1.676 m (5' 6\").    Weight as of 24: 82.5 kg (181 lb 12.8 oz).      Today - virtual visit to establish care for MERCEDES.    Rose reports that she has had issues with her CPAP settings for a long time. She feels the pressure is too high such that it fills her stomach with air. She cannot tolerate the mask for longer than a 4 hour stretch, so she has not been using it regularly. Her current partner has noticed that without the CPAP she snores all the time and he often has witnessed her gasping for breath. Without the CPAP machine Rose sleeps 4-5 hours per night and feels her sleep is not restful. She often wakes up with a headache that she attributes to grinding her teeth at night and TMJ pain. She reports that for the most recent pulse oximetry testing, she wore her CPAP for approximately 5 hours. She has had a 20 pound weight loss since her initial sleep study in  when she was diagnosed with mild MERCEDES.     Chief concern per questionnaire: \"My cpap machine pushes " "too much air and after 4 hours my stomach is hard and causes burping \"     Duration of symptoms:  \"2 years\"     Goals for visit per questionnaire: \"I want my machine adjusted\"     Sleep pattern:  Workdays.  6pm (?) - 6am.  Weekends.  9-10pm to 6am.  Time to fall asleep: ~< 30 minutes.  Awakenings: 2-3 times per night, varies minutes to return to sleep.  Average total sleep time:  6-7 hours  Napping.  0 days per week, - hours per nap.     No for RLS screen.  No for sleep walking.  No for dream enactment behavior.  Yes for bruxism.     Yes for morning headaches.  Yes for snoring.  Unknown for observed apnea.  No for FHx of MERCEDES.     Caffeine use:  Yes for 3+ per day.  Yes for within 6 hours of bed.     Tobacco use: Yes     Prescribed Adderall XR 20mg QAM and IR 5mg BID PRN for ADHD.      Past medical history:    Patient Active Problem List    Diagnosis Date Noted    ADHD (attention deficit hyperactivity disorder), combined type 11/25/2018     Priority: Medium     Diagnostic assessment completed by Lena Ram LP at [facility] psychological services 10/2018--see scans      Adjustment disorder with mixed anxiety and depressed mood 02/16/2018     Priority: Medium     Diagnostic assessment final impression--[facility] psychological [facility] Lena Licona LP--10/2018      Tobacco abuse 02/16/2018     Priority: Medium    Tobacco dependence 02/10/2016     Priority: Medium    Status post finger joint fusion 12/15/2015     Priority: Medium    MERCEDES on CPAP 07/10/2015     Priority: Medium     Overview:   Sleep studies and followup per Dr Alcantara 7/2015      History of hysterectomy 02/25/2015     Priority: Medium    Vitamin D deficiency 02/13/2015     Priority: Medium    Snoring 02/12/2015     Priority: Medium    Multiple thyroid nodules 01/19/2015     Priority: Medium    Ganglion cyst of wrist 09/03/2014     Priority: Medium    CMC arthritis, thumb, degenerative 08/27/2014     Priority: Medium       10 " point ROS of systems including Constitutional, Eyes, Respiratory, Cardiovascular, Gastroenterology, Genitourinary, Integumentary, Muscularskeletal, Psychiatric were all negative except for pertinent positives noted in my HPI.    Current Outpatient Medications   Medication Sig Dispense Refill    amphetamine-dextroamphetamine (ADDERALL XR) 20 MG 24 hr capsule Take 1 capsule by mouth every morning      amphetamine-dextroamphetamine (ADDERALL) 5 MG tablet Take 1 tablet by mouth daily Every early afternoon      aspirin (ASA) 81 MG EC tablet Take 1 tablet (81 mg) by mouth daily      cilostazol (PLETAL) 100 MG tablet Take 1 tablet (100 mg) by mouth 2 times daily 180 tablet 4    hydrochlorothiazide (HYDRODIURIL) 25 MG tablet Take 1 tablet (25 mg) by mouth daily 90 tablet 4    nicotine (NICODERM CQ) 7 MG/24HR 24 hr patch Place 1 patch onto the skin every 24 hours for 56 days 56 patch 0    rosuvastatin (CRESTOR) 20 MG tablet Take 1 tablet (20 mg) by mouth at bedtime 90 tablet 4    vitamin D3 (D 5000) 125 MCG (5000 UT) tablet Take 1 tablet (125 mcg) by mouth every 72 hours         OBJECTIVE:  LMP 07/01/2010 (Approximate)     Physical Exam     General: Sitting upright, no acute distress  HEENT: EOMs grossly intact  Respiratory: Breathing comfortably on room air without audible wheezes  Neuro: Alert and oriented. Speech fluent without dysarthria or aphasia. Face symmetric.  Psych: euthymic    Patient seen and discussed with Dr. Merritt.    Elle Pineda MS4  [employer]  [place] [employer]  ---  This note was written with the assistance of the Dragon voice-dictation technology software. The final document, although reviewed, may contain errors. For corrections, please contact the office.    Scribe Disclosure:   Elle SCRUGGS, MS4, am serving as a scribe; to document services personally performed by Dr. Booker Merritt, based on data collection and the provider's statements to me.     Provider Disclosure:  Booker SCRUGGS  Shaina, agree with above History, Review of Systems, Physical exam and Plan.  I have reviewed the content of the documentation and have edited it as needed. I have personally performed the services documented here and the documentation accurately represents those services and the decisions I have made.      I have spent 25 minutes with this patient today in which greater than 50% of this time was spent in the counseling / coordination of care.      Booker Merritt MD

## 2024-05-21 NOTE — PROGRESS NOTES
"Virtual Visit Details    Type of service:  Video Visit     Originating Location (pt. Location): {video visit patient location:370406::\"Home\"}  {PROVIDER LOCATION On-site should be selected for visits conducted from your clinic location or adjoining [facility] hospital, academic office, or other nearby [facility] building. Off-site should be selected for all other provider locations, including home:[id number]}  Distant Location (provider location):  {virtual location provider:384120}  Platform used for Video Visit: {Virtual Visit Platforms:667181::\"Document Security Systems\"}    "

## 2024-05-21 NOTE — NURSING NOTE
Has patient had flu shot for current/most recent flu season? If so, when? No    Is the patient currently in the state of [place]? YES    Visit mode:VIDEO    If the visit is dropped, the patient can be reconnected by: VIDEO VISIT: Text to cell phone:   Telephone Information:   Mobile [phone]       Will anyone else be joining the visit? NO  (If patient encounters technical issues they should call [phone]    How would you like to obtain your AVS? MyChart    Are changes needed to the allergy or medication list? No    Are refills needed on medications prescribed by this physician? NO    Reason for visit: RECHECK    No other vitals to report per pt    Chantelle GREENF

## 2024-06-12 ENCOUNTER — VIRTUAL VISIT (OUTPATIENT)
Dept: SLEEP MEDICINE | Facility: HOSPITAL | Age: 54
End: 2024-06-12
Attending: FAMILY MEDICINE
Payer: COMMERCIAL

## 2024-06-12 DIAGNOSIS — G47.33 OSA (OBSTRUCTIVE SLEEP APNEA): Primary | ICD-10-CM

## 2024-06-13 NOTE — PROGRESS NOTES
Patient was provided both verbal and written education and instructions on use of Watch PAT device. Watch PAT device has been registered and shipped via [employer] on 6/13/2024. Patient was notified that package was mailed out. Watch PAT serial number: [id number]    Tracking # [id number].

## 2024-06-19 ENCOUNTER — DOCUMENTATION ONLY (OUTPATIENT)
Dept: SLEEP MEDICINE | Facility: HOSPITAL | Age: 54
End: 2024-06-19
Attending: FAMILY MEDICINE
Payer: COMMERCIAL

## 2024-06-19 DIAGNOSIS — G47.33 OSA (OBSTRUCTIVE SLEEP APNEA): ICD-10-CM

## 2024-06-19 DIAGNOSIS — Z78.9 INTOLERANCE OF CONTINUOUS POSITIVE AIRWAY PRESSURE (CPAP) VENTILATION: ICD-10-CM

## 2024-06-19 PROCEDURE — 95800 SLP STDY UNATTENDED: CPT | Mod: 26 | Performed by: FAMILY MEDICINE

## 2024-06-19 PROCEDURE — 95800 SLP STDY UNATTENDED: CPT

## 2024-06-19 NOTE — PROGRESS NOTES
WatchPAT data has been received and has been scored using rule 1B, 4%. Patient to follow up with provider to determine appropriate therapy.    Pat AHI: 9.9    Ordering Provider: Dr Booker Merritt      Sleep Technician/Technologist: Agatha PADILLA

## 2024-06-26 NOTE — PROCEDURES
"WatchPAT - HOME SLEEP STUDY INTERPRETATION    Patient: Rose Mendez  MRN: [id number]  YOB: 1970  Study Date: 2024  Referring Provider: Nena Christian  Ordering Provider: Booker Merritt MD, MD    Chain of custody patient verification was not enabled.       Indications for Home Study: Rose Mendez is a 54 year old female with a history of MERCEDES, ADHD who presents with history of MERCEDES and need for repeat testing to assess current severity.    Prior Sleep Testin2015 - PSG with weight 200 lbs.  SpO2 mean 89%, jesus 84%.  <= 88% for 122.2 minutes.  AHI 5.3.  No PLM's.  2024 - KOBI on CPAP (unknown settings).  Analyzed 5 hours 12 minute.  Basal SpO2 92.4%, jesus 84%, time < 88% for 0.5 minutes.  PEDRO 0.6.    Estimated body mass index is 29.05 kg/m  as calculated from the following:    Height as of 24: 1.676 m (5' 6\").    Weight as of 24: 81.6 kg (180 lb).  Total score - [place]: 3 (4/10/2024  4:40 PM)  Total Score: 4 (4/10/2024  4:39 PM)    Data: A full night home sleep study was performed recording the standard physiologic parameters including peripheral arterial tonometry (PAT), sound/snoring, body position,  movement, sound, and oxygen saturation by pulse oximetry. Pulse rate was estimated by oximetry recording. Sleep staging (wake, REM, light, and deep sleep) was derived from PAT signal.  This study was considered adequate based on > 4 hours of quality oximetry and respiratory recording. As specified by the AASM Manual for the Scoring of Sleep and Associated events, version 2.3, Rule VIII.D 1B, 4% oxygen desaturation scoring for hypopneas is used as a standard of care on all home sleep apnea testing.    Total Recording Time: 6 hrs, 41 min  Total Sleep Time: 6 hrs, 8 min  % of Sleep Time REM: 27.6%    Respiratory:  Snoring: Snoring was present.  Respiratory events: The PAT respiratory disturbance index [pRDI] was 13.4 events per hour.  The PAT " apnea/hypopnea index [pAHI] was 9.9 events per hour.  PEDRO was 9.2 events per hour.  During REM sleep the pAHI was 15.6.  Sleep Associated Hypoxemia: sustained hypoxemia was present. Mean oxygen saturation was 88%.  Minimum was 80%.  Time with saturation less than 88% was 201.6 minutes.    Heart Rate: By pulse oximetry normal rate was noted.     Position: Percent of time spent: supine - 19.3%, prone - 24.7%, on right - 37.2%, on left - 18.7%.  pAHI was 21 per hour supine, 6.6 per hour prone, 8.8 per hour on right side, and 4.5 per hour on left side.     Assessment:   Mild obstructive sleep apnea.  Sleep associated hypoxemia was present.    Recommendations:  Consider auto-CPAP at 5-10 cmH2O or polysomnography with full night PAP titration.  Suggest optimizing sleep hygiene and avoiding sleep deprivation.  Weight management.    Diagnosis Code(s): Obstructive Sleep Apnea G47.33, Hypoxemia G47.36    Booker Merritt MD, MD, June 26, 2024   Diplomate, American Board of Family Medicine, Sleep Medicine

## 2024-07-16 ENCOUNTER — OFFICE VISIT (OUTPATIENT)
Dept: OTOLARYNGOLOGY | Facility: OTHER | Age: 54
End: 2024-07-16
Attending: OTOLARYNGOLOGY
Payer: COMMERCIAL

## 2024-07-16 DIAGNOSIS — M26.609 TMJ (TEMPOROMANDIBULAR JOINT SYNDROME): Primary | ICD-10-CM

## 2024-07-16 PROCEDURE — G0463 HOSPITAL OUTPT CLINIC VISIT: HCPCS

## 2024-07-16 NOTE — NURSING NOTE
Patient here to see ENT for throat & ear concerns.   Nohemi Shipman LPN ..........7/16/2024 10:42 AM

## 2024-07-21 ENCOUNTER — MYC REFILL (OUTPATIENT)
Dept: FAMILY MEDICINE | Facility: OTHER | Age: 54
End: 2024-07-21
Payer: COMMERCIAL

## 2024-07-21 DIAGNOSIS — I70.213 INTERMITTENT CLAUDICATION OF BOTH LOWER EXTREMITIES DUE TO ATHEROSCLEROSIS (H): ICD-10-CM

## 2024-07-21 DIAGNOSIS — E78.5 DYSLIPIDEMIA: ICD-10-CM

## 2024-07-21 DIAGNOSIS — I25.10 ASCVD (ARTERIOSCLEROTIC CARDIOVASCULAR DISEASE): ICD-10-CM

## 2024-07-23 RX ORDER — ROSUVASTATIN CALCIUM 20 MG/1
20 TABLET, COATED ORAL AT BEDTIME
Qty: 90 TABLET | Refills: 4 | OUTPATIENT
Start: 2024-07-23

## 2024-07-23 NOTE — TELEPHONE ENCOUNTER
Redundant refill request refused: Too soon:  rosuvastatin (CRESTOR) 20 MG tablet 90 tablet 4 4/9/2024 -- No   Sig - Route: Take 1 tablet (20 mg) by mouth at bedtime - Oral   Sent to pharmacy as: Rosuvastatin Calcium 20 MG Oral Tablet (CRESTOR)   Class: E-Prescribe   Order: [id number]   E-Prescribing Status: Receipt confirmed by pharmacy (4/9/2024  8:35 AM CDT)     Printout Tracking    External Result Report     Pharmacy    [id number] - [place], [place] - [address]   Adeola Perez RN .............. 7/23/2024  10:43 AM

## 2024-07-26 ENCOUNTER — VIRTUAL VISIT (OUTPATIENT)
Dept: PULMONOLOGY | Facility: OTHER | Age: 54
End: 2024-07-26
Attending: FAMILY MEDICINE
Payer: COMMERCIAL

## 2024-07-26 VITALS — WEIGHT: 180 LBS | HEIGHT: 66 IN | BODY MASS INDEX: 28.93 KG/M2

## 2024-07-26 DIAGNOSIS — G47.33 OSA (OBSTRUCTIVE SLEEP APNEA): Primary | ICD-10-CM

## 2024-07-26 PROCEDURE — 99213 OFFICE O/P EST LOW 20 MIN: CPT | Mod: 95 | Performed by: FAMILY MEDICINE

## 2024-07-26 PROCEDURE — G2211 COMPLEX E/M VISIT ADD ON: HCPCS | Mod: 95 | Performed by: FAMILY MEDICINE

## 2024-07-26 ASSESSMENT — PAIN SCALES - GENERAL: PAINLEVEL: NO PAIN (0)

## 2024-07-26 NOTE — PROGRESS NOTES
"Virtual Visit Details    Type of service:  Video Visit     Originating Location (pt. Location): Home    Distant Location (provider location):  Off-site  Platform used for Video Visit: Leona Rose AUSTIN Mendez is a 54 year old female who is being evaluated via a billable video visit.       The patient has been notified of following:      \"This video visit will be conducted via a call between you and your physician/provider. We have found that certain health care needs can be provided without the need for an in-person physical exam.  This service lets us provide the care you need with a video conversation.  If a prescription is necessary we can send it directly to your pharmacy.  If lab work is needed we can place an order for that and you can then stop by our lab to have the test done at a later time.     Video visits are billed at different rates depending on your insurance coverage.  Please reach out to your insurance provider with any questions.     If during the course of the call the physician/provider feels a video visit is not appropriate, you will not be charged for this service.\"     Patient has given verbal consent for Video visit? Yes  How would you like to obtain your AVS? Mail a copy  If you are dropped from the video visit, the video invite should be resent to: Text to cell phone: -  Will anyone else be joining your video visit? No  If patient encounters technical issues they should call [phone]      Video-Visit Details     Type of service:  Video Visit     Start Time:  2pm  End Time:  2:10pm    Originating Location (pt. Location): Home     Distant Location (provider location):  Off-site, [facility] [place]       Platform used for Video Visit: Leona    Virtual visit for review of home sleep testing results.     A/P:     1.)  Mild MERCEDES (pAHI 9.9) with sustained hypoxemia and significant sleep-associated hypoxemia (SpO2 <= 88% for 201.6 minutes).  - Prior PSG 5/18/2015 with very " mild MERCEDES with significant sleep-associated hypoxemia and borderline sustained hypoxemia  - KOBI on CPAP performed 2024 was WNL.      Given the consistent evidence of significant hypoxemia with mild MERCEDES without use of CPAP and normal KOBI on CPAP, I would recommend continuation of treatment.      Her current CPAP settings which feel excessive are auto titrate 5-16.  Given her interim weight loss, I have changed her settings empirically to 5-8 cm H2O.  She will give an update in a few weeks and we are able to review her data remotely.    The longitudinal plan of care for the diagnosis(es)/condition(s) as documented were addressed during this visit. Due to the added complexity in care, I will continue to support Rose in the subsequent management and with ongoing continuity of care.      SUBJECTIVE:  Rose Mendez is a 54 year old female.    Pertinent PMHx of ADHD, MERCEDES, tobacco use.     Prior Sleep Testin2015 - PSG with weight 200 lbs.  SpO2 mean 89%, jesus 84%.  <= 88% for 122.2 minutes.  AHI 5.3.  No PLM's.  2024 - KOBI on CPAP (unknown settings).  Analyzed 5 hours 12 minute.  Basal SpO2 92.4%, jesus 84%, time < 88% for 0.5 minutes.  PEDRO 0.6.     STOP-BANG score of 5, with unknown neck circumference.  [place] score of 3.  JC: 6    2024 - virtual visit to establish care for MERCEDES.     Rose reports that she has had issues with her CPAP settings for a long time. She feels the pressure is too high such that it fills her stomach with air. She cannot tolerate the mask for longer than a 4 hour stretch, so she has not been using it regularly. Her current partner has noticed that without the CPAP she snores all the time and he often has witnessed her gasping for breath. Without the CPAP machine Rose sleeps 4-5 hours per night and feels her sleep is not restful. She often wakes up with a headache that she attributes to grinding her teeth at night and TMJ pain. She reports that for the most recent pulse  "oximetry testing, she wore her CPAP for approximately 5 hours. She has had a 20 pound weight loss since her initial sleep study in  when she was diagnosed with mild MERCEDES.      Chief concern per questionnaire: \"My cpap machine pushes too much air and after 4 hours my stomach is hard and causes burping \"     Duration of symptoms:  \"2 years\"     Goals for visit per questionnaire: \"I want my machine adjusted\"     Sleep pattern:  Workdays.  6pm (?) - 6am.  Weekends.  9-10pm to 6am.  Time to fall asleep: ~< 30 minutes.  Awakenings: 2-3 times per night, varies minutes to return to sleep.  Average total sleep time:  6-7 hours  Napping.  0 days per week, - hours per nap.     No for RLS screen.  No for sleep walking.  No for dream enactment behavior.  Yes for bruxism.     Yes for morning headaches.  Yes for snoring.  Unknown for observed apnea.  No for FHx of MERCEDES.     Caffeine use:  Yes for 3+ per day.  Yes for within 6 hours of bed.     Tobacco use: Yes     Prescribed Adderall XR 20mg QAM and IR 5mg BID PRN for ADHD.    A/P for repeat WatchPAT HST to assess for current MERCEDES severity with potential resolution following weight loss.    Today -we reviewed her home sleep test results in detail.    WatchPAT - HOME SLEEP STUDY INTERPRETATION     Patient: Rose Mendez  MRN: [id number]  YOB: 1970  Study Date: 2024  Referring Provider: Nena Christian  Ordering Provider: Booker Merritt MD, MD     Chain of custody patient verification was not enabled.       Indications for Home Study: Rose Mendez is a 54 year old female with a history of MERCEDES, ADHD who presents with history of MERCEDES and need for repeat testing to assess current severity.     Prior Sleep Testin2015 - PSG with weight 200 lbs.  SpO2 mean 89%, jesus 84%.  <= 88% for 122.2 minutes.  AHI 5.3.  No PLM's.  2024 - KOBI on CPAP (unknown settings).  Analyzed 5 hours 12 minute.  Basal SpO2 92.4%, jesus 84%, time < 88% " "for 0.5 minutes.  PEDRO 0.6.     Estimated body mass index is 29.05 kg/m  as calculated from the following:    Height as of 5/21/24: 1.676 m (5' 6\").    Weight as of 5/21/24: 81.6 kg (180 lb).  Total score - [place]: 3 (4/10/2024  4:40 PM)  Total Score: 4 (4/10/2024  4:39 PM)     Data: A full night home sleep study was performed recording the standard physiologic parameters including peripheral arterial tonometry (PAT), sound/snoring, body position,  movement, sound, and oxygen saturation by pulse oximetry. Pulse rate was estimated by oximetry recording. Sleep staging (wake, REM, light, and deep sleep) was derived from PAT signal.  This study was considered adequate based on > 4 hours of quality oximetry and respiratory recording. As specified by the AASM Manual for the Scoring of Sleep and Associated events, version 2.3, Rule VIII.D 1B, 4% oxygen desaturation scoring for hypopneas is used as a standard of care on all home sleep apnea testing.     Total Recording Time: 6 hrs, 41 min  Total Sleep Time: 6 hrs, 8 min  % of Sleep Time REM: 27.6%     Respiratory:  Snoring: Snoring was present.  Respiratory events: The PAT respiratory disturbance index [pRDI] was 13.4 events per hour.  The PAT apnea/hypopnea index [pAHI] was 9.9 events per hour.  PEDRO was 9.2 events per hour.  During REM sleep the pAHI was 15.6.  Sleep Associated Hypoxemia: sustained hypoxemia was present. Mean oxygen saturation was 88%.  Minimum was 80%.  Time with saturation less than 88% was 201.6 minutes.     Heart Rate: By pulse oximetry normal rate was noted.      Position: Percent of time spent: supine - 19.3%, prone - 24.7%, on right - 37.2%, on left - 18.7%.  pAHI was 21 per hour supine, 6.6 per hour prone, 8.8 per hour on right side, and 4.5 per hour on left side.      Assessment:   Mild obstructive sleep apnea.  Sleep associated hypoxemia was present.     Recommendations:  Consider auto-CPAP at 5-10 cmH2O or polysomnography with full night PAP " titration.  Suggest optimizing sleep hygiene and avoiding sleep deprivation.  Weight management.     Diagnosis Code(s): Obstructive Sleep Apnea G47.33, Hypoxemia G47.36     Booker Merritt MD, MD, June 26, 2024   Diplomate, American Board of Family Medicine, Sleep Medicine    Past medical history:    Patient Active Problem List    Diagnosis Date Noted    ADHD (attention deficit hyperactivity disorder), combined type 11/25/2018     Priority: Medium     Diagnostic assessment completed by Lena Ram [facility] at [facility] 10/2018--see scans      Adjustment disorder with mixed anxiety and depressed mood 02/16/2018     Priority: Medium     Diagnostic assessment final impression--[facility] Lena Licona LP--10/2018      Tobacco abuse 02/16/2018     Priority: Medium    Tobacco dependence 02/10/2016     Priority: Medium    Status post finger joint fusion 12/15/2015     Priority: Medium    MERCEDES on CPAP 07/10/2015     Priority: Medium     Overview:   Sleep studies and followup per Dr Alcantara 7/2015      History of hysterectomy 02/25/2015     Priority: Medium    Vitamin D deficiency 02/13/2015     Priority: Medium    Snoring 02/12/2015     Priority: Medium    Multiple thyroid nodules 01/19/2015     Priority: Medium    Ganglion cyst of wrist 09/03/2014     Priority: Medium    CMC arthritis, thumb, degenerative 08/27/2014     Priority: Medium       10 point ROS of systems including Constitutional, Eyes, Respiratory, Cardiovascular, Gastroenterology, Genitourinary, Integumentary, Muscularskeletal, Psychiatric were all negative except for pertinent positives noted in my HPI.    Current Outpatient Medications   Medication Sig Dispense Refill    amphetamine-dextroamphetamine (ADDERALL XR) 20 MG 24 hr capsule Take 1 capsule by mouth every morning      amphetamine-dextroamphetamine (ADDERALL) 5 MG tablet Take 1 tablet by mouth daily Every early afternoon       aspirin (ASA) 81 MG EC tablet Take 1 tablet (81 mg) by mouth daily      cilostazol (PLETAL) 100 MG tablet Take 1 tablet (100 mg) by mouth 2 times daily 180 tablet 4    hydrochlorothiazide (HYDRODIURIL) 25 MG tablet Take 1 tablet (25 mg) by mouth daily 90 tablet 4    rosuvastatin (CRESTOR) 20 MG tablet Take 1 tablet (20 mg) by mouth at bedtime 90 tablet 4    vitamin D3 (D 5000) 125 MCG (5000 UT) tablet Take 1 tablet (125 mcg) by mouth every 72 hours         OBJECTIVE:  LMP 07/01/2010 (Approximate)     Physical Exam     ---  This note was written with the assistance of the Dragon voice-dictation technology software. The final document, although reviewed, may contain errors. For corrections, please contact the office.    Total time spent preparing to see the patient, review of chart, obtaining history and physical examination, review of sleep testing, review of treatment options, education, discussion with patient and documenting in Epic / EMR was 15 minutes.  All time involved was spent on the day of service for the patient (the same day as the patient's appointment).    Booker Merritt MD    Sleep Medicine  [place], [place]  Main Office: [phone]  [facility] [address], [address]  Schedule visits: [phone]  Main Office: [phone]  Fax: [phone]

## 2024-07-26 NOTE — NURSING NOTE
Current patient location:  [address]    Is the patient currently in the state of [place]? YES    Visit mode:VIDEO    If the visit is dropped, the patient can be reconnected by: VIDEO VISIT: Text to cell phone:   Telephone Information:   Mobile [phone]       Will anyone else be joining the visit? NO  (If patient encounters technical issues they should call [phone])    How would you like to obtain your AVS? MyChart    Are changes needed to the allergy or medication list? No    Are refills needed on medications prescribed by this physician? NO    Reason for visit: RECHECK    Marisol OSBORN

## 2024-07-26 NOTE — PROGRESS NOTES
document embedded image                                   [facility]                                                                                                                                         Patient Name: Rose Mendez   Address: [address] [place] [address]    YOB: 1970   [place] [id number]   MR Number: [id number]   Phone: [phone]  PCP: Nena Matias           Appointment Date: 07/16/24  Visit Provider: Checo Gross MD    cc: ~    ENT Progress Note        Intake  Visit Reasons: throat/ ear concerns    HPI  History of Present Illness  Chief complaint:  Throat and ear concerns    History  The patient is a 54-year-old female comes to the office today because of recurring ear discomfort.  She was diagnosed with TMJ.  She denies hearing loss or drainage.  In addition, she has had a chronic foreign body sensation almost like a popcorn whole stuck in her hypopharynx.  She states this has been present for years.  It was not been a progressive problem.    Exam  The external auditory canals and TMs are clear bilaterally   Oral cavity oropharynx-free of mucosal lesions or inflammation   Indirect laryngoscopy reveals Clear hypopharynx and larynx  Bimanual exam-no palpable tongue base or tonsillar fossa lesions   Neck-no palpable masses or adenopathy  Head and neck integument-Clear  General-the patient appears well and in no distress   Neuro-there are no focal cranial nerve deficits    PFSH  PFSH:     Past Medical History: (Updated 07/18/24 @ 09:10 by Tyra Gallardo, Med Assist)    Chronic headaches      Family History: (Updated 07/18/24 @ 09:11 by Tyra Gallardo, Med Assist)  Other  Chronic GERD  Hearing loss  Kidney disease        Social History: (Updated 07/18/24 @ 09:12 by Tyra Gallardo, Med Assist)  Smoking Status:  Current some day smoker tobacco type: cigarettes   Smoking cigarettes per day:  10   alcohol intake:  never   substance use type:   does not use     Vital Signs      07/18/24  09:10  Height   5 ft 6 in  Height (cm)   167.6  Weight   81.647 kg  Weight (lb)   180 lbs and  0.0  ozs   Weight Measurement Method   Stated by Patient  BMI   29.0  BSA   1.91    A&P  Assessment & Plan  (1) Temporomandibular joint disorder:         Status: Acute        Code(s):  M26.609 - Unspecified temporomandibular joint disorder, unspecified side  The patient's ear symptoms there is most certainly secondary to her TMJ.  This is a well-established diagnosis.  I can not find a etiology for foreign body sensation in her throat.  I am comfortable that she does not have evidence of neoplasm.  She is welcome to follow up if these symptoms are progressive.                Checo Gross MD    Filed: 07/18/24 0988      <Electronically signed by Checo Gross MD> 07/18/24 2214

## 2024-07-29 ENCOUNTER — MYC MEDICAL ADVICE (OUTPATIENT)
Dept: PULMONOLOGY | Facility: OTHER | Age: 54
End: 2024-07-29

## 2024-08-07 NOTE — TELEPHONE ENCOUNTER
Goal to change to auto 5-8 (currently 5-16).    [email or web address]-info   5

## 2024-10-10 ENCOUNTER — OFFICE VISIT (OUTPATIENT)
Dept: FAMILY MEDICINE | Facility: OTHER | Age: 54
End: 2024-10-10
Attending: STUDENT IN AN ORGANIZED HEALTH CARE EDUCATION/TRAINING PROGRAM
Payer: COMMERCIAL

## 2024-10-10 VITALS
HEIGHT: 66 IN | DIASTOLIC BLOOD PRESSURE: 76 MMHG | BODY MASS INDEX: 29.73 KG/M2 | SYSTOLIC BLOOD PRESSURE: 130 MMHG | OXYGEN SATURATION: 96 % | WEIGHT: 185 LBS | HEART RATE: 92 BPM | RESPIRATION RATE: 14 BRPM | TEMPERATURE: 97.6 F

## 2024-10-10 DIAGNOSIS — B02.9 HERPES ZOSTER WITHOUT COMPLICATION: Primary | ICD-10-CM

## 2024-10-10 PROCEDURE — 99213 OFFICE O/P EST LOW 20 MIN: CPT | Performed by: PHYSICIAN ASSISTANT

## 2024-10-10 RX ORDER — VALACYCLOVIR HYDROCHLORIDE 1 G/1
1000 TABLET, FILM COATED ORAL 3 TIMES DAILY
Qty: 21 TABLET | Refills: 0 | Status: SHIPPED | OUTPATIENT
Start: 2024-10-10 | End: 2024-10-17

## 2024-10-10 ASSESSMENT — PAIN SCALES - GENERAL: PAINLEVEL: MILD PAIN (3)

## 2024-10-10 NOTE — NURSING NOTE
"Chief Complaint   Patient presents with    Derm Problem     Hives / shingles? Chest, neck, face       Initial /76 (BP Location: Right arm, Patient Position: Sitting, Cuff Size: Adult Regular)   Pulse 92   Temp 97.6  F (36.4  C) (Temporal)   Resp 14   Ht 1.676 m (5' 6\")   Wt 83.9 kg (185 lb)   LMP 07/01/2010 (Approximate)   SpO2 96%   Breastfeeding No   BMI 29.86 kg/m   Estimated body mass index is 29.86 kg/m  as calculated from the following:    Height as of this encounter: 1.676 m (5' 6\").    Weight as of this encounter: 83.9 kg (185 lb).    Medication Review: complete    Liz Rodríguez LPN      "

## 2024-10-10 NOTE — PROGRESS NOTES
"ASSESSMENT/PLAN:     I have reviewed the nursing notes.  I have reviewed the findings, diagnosis, plan and need for follow up with the patient.    Rose was seen today for rash and is 1 week.  She is still breaking out with new vesicles on her face today. afebrile.  Vital signs stable. Consistent with shingles on her left face and left neck left chest.  It is over 72 hours but per up-to-date guidelines will treat with Valtrex based on new lesions occurring today. Valtrex 1 g 3 x daily x 7 days. Symptomatic treatments.  Return to clinic if symptoms persist or worsen.    Diagnoses and all orders for this visit:    Herpes zoster without complication  -     valACYclovir (VALTREX) 1000 mg tablet; Take 1 tablet (1,000 mg) by mouth 3 times daily for 7 days.      I explained my diagnostic considerations and recommendations to the patient, who voiced understanding and agreement with the treatment plan. All questions were answered. We discussed potential side effects of any prescribed or recommended therapies, as well as expectations for response to treatments.    Sarah Ospina PA-C  [facility]          Nursing Notes:   Liz Rodríguez LPN  10/10/2024  3:38 PM  Signed  Chief Complaint   Patient presents with    Derm Problem     Hives / shingles? Chest, neck, face       Initial /76 (BP Location: Right arm, Patient Position: Sitting, Cuff Size: Adult Regular)   Pulse 92   Temp 97.6  F (36.4  C) (Temporal)   Resp 14   Ht 1.676 m (5' 6\")   Wt 83.9 kg (185 lb)   LMP 07/01/2010 (Approximate)   SpO2 96%   Breastfeeding No   BMI 29.86 kg/m   Estimated body mass index is 29.86 kg/m  as calculated from the following:    Height as of this encounter: 1.676 m (5' 6\").    Weight as of this encounter: 83.9 kg (185 lb).    Medication Review: felipe Rodríguez LPN           SUBJECTIVE:   Rose Mendez is a 54 year old female who presents to clinic today for evaluation of rash  Onset: 1 week " ago  Course worsening  Associated symptoms painful erythematous lesions on her left face left neck left chest.  She is having new breakout of vesicle lesions on her face today.  Treatments: Calamine lotion    Denies: Fever        HPI          Past Medical History:   Diagnosis Date    ADHD (attention deficit hyperactivity disorder), combined type 2018    Diagnostic assessment completed by Lena Ram LP at [facility] services 10/2018--see scans    Adjustment disorder with mixed anxiety and depressed mood 2018    Overview:  with mood disorder    Adverse effect of anesthetic     2005,Notes prior complications from anesthesia:  (2005) Nausea    Dependence on other enabling machines and devices     7/10/2015,Sleep studies and followup per Dr Alcantara 2015    Disturbance of skin sensation     left side, resolved. Normal MRI    Ganglion of wrist     9/3/2014    Injury of ulnar nerve of forearm     (left Dr Riggs    Nontoxic multinodular goiter     2015    MERCEDES (obstructive sleep apnea)     Osteoarthritis of first carpometacarpal joint     2014    Other fracture of left lower leg, initial encounter for closed fracture     No Comments Provided    Personal history of other medical treatment (CODE)     20 c- sections    TMJ (temporomandibular joint syndrome)     Vitamin D deficiency     2015     Past Surgical History:   Procedure Laterality Date    AS REVISE ULNAR NERVE AT WRIST Left 2005,40298.0,DC REVISE ULNAR NERVE AT WRIST,left, [place]     SECTION           SECTION      ,with postpartum tubal sterilization Status post left elbow ulnar nerve transposition .    COLONOSCOPY N/A 3/1/2018    Procedure: COMBINED COLONOSCOPY, SINGLE OR MULTIPLE BIOPSY/POLYPECTOMY BY BIOPSY;  Colonoscopy;  Surgeon: Ramona Vegas MD;  Location: [facility] OR    HC ABLATION, ENDOMETRIAL, THERMAL, W/O HYSTEROSCOPIC GUIDANCE       "5/4/10,485129,ABLATION,endometrial    HYSTERECTOMY VAGINAL      7/6/10,Laparoscopic Assisted Vaginal Hysterectomy     Social History     Tobacco Use    Smoking status: Every Day     Current packs/day: 1.00     Average packs/day: 1 pack/day for 30.0 years (30.0 ttl pk-yrs)     Types: Cigarettes     Passive exposure: Current    Smokeless tobacco: Never   Substance Use Topics    Alcohol use: Yes     Alcohol/week: 0.0 standard drinks of alcohol     Comment: occ     Current Outpatient Medications   Medication Sig Dispense Refill    amphetamine-dextroamphetamine (ADDERALL XR) 20 MG 24 hr capsule Take 1 capsule by mouth every morning      amphetamine-dextroamphetamine (ADDERALL) 5 MG tablet Take 1 tablet by mouth daily Every early afternoon      aspirin (ASA) 81 MG EC tablet Take 1 tablet (81 mg) by mouth daily      cilostazol (PLETAL) 100 MG tablet Take 1 tablet (100 mg) by mouth 2 times daily 180 tablet 4    hydrochlorothiazide (HYDRODIURIL) 25 MG tablet Take 1 tablet (25 mg) by mouth daily 90 tablet 4    rosuvastatin (CRESTOR) 20 MG tablet Take 1 tablet (20 mg) by mouth at bedtime 90 tablet 4    vitamin D3 (D 5000) 125 MCG (5000 UT) tablet Take 1 tablet (125 mcg) by mouth every 72 hours       No Known Allergies      Past medical history, past surgical history, current medications and allergies reviewed and accurate to the best of my knowledge.          OBJECTIVE:     /76 (BP Location: Right arm, Patient Position: Sitting, Cuff Size: Adult Regular)   Pulse 92   Temp 97.6  F (36.4  C) (Temporal)   Resp 14   Ht 1.676 m (5' 6\")   Wt 83.9 kg (185 lb)   LMP 07/01/2010 (Approximate)   SpO2 96%   Breastfeeding No   BMI 29.86 kg/m    Body mass index is 29.86 kg/m .  Physical Exam    General Appearance: Well appearing female,  No acute distress  Eyes: no injection, no tearing or drainage, eye lids normal  Neck: supple with mild bilateral  adenopathy  Respiratory: normal respiration, no increased work of " breathing  Dermatological: vesicular and scabbed lesion on left cheek, left neck, left upper chest   Psychological: normal affect, alert, oriented, and pleasant.

## 2024-10-10 NOTE — PATIENT INSTRUCTIONS
Shingles rash to   Valtrex 1000 mg tablet, take 3 times daily for 7 days  Symptomatic treatments - ice, calamine lotion  Good hand washing, do not touch eyes  Ibuprofen or tylenol as directed for discomfort  Topical lidocaine to affected lesions 2-3 times daily. Smallest effective dose.   If you develop any eye lesions please see opthalmologist  Follow up with PCP if symptoms persist or worsen  Seek immediate care for worsening pain, redness, swelling, drainage, symptoms don't go away with treatment, rash near eye.

## 2024-11-23 ENCOUNTER — MYC MEDICAL ADVICE (OUTPATIENT)
Dept: FAMILY MEDICINE | Facility: OTHER | Age: 54
End: 2024-11-23
Payer: COMMERCIAL

## 2024-11-25 NOTE — TELEPHONE ENCOUNTER
Agree with your recommendations. She may use my provider add on for tomorrow, or she may follow up with her regular PCP if preferred.  DAVID KO CNP on 11/25/2024 at 10:42 AM

## 2024-11-25 NOTE — TELEPHONE ENCOUNTER
"Pt has \"whooshing\" in R ear. Thinks its r/t PAD (google).     LOV 4/9/24.    Recommend OV to rule out fluid in ear/ear infection and to monitor her b/p.     Routing to provider to review and respond.  Namita Glasgow RN on 11/25/2024 at 9:29 AM      "

## 2025-01-20 ENCOUNTER — MYC MEDICAL ADVICE (OUTPATIENT)
Dept: FAMILY MEDICINE | Facility: OTHER | Age: 55
End: 2025-01-20
Payer: COMMERCIAL

## 2025-03-27 ENCOUNTER — PATIENT OUTREACH (OUTPATIENT)
Dept: CARE COORDINATION | Facility: CLINIC | Age: 55
End: 2025-03-27

## 2025-04-16 ENCOUNTER — OFFICE VISIT (OUTPATIENT)
Dept: FAMILY MEDICINE | Facility: OTHER | Age: 55
End: 2025-04-16
Attending: NURSE PRACTITIONER
Payer: COMMERCIAL

## 2025-04-16 VITALS
TEMPERATURE: 97.8 F | WEIGHT: 183 LBS | BODY MASS INDEX: 29.41 KG/M2 | HEIGHT: 66 IN | SYSTOLIC BLOOD PRESSURE: 128 MMHG | OXYGEN SATURATION: 95 % | HEART RATE: 80 BPM | RESPIRATION RATE: 18 BRPM | DIASTOLIC BLOOD PRESSURE: 80 MMHG

## 2025-04-16 DIAGNOSIS — Z12.31 ENCOUNTER FOR SCREENING MAMMOGRAM FOR BREAST CANCER: ICD-10-CM

## 2025-04-16 DIAGNOSIS — Z87.891 PERSONAL HISTORY OF TOBACCO USE: ICD-10-CM

## 2025-04-16 DIAGNOSIS — Z00.00 ROUTINE GENERAL MEDICAL EXAMINATION AT A HEALTH CARE FACILITY: Primary | ICD-10-CM

## 2025-04-16 DIAGNOSIS — I70.213 INTERMITTENT CLAUDICATION OF BOTH LOWER EXTREMITIES DUE TO ATHEROSCLEROSIS: ICD-10-CM

## 2025-04-16 DIAGNOSIS — R68.89 ABNORMAL ANKLE BRACHIAL INDEX (ABI): ICD-10-CM

## 2025-04-16 DIAGNOSIS — F90.2 ADHD (ATTENTION DEFICIT HYPERACTIVITY DISORDER), COMBINED TYPE: ICD-10-CM

## 2025-04-16 DIAGNOSIS — G47.33 OSA ON CPAP: ICD-10-CM

## 2025-04-16 DIAGNOSIS — I25.10 ASCVD (ARTERIOSCLEROTIC CARDIOVASCULAR DISEASE): ICD-10-CM

## 2025-04-16 DIAGNOSIS — E78.5 DYSLIPIDEMIA: ICD-10-CM

## 2025-04-16 DIAGNOSIS — I10 HYPERTENSION, UNSPECIFIED TYPE: ICD-10-CM

## 2025-04-16 LAB
ALBUMIN SERPL BCG-MCNC: 4.2 G/DL (ref 3.5–5.2)
ALP SERPL-CCNC: 109 U/L (ref 40–150)
ALT SERPL W P-5'-P-CCNC: 25 U/L (ref 0–50)
ANION GAP SERPL CALCULATED.3IONS-SCNC: 10 MMOL/L (ref 7–15)
AST SERPL W P-5'-P-CCNC: 28 U/L (ref 0–45)
BILIRUB SERPL-MCNC: 0.3 MG/DL
BUN SERPL-MCNC: 12.5 MG/DL (ref 6–20)
CALCIUM SERPL-MCNC: 9.4 MG/DL (ref 8.8–10.4)
CHLORIDE SERPL-SCNC: 104 MMOL/L (ref 98–107)
CHOLEST SERPL-MCNC: 150 MG/DL
CREAT SERPL-MCNC: 0.78 MG/DL (ref 0.51–0.95)
EGFRCR SERPLBLD CKD-EPI 2021: 89 ML/MIN/1.73M2
FASTING STATUS PATIENT QL REPORTED: YES
FASTING STATUS PATIENT QL REPORTED: YES
GLUCOSE SERPL-MCNC: 98 MG/DL (ref 70–99)
HCO3 SERPL-SCNC: 26 MMOL/L (ref 22–29)
HDLC SERPL-MCNC: 44 MG/DL
HOLD SPECIMEN: NORMAL
LDLC SERPL CALC-MCNC: 75 MG/DL
NONHDLC SERPL-MCNC: 106 MG/DL
POTASSIUM SERPL-SCNC: 4 MMOL/L (ref 3.4–5.3)
PROT SERPL-MCNC: 7.5 G/DL (ref 6.4–8.3)
SODIUM SERPL-SCNC: 140 MMOL/L (ref 135–145)
TRIGL SERPL-MCNC: 154 MG/DL

## 2025-04-16 PROCEDURE — 82374 ASSAY BLOOD CARBON DIOXIDE: CPT | Mod: ZL | Performed by: NURSE PRACTITIONER

## 2025-04-16 PROCEDURE — 80061 LIPID PANEL: CPT | Mod: ZL | Performed by: NURSE PRACTITIONER

## 2025-04-16 PROCEDURE — 82310 ASSAY OF CALCIUM: CPT | Mod: ZL | Performed by: NURSE PRACTITIONER

## 2025-04-16 PROCEDURE — 36415 COLL VENOUS BLD VENIPUNCTURE: CPT | Mod: ZL | Performed by: NURSE PRACTITIONER

## 2025-04-16 RX ORDER — CILOSTAZOL 100 MG/1
100 TABLET ORAL 2 TIMES DAILY
Qty: 180 TABLET | Refills: 4 | Status: SHIPPED | OUTPATIENT
Start: 2025-04-16

## 2025-04-16 RX ORDER — ROSUVASTATIN CALCIUM 20 MG/1
20 TABLET, COATED ORAL AT BEDTIME
Qty: 90 TABLET | Refills: 4 | Status: SHIPPED | OUTPATIENT
Start: 2025-04-16

## 2025-04-16 RX ORDER — HYDROCHLOROTHIAZIDE 25 MG/1
25 TABLET ORAL DAILY
Qty: 90 TABLET | Refills: 4 | Status: SHIPPED | OUTPATIENT
Start: 2025-04-16

## 2025-04-16 SDOH — HEALTH STABILITY: PHYSICAL HEALTH: ON AVERAGE, HOW MANY MINUTES DO YOU ENGAGE IN EXERCISE AT THIS LEVEL?: 30 MIN

## 2025-04-16 SDOH — HEALTH STABILITY: PHYSICAL HEALTH: ON AVERAGE, HOW MANY DAYS PER WEEK DO YOU ENGAGE IN MODERATE TO STRENUOUS EXERCISE (LIKE A BRISK WALK)?: 2 DAYS

## 2025-04-16 ASSESSMENT — SOCIAL DETERMINANTS OF HEALTH (SDOH): HOW OFTEN DO YOU GET TOGETHER WITH FRIENDS OR RELATIVES?: THREE TIMES A WEEK

## 2025-04-16 ASSESSMENT — PAIN SCALES - GENERAL: PAINLEVEL_OUTOF10: NO PAIN (0)

## 2025-04-16 NOTE — PROGRESS NOTES
Preventive Care Visit  [facility]  Jaquelin Villalobos, APRN CNP, Nurse Practitioner - Family  Apr 16, 2025      Assessment & Plan   Problem List Items Addressed This Visit          Respiratory    MERCEDES on CPAP       Behavioral    ADHD (attention deficit hyperactivity disorder), combined type     Other Visit Diagnoses       Routine general medical examination at a health care facility    -  Primary    Hypertension, unspecified type        Relevant Medications    hydrochlorothiazide (HYDRODIURIL) 25 MG tablet    Other Relevant Orders    Comprehensive Metabolic Panel    Intermittent claudication of both lower extremities due to atherosclerosis        Relevant Medications    cilostazol (PLETAL) 100 MG tablet    rosuvastatin (CRESTOR) 20 MG tablet    ASCVD (arteriosclerotic cardiovascular disease)        Relevant Medications    cilostazol (PLETAL) 100 MG tablet    rosuvastatin (CRESTOR) 20 MG tablet    Abnormal ankle brachial index (GABBIE)        Relevant Medications    cilostazol (PLETAL) 100 MG tablet    Dyslipidemia        Relevant Medications    rosuvastatin (CRESTOR) 20 MG tablet    Other Relevant Orders    Lipid Panel    Personal history of tobacco use        Relevant Orders    Prof fee: Shared Decision Making for Lung Cancer Screening (Completed)    CT Chest Lung Cancer Scrn Low Dose wo    Encounter for screening mammogram for breast cancer        Relevant Orders    MA Screen Bilateral w/Jaswant           She does have obstructive sleep apnea, uses CPAP nightly and follows with sleep specialist.  Interested in Zepbound.  She will reach out to insurance to see if this is covered.  If this is covered, will send in prescription with close follow-up.  ADHD managed through mental provider  Blood pressure under good control, CMP updated and refilled hydrochlorothiazide  Intermittent claudication, cardiovascular disease and abnormal ankle-brachial index.  Refilled rosuvastatin and cilostazol, tolerating well,  "lipid panel updated  Lung cancer screening ordered, discussed smoking cessation, she has patches at home and is considering cessation  Mammogram ordered  Updated shingles and hep B vaccine today      Patient has been advised of split billing requirements and indicates understanding: Yes       Nicotine/Tobacco Cessation  She reports that she has been smoking cigarettes. She has a 30 pack-year smoking history. She has been exposed to tobacco smoke. She has never used smokeless tobacco.  Nicotine/Tobacco Cessation Plan  Self help information given to patient      BMI  Estimated body mass index is 29.54 kg/m  as calculated from the following:    Height as of this encounter: 1.676 m (5' 6\").    Weight as of this encounter: 83 kg (183 lb).   Weight management plan: Discussed healthy diet and exercise guidelines    Counseling  Appropriate preventive services were addressed with this patient via screening, questionnaire, or discussion as appropriate for fall prevention, nutrition, physical activity, Tobacco-use cessation, social engagement, weight loss and cognition.  Checklist reviewing preventive services available has been given to the patient.  Reviewed patient's diet, addressing concerns and/or questions.   She is at risk for lack of exercise and has been provided with information to increase physical activity for the benefit of her well-being.         Devonte Rose is a 54 year old, presenting for the following:  Physical           History of Present Illness       Reason for visit:  Yearly   She is taking medications regularly.  She presents clinic for annual wellness exam.  She does need refill medications and updated labs.    ADHD is managed with Munira Reyna through [facility] counseling prescribes this.    Uses CPAP nightly for sleep apnea.  Does follow with sleep specialist.  She is wondering about starting Zepbound as she heard this is a covered reason to be on the medication.  BMI is 29, " would not qualify for Zepbound based on weight.    Has patches at home, not started yet for smoking cessation.     Advance Care Planning            4/16/2025   General Health   How would you rate your overall physical health? (!) FAIR   Feel stress (tense, anxious, or unable to sleep) Only a little   (!) STRESS CONCERN      4/16/2025   Nutrition   Three or more servings of calcium each day? (!) NO   Diet: Regular (no restrictions)   How many servings of fruit and vegetables per day? (!) 0-1   How many sweetened beverages each day? 0-1         4/16/2025   Exercise   Days per week of moderate/strenous exercise 2 days   Average minutes spent exercising at this level 30 min   (!) EXERCISE CONCERN      4/16/2025   Social Factors   Frequency of gathering with friends or relatives Three times a week   Worry food won't last until get money to buy more No   Food not last or not have enough money for food? No   Do you have housing? (Housing is defined as stable permanent housing and does not include staying ouside in a car, in a tent, in an abandoned building, in an overnight shelter, or couch-surfing.) Yes   Are you worried about losing your housing? No   Lack of transportation? No   Unable to get utilities (heat,electricity)? No         4/16/2025   Fall Risk   Fallen 2 or more times in the past year? No   Trouble with walking or balance? No          4/16/2025   Dental   Dentist two times every year? Yes         Today's PHQ-2 Score:       4/16/2025     7:16 AM   PHQ-2 ( 1999 [employer])   Q1: Little interest or pleasure in doing things 0   Q2: Feeling down, depressed or hopeless 0   PHQ-2 Score 0    Q1: Little interest or pleasure in doing things Not at all   Q2: Feeling down, depressed or hopeless Not at all   PHQ-2 Score 0       Patient-reported           4/16/2025   Substance Use   Alcohol more than 3/day or more than 7/wk No   Do you use any other substances recreationally? No     Social History     Tobacco Use    Smoking  "status: Every Day     Current packs/day: 1.00     Average packs/day: 1 pack/day for 30.0 years (30.0 ttl pk-yrs)     Types: Cigarettes     Passive exposure: Current    Smokeless tobacco: Never   Vaping Use    Vaping status: Never Used   Substance Use Topics    Alcohol use: Yes     Alcohol/week: 0.0 standard drinks of alcohol     Comment: occ    Drug use: No           5/9/2024   LAST FHS-7 RESULTS   1st degree relative breast or ovarian cancer No   Any relative bilateral breast cancer No   Any male have breast cancer No   Any ONE woman have BOTH breast AND ovarian cancer No   Any woman with breast cancer before 50yrs No   2 or more relatives with breast AND/OR ovarian cancer No   2 or more relatives with breast AND/OR bowel cancer No        Mammogram Screening - Mammogram every 1-2 years updated in Health Maintenance based on mutual decision making        4/16/2025   STI Screening   New sexual partner(s) since last STI/HIV test? No     History of abnormal Pap smear: Status post hysterectomy with removal of cervix and no history of CIN2 or greater or cervical cancer. Health Maintenance and Surgical History updated.       ASCVD Risk   The 10-year ASCVD risk score (Alyssia BO, et al., 2019) is: 7.1%    Values used to calculate the score:      Age: 54 years      Sex: Female      Is Non- : No      Diabetic: No      Tobacco smoker: Yes      Systolic Blood Pressure: 128 mmHg      Is BP treated: Yes      HDL Cholesterol: 39 mg/dL      Total Cholesterol: 160 mg/dL           Reviewed and updated as needed this visit by Provider   Tobacco  Allergies  Meds  Problems  Med Hx  Surg Hx  Fam Hx                 Objective    Exam  /80   Pulse 80   Temp 97.8  F (36.6  C)   Resp 18   Ht 1.676 m (5' 6\")   Wt 83 kg (183 lb)   LMP 07/01/2010 (Approximate)   SpO2 95%   BMI 29.54 kg/m     Estimated body mass index is 29.54 kg/m  as calculated from the following:    Height as of this " "encounter: 1.676 m (5' 6\").    Weight as of this encounter: 83 kg (183 lb).    Physical Exam  GENERAL: alert and no distress  EYES: Eyes grossly normal to inspection, PERRL and conjunctivae and sclerae normal  HENT: ear canals and TM's normal, nose and mouth without ulcers or lesions  NECK: no adenopathy, no asymmetry, masses, or scars  RESP: lungs clear to auscultation - no rales, rhonchi or wheezes  CV: regular rate and rhythm, normal S1 S2, no S3 or S4, no murmur, click or rub, no peripheral edema  ABDOMEN: soft, nontender, no hepatosplenomegaly, no masses and bowel sounds normal  MS: no gross musculoskeletal defects noted, no edema  SKIN: no suspicious lesions or rashes  NEURO: Normal strength and tone, mentation intact and speech normal  PSYCH: mentation appears normal, affect normal/bright        Signed Electronically by: DAVID Diaz CNP  Lung Cancer Screening Shared Decision Making Visit     Rose Mendez, a 54 year old female, is eligible for lung cancer screening    History   Smoking Status    Every Day    Types: Cigarettes   Smokeless Tobacco    Never       I have discussed with patient the risks and benefits of screening for lung cancer with low-dose CT.     The risks include:    radiation exposure: one low dose chest CT has as much ionizing radiation as about 15 chest x-rays, or 6 months of background radiation living in [place]      false positives: most findings/nodules are NOT cancer, but some might still require additional diagnostic evaluation, including biopsy    over-diagnosis: some slow growing cancers that might never have been clinically significant will be detected and treated unnecessarily     The benefit of early detection of lung cancer is contingent upon adherence to annual screening or more frequent follow up if indicated.     Furthermore, to benefit from screening, Rose must be willing and able to undergo diagnostic procedures, if indicated. Although no specific guide is " available for determining severity of comorbidities, it is reasonable to withhold screening in patients who have greater mortality risk from other diseases.     We did discuss that the best way to prevent lung cancer is to not smoke.    Some patients may value a numeric estimation of lung cancer risk when evaluating if lung cancer screening is right for them, here is one calculator:    ShouldIScreen

## 2025-04-16 NOTE — PATIENT INSTRUCTIONS
Patient Education   Preventive Care Advice   This is general advice given by our system to help you stay healthy. However, your care team may have specific advice just for you. Please talk to your care team about your preventive care needs.  Nutrition  Eat 5 or more servings of fruits and vegetables each day.  Try wheat bread, brown rice and whole grain pasta (instead of white bread, rice, and pasta).  Get enough calcium and vitamin D. Check the label on foods and aim for 100% of the RDA (recommended daily allowance).  Lifestyle  Exercise at least 150 minutes each week  (30 minutes a day, 5 days a week).  Do muscle strengthening activities 2 days a week. These help control your weight and prevent disease.  No smoking.  Wear sunscreen to prevent skin cancer.  Have a dental exam and cleaning every 6 months.  Yearly exams  See your health care team every year to talk about:  Any changes in your health.  Any medicines your care team has prescribed.  Preventive care, family planning, and ways to prevent chronic diseases.  Shots (vaccines)   HPV shots (up to age 26), if you've never had them before.  Hepatitis B shots (up to age 59), if you've never had them before.  COVID-19 shot: Get this shot when it's due.  Flu shot: Get a flu shot every year.  Tetanus shot: Get a tetanus shot every 10 years.  Pneumococcal, hepatitis A, and RSV shots: Ask your care team if you need these based on your risk.  Shingles shot (for age 50 and up)  General health tests  Diabetes screening:  Starting at age 35, Get screened for diabetes at least every 3 years.  If you are younger than age 35, ask your care team if you should be screened for diabetes.  Cholesterol test: At age 39, start having a cholesterol test every 5 years, or more often if advised.  Bone density scan (DEXA): At age 50, ask your care team if you should have this scan for osteoporosis (brittle bones).  Hepatitis C: Get tested at least once in your life.  STIs (sexually  transmitted infections)  Before age 24: Ask your care team if you should be screened for STIs.  After age 24: Get screened for STIs if you're at risk. You are at risk for STIs (including HIV) if:  You are sexually active with more than one person.  You don't use condoms every time.  You or a partner was diagnosed with a sexually transmitted infection.  If you are at risk for HIV, ask about PrEP medicine to prevent HIV.  Get tested for HIV at least once in your life, whether you are at risk for HIV or not.  Cancer screening tests  Cervical cancer screening: If you have a cervix, begin getting regular cervical cancer screening tests starting at age 21.  Breast cancer scan (mammogram): If you've ever had breasts, begin having regular mammograms starting at age 40. This is a scan to check for breast cancer.  Colon cancer screening: It is important to start screening for colon cancer at age 45.  Have a colonoscopy test every 10 years (or more often if you're at risk) Or, ask your provider about stool tests like a FIT test every year or Cologuard test every 3 years.  To learn more about your testing options, visit:   .  For help making a decision, visit:   https://bit.ly/uq75672.  Prostate cancer screening test: If you have a prostate, ask your care team if a prostate cancer screening test (PSA) at age 55 is right for you.  Lung cancer screening: If you are a current or former smoker ages 50 to 80, ask your care team if ongoing lung cancer screenings are right for you.  For informational purposes only. Not to replace the advice of your health care provider. Copyright   2023 [facility]. All rights reserved. Clinically reviewed by the [facility]. Kimengi [id number] - REV 01/24.     Lung Cancer Screening   Frequently Asked Questions  If you are at high-risk for lung cancer, getting screened with low-dose computed tomography (LDCT) every year can help save your life. This handout offers  answers to some of the most common questions about lung cancer screening. If you have other questions, please call [phone] ([phone]).     What is it?  Lung cancer screening uses special X-ray technology to create an image of your lung tissue. The exam is quick and easy and takes less than 10 seconds. We don t give you any medicine or use any needles. You can eat before and after the exam. You don t need to change your clothes as long as the clothing on your chest doesn t contain metal. But, you do need to be able to hold your breath for at least 6 seconds during the exam.    What is the goal of lung cancer screening?  The goal of lung cancer screening is to save lives. Many times, lung cancer is not found until a person starts having physical symptoms. Lung cancer screening can help detect lung cancer in the earliest stages when it may be easier to treat.    Who should be screened for lung cancer?  We suggest lung cancer screening for anyone who is at high-risk for lung cancer. You are in the high-risk group if you:      are between the ages of 55 and 79, and    have smoked at least 1 pack of cigarettes a day for 20 or more years, and    still smoke or have quit within the past 15 years.    However, if you have a new cough or shortness of breath, you should talk to your doctor before being screened.    Why does it matter if I have symptoms?  Certain symptoms can be a sign that you have a condition in your lungs that should be checked and treated by your doctor. These symptoms include fever, chest pain, a new or changing cough, shortness of breath that you have never felt before, coughing up blood or unexplained weight loss. Having any of these symptoms can greatly affect the results of lung cancer screening.       Should all smokers get an LDCT lung cancer screening exam?  It depends. Lung cancer screening is for a very specific group of men and women who have a history of heavy smoking over a long  period of time (see  Who should be screened for lung cancer  above).  I am in the high-risk group, but have been diagnosed with cancer in the past. Is LDCT lung cancer screening right for me?  In some cases, you should not have LDCT lung screening, such as when your doctor is already following your cancer with CT scan studies. Your doctor will help you decide if LDCT lung screening is right for you.  Do I need to have a screening exam every year?  Yes. If you are in the high-risk group described earlier, you should get an LDCT lung cancer screening exam every year until you are 79, or are no longer willing or able to undergo screening and possible procedures to diagnose and treat lung cancer.  How effective is LDCT at preventing death from lung cancer?  Studies have shown that LDCT lung cancer screening can lower the risk of death from lung cancer by 20 percent in people who are at high-risk.  What are the risks?  There are some risks and limitations of LDCT lung cancer screening. We want to make sure you understand the risks and benefits, so please let us know if you have any questions. Your doctor may want to talk with you more about these risks.    Radiation exposure: As with any exam that uses radiation, there is a very small increased risk of cancer. The amount of radiation in LDCT is small--about the same amount a person would get from a mammogram. Your doctor orders the exam when he or she feels the potential benefits outweigh the risks.    False negatives: No test is perfect, including LDCT. It is possible that you may have a medical condition, including lung cancer, that is not found during your exam. This is called a false negative result.    False positives and more testing: LDCT very often finds something in the lung that could be cancer, but in fact is not. This is called a false positive result. False positive tests often cause anxiety. To make sure these findings are not cancer, you may need to have  more tests. These tests will be done only if you give us permission. Sometimes patients need a treatment that can have side effects, such as a biopsy. For more information on false positives, see  What can I expect from the results?     Findings not related to lung cancer: Your LDCT exam also takes pictures of areas of your body next to your lungs. In a very small number of cases, the CT scan will show an abnormal finding in one of these areas, such as your kidneys, adrenal glands, liver or thyroid. This finding may not be serious, but you may need more tests. Your doctor can help you decide what other tests you may need, if any.  What can I expect from the results?  About 1 out of 4 LDCT exams will find something that may need more tests. Most of the time, these findings are lung nodules. Lung nodules are very small collections of tissue in the lung. These nodules are very common, and the vast majority--more than 97 percent--are not cancer (benign). Most are normal lymph nodes or small areas of scarring from past infections.  But, if a small lung nodule is found to be cancer, the cancer can be cured more than 90 percent of the time. To know if the nodule is cancer, we may need to get more images before your next yearly screening exam. If the nodule has suspicious features (for example, it is large, has an odd shape or grows over time), we will refer you to a specialist for further testing.  Will my doctor also get the results?  Yes. Your doctor will get a copy of your results.  Is it okay to keep smoking now that there s a cancer screening exam?  No. Tobacco is one of the strongest cancer-causing agents. It causes not only lung cancer, but other cancers and cardiovascular (heart) diseases as well. The damage caused by smoking builds over time. This means that the longer you smoke, the higher your risk of disease. While it is never too late to quit, the sooner you quit, the better.  Where can I find help to quit  smoking?  The best way to prevent lung cancer is to stop smoking. If you have already quit smoking, congratulations and keep it up! For help on quitting smoking, please call [facility] at 4-772-QUITNOW (1-932.903.3930) or the American [employer] at 1-265.907.2473 to find local resources near you.  One-on-one health coaching:  If you d prefer to work individually with a health care provider on tobacco cessation, we offer:      Medication Therapy Management:  Our specially trained pharmacists work closely with you and your doctor to help you quit smoking.  Call [phone] or [phone] (toll free).

## 2025-04-16 NOTE — NURSING NOTE
Patient presents today for annual physical.    Medication Reconciliation Complete    Filomena Nava LPN  4/16/2025 7:43 AM

## 2025-04-29 DIAGNOSIS — G47.33 OSA ON CPAP: Primary | ICD-10-CM

## 2025-04-29 NOTE — TELEPHONE ENCOUNTER
Received call from Juan with [employer] asking us to complete the PA for Zepbound.  We need to have a active rx to complete a PA.  Are you prescribing this rx?

## 2025-04-29 NOTE — TELEPHONE ENCOUNTER
I can send in the prescription for Zepbound.  What pharmacy is she wanting to use? DAVID Diaz CNP on 4/29/2025 at 9:32 AM

## 2025-04-29 NOTE — TELEPHONE ENCOUNTER
Per last OV note, patient was going to reach out to insurance. Needs PA after prescribing.    Adeola Hope on 4/29/2025 at 9:11 AM

## 2025-07-23 ENCOUNTER — OFFICE VISIT (OUTPATIENT)
Dept: FAMILY MEDICINE | Facility: OTHER | Age: 55
End: 2025-07-23
Attending: FAMILY MEDICINE
Payer: COMMERCIAL

## 2025-07-23 VITALS
WEIGHT: 170 LBS | DIASTOLIC BLOOD PRESSURE: 80 MMHG | HEART RATE: 79 BPM | HEIGHT: 66 IN | SYSTOLIC BLOOD PRESSURE: 120 MMHG | TEMPERATURE: 97.1 F | OXYGEN SATURATION: 99 % | BODY MASS INDEX: 27.32 KG/M2 | RESPIRATION RATE: 16 BRPM

## 2025-07-23 DIAGNOSIS — R19.7 DIARRHEA OF PRESUMED INFECTIOUS ORIGIN: Primary | ICD-10-CM

## 2025-07-23 LAB
ALBUMIN SERPL BCG-MCNC: 4.2 G/DL (ref 3.5–5.2)
ALP SERPL-CCNC: 87 U/L (ref 40–150)
ALT SERPL W P-5'-P-CCNC: 15 U/L (ref 0–50)
ANION GAP SERPL CALCULATED.3IONS-SCNC: 12 MMOL/L (ref 7–15)
AST SERPL W P-5'-P-CCNC: 20 U/L (ref 0–45)
BASOPHILS # BLD AUTO: 0 10E3/UL (ref 0–0.2)
BASOPHILS NFR BLD AUTO: 1 %
BILIRUB SERPL-MCNC: 0.3 MG/DL
BUN SERPL-MCNC: 10.4 MG/DL (ref 6–20)
CALCIUM SERPL-MCNC: 9.4 MG/DL (ref 8.8–10.4)
CHLORIDE SERPL-SCNC: 103 MMOL/L (ref 98–107)
CREAT SERPL-MCNC: 0.83 MG/DL (ref 0.51–0.95)
EGFRCR SERPLBLD CKD-EPI 2021: 82 ML/MIN/1.73M2
EOSINOPHIL # BLD AUTO: 0.1 10E3/UL (ref 0–0.7)
EOSINOPHIL NFR BLD AUTO: 2 %
ERYTHROCYTE [DISTWIDTH] IN BLOOD BY AUTOMATED COUNT: 13.8 % (ref 10–15)
GLUCOSE SERPL-MCNC: 97 MG/DL (ref 70–99)
HCO3 SERPL-SCNC: 26 MMOL/L (ref 22–29)
HCT VFR BLD AUTO: 43.6 % (ref 35–47)
HGB BLD-MCNC: 14.4 G/DL (ref 11.7–15.7)
IMM GRANULOCYTES # BLD: 0 10E3/UL
IMM GRANULOCYTES NFR BLD: 0 %
LYMPHOCYTES # BLD AUTO: 2.3 10E3/UL (ref 0.8–5.3)
LYMPHOCYTES NFR BLD AUTO: 28 %
MCH RBC QN AUTO: 26.6 PG (ref 26.5–33)
MCHC RBC AUTO-ENTMCNC: 33 G/DL (ref 31.5–36.5)
MCV RBC AUTO: 81 FL (ref 78–100)
MONOCYTES # BLD AUTO: 0.5 10E3/UL (ref 0–1.3)
MONOCYTES NFR BLD AUTO: 6 %
NEUTROPHILS # BLD AUTO: 5.4 10E3/UL (ref 1.6–8.3)
NEUTROPHILS NFR BLD AUTO: 64 %
NRBC # BLD AUTO: 0 10E3/UL
NRBC BLD AUTO-RTO: 0 /100
PLATELET # BLD AUTO: 231 10E3/UL (ref 150–450)
POTASSIUM SERPL-SCNC: 3.1 MMOL/L (ref 3.4–5.3)
PROT SERPL-MCNC: 7.1 G/DL (ref 6.4–8.3)
RBC # BLD AUTO: 5.41 10E6/UL (ref 3.8–5.2)
SODIUM SERPL-SCNC: 141 MMOL/L (ref 135–145)
WBC # BLD AUTO: 8.4 10E3/UL (ref 4–11)

## 2025-07-23 PROCEDURE — 36415 COLL VENOUS BLD VENIPUNCTURE: CPT | Mod: ZL | Performed by: FAMILY MEDICINE

## 2025-07-23 PROCEDURE — 84155 ASSAY OF PROTEIN SERUM: CPT | Mod: ZL | Performed by: FAMILY MEDICINE

## 2025-07-23 PROCEDURE — 85004 AUTOMATED DIFF WBC COUNT: CPT | Mod: ZL | Performed by: FAMILY MEDICINE

## 2025-07-23 ASSESSMENT — PAIN SCALES - GENERAL: PAINLEVEL_OUTOF10: MODERATE PAIN (4)

## 2025-07-23 NOTE — NURSING NOTE
"Chief Complaint   Patient presents with    Gastrointestinal Problem     Diarrhea, abdominal pain, cramping, low back pain -  4 days       Initial /80 (BP Location: Right arm, Patient Position: Sitting, Cuff Size: Adult Regular)   Pulse 79   Temp 97.1  F (36.2  C) (Temporal)   Resp 16   Ht 1.676 m (5' 6\")   Wt 77.1 kg (170 lb)   LMP 07/01/2010 (Approximate)   SpO2 99%   Breastfeeding No   BMI 27.44 kg/m   Estimated body mass index is 27.44 kg/m  as calculated from the following:    Height as of this encounter: 1.676 m (5' 6\").    Weight as of this encounter: 77.1 kg (170 lb).    Medication Review: complete    The next two questions are to help us understand your food security.  If you are feeling you need any assistance in this area, we have resources available to support you today.          4/16/2025   SDOH- Food Insecurity   Within the past 12 months, did you worry that your food would run out before you got money to buy more? N   Within the past 12 months, did the food you bought just not last and you didn t have money to get more? N     Health Care Directive:  Patient does not have a Health Care Directive: Discussed advance care planning with patient; however, patient declined at this time.    Liz Rodríguez LPN      "

## 2025-07-23 NOTE — PROGRESS NOTES
"    Assessment & Plan       ICD-10-CM    1. Diarrhea of presumed infectious origin  R19.7 Comprehensive Metabolic Panel     CBC and Differential     Focused Enteric Pathogen Panel by PCR     Comprehensive Metabolic Panel     CBC and Differential     Focused Enteric Pathogen Panel by PCR           I have personally reviewed the labs listed below. Presume that diarrhea is infectious and not inflammatory, medication side effects, malabsorption, other structural ; diverticulitis considered  Discussed over the counter hydration solutions and consideration for taking tums to slow diarrhea.  Stool studies pending - depending upon results, consider treatment given wedding/events this weekend.      PDMP Review         Value Time User    State PDMP site checked  Yes 7/24/2025  7:55 AM Barbara Madrigal MD                   The longitudinal plan of care was addressed during this visit. Due to the added complexity in care, I will continue to support Rose Mendez in the subsequent management of this condition(s) and with the ongoing continuity of care of this condition(s).          BARBARA BLANKENSHIP MD  [facility]    Subjective   Rose Mendez is a 55 year old female  presenting for the following health issues: Nursing Notes:   Liz Rodríguez LPN  7/23/2025  2:54 PM  Signed  Chief Complaint   Patient presents with    Gastrointestinal Problem     Diarrhea, abdominal pain, cramping, low back pain -  4 days       Initial /80 (BP Location: Right arm, Patient Position: Sitting, Cuff Size: Adult Regular)   Pulse 79   Temp 97.1  F (36.2  C) (Temporal)   Resp 16   Ht 1.676 m (5' 6\")   Wt 77.1 kg (170 lb)   LMP 07/01/2010 (Approximate)   SpO2 99%   Breastfeeding No   BMI 27.44 kg/m   Estimated body mass index is 27.44 kg/m  as calculated from the following:    Height as of this encounter: 1.676 m (5' 6\").    Weight as of this encounter: 77.1 kg (170 lb).    Medication Review: " complete    The next two questions are to help us understand your food security.  If you are feeling you need any assistance in this area, we have resources available to support you today.          4/16/2025   SDOH- Food Insecurity   Within the past 12 months, did you worry that your food would run out before you got money to buy more? N   Within the past 12 months, did the food you bought just not last and you didn t have money to get more? N     Health Care Directive:  Patient does not have a Health Care Directive: Discussed advance care planning with patient; however, patient declined at this time.    Liz Rodríguez, СЕРГЕЙN                                HPI Rose AUSTIN Mendez is a 55 year old female presents for evaluation of abdomen cramping and diarrhea.  Woke up with this in the middle of the night on the 19th into the 20th.  Now if she eats, the cramping and diarrhea come back.  Has continued to wake her up some at night but not as severe as the first night.  Cramping better after passing stool.    No fever.    No bleeding/blood.  No nausea and vomiting.    Today she has had 4 episodes of diarrhea.  It is watery with flakes.    No new foods  No travel  No antibiotics    No one else at home is sick.    Has lost about 20# in ten weeks - intentional.   Had been on tirzepatide       Wedding this upcoming weekend (patient's daughter)  - this is her main concern with her symptoms     Colonoscopy done 2018  States has history of kidney stone     Answers submitted by the patient for this visit:  Back Pain Visit Questionnaire (Submitted on 7/23/2025)  Your back pain is: new  New Back Pain Visit Questionnaire  (Submitted on 7/23/2025)  What do you think is the original cause of your back pain?: other  When did you first notice your back pain? : in the last week  How would you describe your back pain? : dull ache  How often do you feel your back pain? : daily  Where is your back pain located? : right lower back, right middle  of back  Where does your back pain spread? : nowhere  Since you noticed your back pain, how has it changed? : unchanged  Does your back pain interfere with your job?: No  On a scale of 1-10 (10 being the worst), how strong is your back pain?: 4  What makes your back pain worse? : nothing  Acupuncture:: not tried  Acetaminophen: not helpful  Activity or Exercise: not helpful  Chiropractor: not tried  Cold: not tried  Heat: not helpful  Massage: not helpful  Muscle relaxants : not tried  NSAIDS (Ibuprofen, Naproxen) : not helpful  Opioids: not tried  Physical Therapy: not tried  Rest: not helpful  Steroid Injection: not tried  Stretching : not helpful  Surgery: not tried  TENS Unit: not tried  Topical pain relievers : not helpful  Do you see any other healthcare providers for your back pain? : None  General Questionnaire (Submitted on 7/23/2025)  Chief Complaint: Chronic problems general questions HPI Form  How many days per week do you miss taking your medication?: 0  General Concern (Submitted on 7/23/2025)  Chief Complaint: Chronic problems general questions HPI Form  What is the reason for your visit today?: Diherrea and pain  What are your symptoms?: Cramping, lower back pain liquid diahrrea  How would you describe these symptoms?: Severe  Are your symptoms:: Staying the same  Have you had these symptoms before?: No  Is there anything that makes you feel worse?: No  Is there anything that makes you feel better?: No  Questionnaire about: Chronic problems general questions HPI Form (Submitted on 7/23/2025)  Chief Complaint: Chronic problems general questions HPI Form      Current Outpatient Medications   Medication Sig Dispense Refill    amphetamine-dextroamphetamine (ADDERALL XR) 20 MG 24 hr capsule Take 1 capsule by mouth every morning      amphetamine-dextroamphetamine (ADDERALL) 5 MG tablet Take 1 tablet by mouth daily Every early afternoon      aspirin (ASA) 81 MG EC tablet Take 1 tablet (81 mg) by mouth daily       cilostazol (PLETAL) 100 MG tablet Take 1 tablet (100 mg) by mouth 2 times daily. 180 tablet 4    hydrochlorothiazide (HYDRODIURIL) 25 MG tablet Take 1 tablet (25 mg) by mouth daily. 90 tablet 4    rosuvastatin (CRESTOR) 20 MG tablet Take 1 tablet (20 mg) by mouth at bedtime. 90 tablet 4    vitamin D3 (D 5000) 125 MCG (5000 UT) tablet Take 1 tablet (125 mcg) by mouth every 72 hours       No current facility-administered medications for this visit.     Past Medical History:   Diagnosis Date    ADHD (attention deficit hyperactivity disorder), combined type 11/25/2018    Diagnostic assessment completed by Lena Ram [facility] at [facility] 10/2018--see scans    Adjustment disorder with mixed anxiety and depressed mood 02/16/2018    Overview:  with mood disorder    Adverse effect of anesthetic     08/01/2005,Notes prior complications from anesthesia:  (08/01/2005) Nausea    Dependence on other enabling machines and devices     7/10/2015,Sleep studies and followup per Dr Alcantara 7/2015    Disturbance of skin sensation     left side, resolved. Normal MRI    Ganglion of wrist     9/3/2014    Injury of ulnar nerve of forearm     (left Dr Riggs    Nontoxic multinodular goiter     1/19/2015    MERCEDES (obstructive sleep apnea)     Osteoarthritis of first carpometacarpal joint     8/27/2014    Other fracture of left lower leg, initial encounter for closed fracture     No Comments Provided    Personal history of other medical treatment (CODE)     20 c- sections    TMJ (temporomandibular joint syndrome)     Vitamin D deficiency     2/13/2015               Review of Systems           3/21/2023     3:14 PM 4/8/2024    11:27 AM 12/6/2024     8:51 AM   PHQ   PHQ-9 Total Score 1 2 3    Q9: Thoughts of better off dead/self-harm past 2 weeks Not at all  Not at all Not at all       Patient-reported    Proxy-reported         3/21/2023     3:15 PM 4/8/2024    11:28 AM 12/6/2024     8:51 AM   YOLANDA-7 SCORE  "  Total Score 0 (minimal anxiety) 0 (minimal anxiety) 3 (minimal anxiety)   Total Score 0 0 3        Patient-reported             Objective  /80 (BP Location: Right arm, Patient Position: Sitting, Cuff Size: Adult Regular)   Pulse 79   Temp 97.1  F (36.2  C) (Temporal)   Resp 16   Ht 1.676 m (5' 6\")   Wt 77.1 kg (170 lb)   LMP 07/01/2010 (Approximate)   SpO2 99%   Breastfeeding No   BMI 27.44 kg/m     Physical Exam   GENERAL: alert and no distress  CV: RRR  ABDOMEN: soft, mild diffused tenderness but worse LLQ; non-distended; no CVA tenderness    Results for orders placed or performed in visit on 07/23/25   Comprehensive Metabolic Panel     Status: Abnormal   Result Value Ref Range    Sodium 141 135 - 145 mmol/L    Potassium 3.1 (L) 3.4 - 5.3 mmol/L    Carbon Dioxide (CO2) 26 22 - 29 mmol/L    Anion Gap 12 7 - 15 mmol/L    Urea Nitrogen 10.4 6.0 - 20.0 mg/dL    Creatinine 0.83 0.51 - 0.95 mg/dL    GFR Estimate 82 >60 mL/min/1.73m2    Calcium 9.4 8.8 - 10.4 mg/dL    Chloride 103 98 - 107 mmol/L    Glucose 97 70 - 99 mg/dL    Alkaline Phosphatase 87 40 - 150 U/L    AST 20 0 - 45 U/L    ALT 15 0 - 50 U/L    Protein Total 7.1 6.4 - 8.3 g/dL    Albumin 4.2 3.5 - 5.2 g/dL    Bilirubin Total 0.3 <=1.2 mg/dL   CBC with platelets and differential     Status: Abnormal   Result Value Ref Range    WBC Count 8.4 4.0 - 11.0 10e3/uL    RBC Count 5.41 (H) 3.80 - 5.20 10e6/uL    Hemoglobin 14.4 11.7 - 15.7 g/dL    Hematocrit 43.6 35.0 - 47.0 %    MCV 81 78 - 100 fL    MCH 26.6 26.5 - 33.0 pg    MCHC 33.0 31.5 - 36.5 g/dL    RDW 13.8 10.0 - 15.0 %    Platelet Count 231 150 - 450 10e3/uL    % Neutrophils 64 %    % Lymphocytes 28 %    % Monocytes 6 %    % Eosinophils 2 %    % Basophils 1 %    % Immature Granulocytes 0 %    NRBCs per 100 WBC 0 <1 /100    Absolute Neutrophils 5.4 1.6 - 8.3 10e3/uL    Absolute Lymphocytes 2.3 0.8 - 5.3 10e3/uL    Absolute Monocytes 0.5 0.0 - 1.3 10e3/uL    Absolute Eosinophils 0.1 0.0 - 0.7 " 10e3/uL    Absolute Basophils 0.0 0.0 - 0.2 10e3/uL    Absolute Immature Granulocytes 0.0 <=0.4 10e3/uL    Absolute NRBCs 0.0 10e3/uL   CBC and Differential     Status: Abnormal    Narrative    The following orders were created for panel order CBC and Differential.  Procedure                               Abnormality         Status                     ---------                               -----------         ------                     CBC with platelets and ...[[id number]]  Abnormal            Final result                 Please view results for these tests on the individual orders.

## 2025-07-24 ENCOUNTER — APPOINTMENT (OUTPATIENT)
Dept: LAB | Facility: OTHER | Age: 55
End: 2025-07-24
Attending: FAMILY MEDICINE
Payer: COMMERCIAL

## 2025-07-24 LAB
C CAYETANENSIS DNA STL QL NAA+NON-PROBE: NEGATIVE
CAMPYLOBACTER DNA SPEC NAA+PROBE: NEGATIVE
CRYPTOSP DNA STL QL NAA+NON-PROBE: NEGATIVE
EC STX1+STX2 GENES STL QL NAA+NON-PROBE: NEGATIVE
G LAMBLIA DNA STL QL NAA+NON-PROBE: NEGATIVE
NOROVIRUS GI+II RNA STL QL NAA+NON-PROBE: NEGATIVE
SALMONELLA SP RPOD STL QL NAA+PROBE: NEGATIVE
SHIGELLA SP+EIEC IPAH ST NAA+NON-PROBE: NEGATIVE
VIBRIO DNA SPEC NAA+PROBE: NEGATIVE
Y ENTEROCOL DNA STL QL NAA+PROBE: NEGATIVE

## 2025-07-24 PROCEDURE — 87506 IADNA-DNA/RNA PROBE TQ 6-11: CPT | Mod: ZL | Performed by: FAMILY MEDICINE

## 2025-08-10 ENCOUNTER — HOSPITAL ENCOUNTER (EMERGENCY)
Facility: OTHER | Age: 55
Discharge: HOME | End: 2025-08-10
Attending: EMERGENCY MEDICINE
Payer: COMMERCIAL

## 2025-08-10 VITALS
WEIGHT: 170 LBS | HEART RATE: 92 BPM | TEMPERATURE: 98.7 F | DIASTOLIC BLOOD PRESSURE: 72 MMHG | SYSTOLIC BLOOD PRESSURE: 105 MMHG | BODY MASS INDEX: 27.32 KG/M2 | RESPIRATION RATE: 18 BRPM | HEIGHT: 66 IN | OXYGEN SATURATION: 97 %

## 2025-08-10 DIAGNOSIS — S61.419A LACERATION OF DORSUM OF HAND: Primary | ICD-10-CM

## 2025-08-10 PROCEDURE — 250N000009 HC RX 250: Performed by: EMERGENCY MEDICINE

## 2025-08-10 PROCEDURE — 99207 PR NO CHARGE LOS: CPT | Performed by: EMERGENCY MEDICINE

## 2025-08-10 PROCEDURE — 12002 RPR S/N/AX/GEN/TRNK2.6-7.5CM: CPT | Performed by: EMERGENCY MEDICINE

## 2025-08-10 PROCEDURE — 99282 EMERGENCY DEPT VISIT SF MDM: CPT | Performed by: EMERGENCY MEDICINE

## 2025-08-10 RX ADMIN — LIDOCAINE HYDROCHLORIDE 5 ML: 10 INJECTION, SOLUTION EPIDURAL; INFILTRATION; INTRACAUDAL; PERINEURAL at 18:04

## 2025-08-10 ASSESSMENT — COLUMBIA-SUICIDE SEVERITY RATING SCALE - C-SSRS
6. HAVE YOU EVER DONE ANYTHING, STARTED TO DO ANYTHING, OR PREPARED TO DO ANYTHING TO END YOUR LIFE?: NO
1. IN THE PAST MONTH, HAVE YOU WISHED YOU WERE DEAD OR WISHED YOU COULD GO TO SLEEP AND NOT WAKE UP?: NO
2. HAVE YOU ACTUALLY HAD ANY THOUGHTS OF KILLING YOURSELF IN THE PAST MONTH?: NO

## 2025-08-10 ASSESSMENT — ENCOUNTER SYMPTOMS
NAUSEA: 0
SHORTNESS OF BREATH: 0
WOUND: 1
CHEST TIGHTNESS: 0
VOMITING: 0
FEVER: 0
CHILLS: 0
ARTHRALGIAS: 0
LIGHT-HEADEDNESS: 0
AGITATION: 0
DYSURIA: 0

## 2025-08-10 ASSESSMENT — ACTIVITIES OF DAILY LIVING (ADL): ADLS_ACUITY_SCORE: 41

## 2025-08-11 ENCOUNTER — PATIENT OUTREACH (OUTPATIENT)
Dept: CARE COORDINATION | Facility: CLINIC | Age: 55
End: 2025-08-11
Payer: COMMERCIAL

## (undated) DEVICE — SUCTION MANIFOLD NEPTUNE 2 SYS 4 PORT 0702-020-000

## (undated) DEVICE — ENDO BRUSH CHANNEL MASTER CLEANING 2-4.2MM BW-412T

## (undated) DEVICE — TUBING SUCTION 10'X3/16" N510

## (undated) DEVICE — ENDO KIT COMPLIANCE DYKENDOCMPLY

## (undated) DEVICE — ENDO FORCEP ENDOJAW BIOPSY 2.8MMX230CM FB-220U

## (undated) RX ORDER — LIDOCAINE HYDROCHLORIDE 10 MG/ML
INJECTION, SOLUTION EPIDURAL; INFILTRATION; INTRACAUDAL; PERINEURAL
Status: DISPENSED
Start: 2025-08-10

## (undated) RX ORDER — LIDOCAINE HYDROCHLORIDE 20 MG/ML
INJECTION, SOLUTION EPIDURAL; INFILTRATION; INTRACAUDAL; PERINEURAL
Status: DISPENSED
Start: 2018-03-01

## (undated) RX ORDER — PROPOFOL 10 MG/ML
INJECTION, EMULSION INTRAVENOUS
Status: DISPENSED
Start: 2018-03-01